# Patient Record
Sex: FEMALE | Race: BLACK OR AFRICAN AMERICAN | NOT HISPANIC OR LATINO | Employment: OTHER | URBAN - METROPOLITAN AREA
[De-identification: names, ages, dates, MRNs, and addresses within clinical notes are randomized per-mention and may not be internally consistent; named-entity substitution may affect disease eponyms.]

---

## 2017-12-05 ENCOUNTER — HISTORICAL (OUTPATIENT)
Dept: RADIOLOGY | Facility: HOSPITAL | Age: 42
End: 2017-12-05

## 2017-12-26 ENCOUNTER — HISTORICAL (OUTPATIENT)
Dept: RADIOLOGY | Facility: HOSPITAL | Age: 42
End: 2017-12-26

## 2018-03-08 ENCOUNTER — HISTORICAL (OUTPATIENT)
Dept: RADIOLOGY | Facility: HOSPITAL | Age: 43
End: 2018-03-08

## 2018-03-28 ENCOUNTER — HISTORICAL (OUTPATIENT)
Dept: RADIOLOGY | Facility: HOSPITAL | Age: 43
End: 2018-03-28

## 2018-03-28 LAB — POC CREATININE: 0.7 MG/DL (ref 0.6–1.3)

## 2018-04-11 ENCOUNTER — HISTORICAL (OUTPATIENT)
Dept: ADMINISTRATIVE | Facility: HOSPITAL | Age: 43
End: 2018-04-11

## 2018-04-11 LAB
ALBUMIN SERPL-MCNC: 3.5 GM/DL (ref 3.4–5)
ALBUMIN/GLOB SERPL: 1 RATIO (ref 1–2)
ALP SERPL-CCNC: 99 UNIT/L (ref 45–117)
ALT SERPL-CCNC: 21 UNIT/L (ref 12–78)
AST SERPL-CCNC: 14 UNIT/L (ref 15–37)
BILIRUB SERPL-MCNC: 0.3 MG/DL (ref 0.2–1)
BILIRUBIN DIRECT+TOT PNL SERPL-MCNC: 0.1 MG/DL
BILIRUBIN DIRECT+TOT PNL SERPL-MCNC: 0.2 MG/DL
BUN SERPL-MCNC: 11 MG/DL (ref 7–18)
CALCIUM SERPL-MCNC: 9 MG/DL (ref 8.5–10.1)
CHLORIDE SERPL-SCNC: 106 MMOL/L (ref 98–107)
CO2 SERPL-SCNC: 27 MMOL/L (ref 21–32)
CREAT SERPL-MCNC: 0.9 MG/DL (ref 0.6–1.3)
ERYTHROCYTE [DISTWIDTH] IN BLOOD BY AUTOMATED COUNT: 12.7 % (ref 11.5–14.5)
GLOBULIN SER-MCNC: 4.2 GM/ML (ref 2.3–3.5)
GLUCOSE SERPL-MCNC: 104 MG/DL (ref 74–106)
HAV IGM SERPL QL IA: NONREACTIVE
HBV CORE IGM SERPL QL IA: NONREACTIVE
HBV SURFACE AG SERPL QL IA: NEGATIVE
HCT VFR BLD AUTO: 39 % (ref 35–46)
HCV AB SERPL QL IA: NONREACTIVE
HGB BLD-MCNC: 12.7 GM/DL (ref 12–16)
HIV 1+2 AB+HIV1 P24 AG SERPL QL IA: NONREACTIVE
MCH RBC QN AUTO: 30.2 PG (ref 26–34)
MCHC RBC AUTO-ENTMCNC: 32.6 GM/DL (ref 31–37)
MCV RBC AUTO: 92.9 FL (ref 80–100)
PLATELET # BLD AUTO: 247 X10(3)/MCL (ref 130–400)
PMV BLD AUTO: 10.6 FL (ref 7.4–10.4)
POTASSIUM SERPL-SCNC: 3.2 MMOL/L (ref 3.5–5.1)
PROT SERPL-MCNC: 7.7 GM/DL (ref 6.4–8.2)
RBC # BLD AUTO: 4.2 X10(6)/MCL (ref 4–5.2)
SODIUM SERPL-SCNC: 143 MMOL/L (ref 136–145)
T PALLIDUM AB SER QL: NONREACTIVE
WBC # SPEC AUTO: 14 X10(3)/MCL (ref 4.5–11)

## 2018-04-17 ENCOUNTER — HISTORICAL (OUTPATIENT)
Dept: SURGERY | Facility: HOSPITAL | Age: 43
End: 2018-04-17

## 2018-04-17 LAB
ABS NEUT (OLG): 7.46 X10(3)/MCL (ref 2.1–9.2)
APPEARANCE, UA: CLEAR
B-HCG SERPL QL: NEGATIVE
BACTERIA #/AREA URNS AUTO: ABNORMAL /[HPF]
BASOPHILS # BLD AUTO: 0.02 X10(3)/MCL
BASOPHILS NFR BLD AUTO: 0 %
BILIRUB UR QL STRIP: NEGATIVE
COLOR UR: ABNORMAL
EOSINOPHIL # BLD AUTO: 0.16 X10(3)/MCL
EOSINOPHIL NFR BLD AUTO: 2 %
ERYTHROCYTE [DISTWIDTH] IN BLOOD BY AUTOMATED COUNT: 12.9 % (ref 11.5–14.5)
GLUCOSE (UA): NORMAL
HCT VFR BLD AUTO: 37.3 % (ref 35–46)
HGB BLD-MCNC: 12 GM/DL (ref 12–16)
HGB UR QL STRIP: NEGATIVE
HYALINE CASTS #/AREA URNS LPF: ABNORMAL /[LPF]
IMM GRANULOCYTES # BLD AUTO: 0.03 10*3/UL
IMM GRANULOCYTES NFR BLD AUTO: 0 %
KETONES UR QL STRIP: NEGATIVE
LEUKOCYTE ESTERASE UR QL STRIP: 25 LEU/UL
LYMPHOCYTES # BLD AUTO: 2.32 X10(3)/MCL
LYMPHOCYTES NFR BLD AUTO: 22 % (ref 13–40)
MCH RBC QN AUTO: 29.4 PG (ref 26–34)
MCHC RBC AUTO-ENTMCNC: 32.2 GM/DL (ref 31–37)
MCV RBC AUTO: 91.4 FL (ref 80–100)
MONOCYTES # BLD AUTO: 0.49 X10(3)/MCL
MONOCYTES NFR BLD AUTO: 5 % (ref 4–12)
NEUTROPHILS # BLD AUTO: 7.46 X10(3)/MCL
NEUTROPHILS NFR BLD AUTO: 71 X10(3)/MCL
NITRITE UR QL STRIP: NEGATIVE
PH UR STRIP: 5.5 [PH] (ref 4.5–8)
PLATELET # BLD AUTO: 235 X10(3)/MCL (ref 130–400)
PMV BLD AUTO: 10.4 FL (ref 7.4–10.4)
POTASSIUM SERPL-SCNC: 3.7 MMOL/L (ref 3.5–5.1)
PROT UR QL STRIP: NEGATIVE
RBC # BLD AUTO: 4.08 X10(6)/MCL (ref 4–5.2)
RBC #/AREA URNS AUTO: ABNORMAL /[HPF]
SP GR UR STRIP: 1.02 (ref 1–1.03)
SQUAMOUS #/AREA URNS LPF: ABNORMAL /[LPF]
UROBILINOGEN UR STRIP-ACNC: NORMAL
WBC # SPEC AUTO: 10.5 X10(3)/MCL (ref 4.5–11)
WBC #/AREA URNS AUTO: ABNORMAL /HPF

## 2018-07-02 ENCOUNTER — HISTORICAL (OUTPATIENT)
Dept: INTERNAL MEDICINE | Facility: CLINIC | Age: 43
End: 2018-07-02

## 2018-07-02 LAB
ALBUMIN SERPL-MCNC: 3.4 GM/DL (ref 3.4–5)
ALBUMIN/GLOB SERPL: 1 RATIO (ref 1–2)
ALP SERPL-CCNC: 104 UNIT/L (ref 45–117)
ALT SERPL-CCNC: 26 UNIT/L (ref 12–78)
AST SERPL-CCNC: 20 UNIT/L (ref 15–37)
BILIRUB SERPL-MCNC: 0.4 MG/DL (ref 0.2–1)
BILIRUBIN DIRECT+TOT PNL SERPL-MCNC: 0.1 MG/DL
BILIRUBIN DIRECT+TOT PNL SERPL-MCNC: 0.3 MG/DL
BUN SERPL-MCNC: 12 MG/DL (ref 7–18)
CALCIUM SERPL-MCNC: 9 MG/DL (ref 8.5–10.1)
CHLORIDE SERPL-SCNC: 109 MMOL/L (ref 98–107)
CHOLEST SERPL-MCNC: 162 MG/DL
CHOLEST/HDLC SERPL: 3.7 {RATIO} (ref 0–4.4)
CO2 SERPL-SCNC: 27 MMOL/L (ref 21–32)
CREAT SERPL-MCNC: 0.9 MG/DL (ref 0.6–1.3)
EST. AVERAGE GLUCOSE BLD GHB EST-MCNC: 97 MG/DL
GLOBULIN SER-MCNC: 3.8 GM/ML (ref 2.3–3.5)
GLUCOSE SERPL-MCNC: 92 MG/DL (ref 74–106)
HBA1C MFR BLD: 5 % (ref 4.2–6.3)
HDLC SERPL-MCNC: 44 MG/DL
LDLC SERPL CALC-MCNC: 103 MG/DL (ref 0–130)
POTASSIUM SERPL-SCNC: 3.9 MMOL/L (ref 3.5–5.1)
PROT SERPL-MCNC: 7.2 GM/DL (ref 6.4–8.2)
SODIUM SERPL-SCNC: 143 MMOL/L (ref 136–145)
TRIGL SERPL-MCNC: 75 MG/DL
TSH SERPL-ACNC: 2.36 MIU/L (ref 0.36–3.74)
VLDLC SERPL CALC-MCNC: 15 MG/DL

## 2018-12-18 ENCOUNTER — HISTORICAL (OUTPATIENT)
Dept: ADMINISTRATIVE | Facility: HOSPITAL | Age: 43
End: 2018-12-18

## 2019-02-04 ENCOUNTER — HISTORICAL (OUTPATIENT)
Dept: ADMINISTRATIVE | Facility: HOSPITAL | Age: 44
End: 2019-02-04

## 2019-02-21 ENCOUNTER — HISTORICAL (OUTPATIENT)
Dept: RADIOLOGY | Facility: HOSPITAL | Age: 44
End: 2019-02-21

## 2019-06-11 ENCOUNTER — HISTORICAL (OUTPATIENT)
Dept: INTERNAL MEDICINE | Facility: CLINIC | Age: 44
End: 2019-06-11

## 2019-06-11 LAB
ABS NEUT (OLG): 6.6 X10(3)/MCL (ref 2.1–9.2)
ALBUMIN SERPL-MCNC: 3.5 GM/DL (ref 3.4–5)
ALBUMIN/GLOB SERPL: 0.9 RATIO (ref 1.1–2)
ALP SERPL-CCNC: 108 UNIT/L (ref 45–117)
ALT SERPL-CCNC: 21 UNIT/L (ref 12–78)
APPEARANCE, UA: CLEAR
AST SERPL-CCNC: 13 UNIT/L (ref 15–37)
BACTERIA #/AREA URNS AUTO: ABNORMAL /[HPF]
BASOPHILS # BLD AUTO: 0.02 X10(3)/MCL
BASOPHILS NFR BLD AUTO: 0 %
BILIRUB SERPL-MCNC: 0.3 MG/DL (ref 0.2–1)
BILIRUB UR QL STRIP: NEGATIVE
BILIRUBIN DIRECT+TOT PNL SERPL-MCNC: <0.1 MG/DL
BILIRUBIN DIRECT+TOT PNL SERPL-MCNC: ABNORMAL MG/DL
BUN SERPL-MCNC: 15 MG/DL (ref 7–18)
CALCIUM SERPL-MCNC: 8.6 MG/DL (ref 8.5–10.1)
CHLORIDE SERPL-SCNC: 109 MMOL/L (ref 98–107)
CHOLEST SERPL-MCNC: 184 MG/DL
CHOLEST/HDLC SERPL: 3.5 {RATIO} (ref 0–4.4)
CO2 SERPL-SCNC: 28 MMOL/L (ref 21–32)
COLOR UR: ABNORMAL
CREAT SERPL-MCNC: 0.9 MG/DL (ref 0.6–1.3)
EOSINOPHIL # BLD AUTO: 0.24 10*3/UL
EOSINOPHIL NFR BLD AUTO: 2 %
ERYTHROCYTE [DISTWIDTH] IN BLOOD BY AUTOMATED COUNT: 12.6 % (ref 11.5–14.5)
EST. AVERAGE GLUCOSE BLD GHB EST-MCNC: 91 MG/DL
GLOBULIN SER-MCNC: 3.9 GM/ML (ref 2.3–3.5)
GLUCOSE (UA): NORMAL
GLUCOSE SERPL-MCNC: 86 MG/DL (ref 74–106)
HAV IGM SERPL QL IA: NONREACTIVE
HBA1C MFR BLD: 4.8 % (ref 4.2–6.3)
HBV CORE IGM SERPL QL IA: NONREACTIVE
HBV SURFACE AG SERPL QL IA: NEGATIVE
HCT VFR BLD AUTO: 39.7 % (ref 35–46)
HCV AB SERPL QL IA: NONREACTIVE
HDLC SERPL-MCNC: 53 MG/DL
HGB BLD-MCNC: 12.9 GM/DL (ref 12–16)
HGB UR QL STRIP: 0.03 MG/DL
HIV 1+2 AB+HIV1 P24 AG SERPL QL IA: NONREACTIVE
HYALINE CASTS #/AREA URNS LPF: ABNORMAL /[LPF]
IMM GRANULOCYTES # BLD AUTO: 0.04 10*3/UL
IMM GRANULOCYTES NFR BLD AUTO: 0 %
KETONES UR QL STRIP: NEGATIVE
LDLC SERPL CALC-MCNC: 114 MG/DL (ref 0–130)
LEUKOCYTE ESTERASE UR QL STRIP: 75 LEU/UL
LYMPHOCYTES # BLD AUTO: 2.42 X10(3)/MCL
LYMPHOCYTES NFR BLD AUTO: 25 % (ref 13–40)
MCH RBC QN AUTO: 29.4 PG (ref 26–34)
MCHC RBC AUTO-ENTMCNC: 32.5 GM/DL (ref 31–37)
MCV RBC AUTO: 90.4 FL (ref 80–100)
MONOCYTES # BLD AUTO: 0.42 X10(3)/MCL
MONOCYTES NFR BLD AUTO: 4 % (ref 4–12)
NEUTROPHILS # BLD AUTO: 6.6 X10(3)/MCL
NEUTROPHILS NFR BLD AUTO: 68 X10(3)/MCL
NITRITE UR QL STRIP: NEGATIVE
PH UR STRIP: 5 [PH] (ref 4.5–8)
PLATELET # BLD AUTO: 247 X10(3)/MCL (ref 130–400)
PMV BLD AUTO: 10.5 FL (ref 7.4–10.4)
POTASSIUM SERPL-SCNC: 3.9 MMOL/L (ref 3.5–5.1)
PROT SERPL-MCNC: 7.4 GM/DL (ref 6.4–8.2)
PROT UR QL STRIP: NEGATIVE
RBC # BLD AUTO: 4.39 X10(6)/MCL (ref 4–5.2)
RBC #/AREA URNS AUTO: ABNORMAL /[HPF]
SODIUM SERPL-SCNC: 141 MMOL/L (ref 136–145)
SP GR UR STRIP: 1.01 (ref 1–1.03)
SQUAMOUS #/AREA URNS LPF: ABNORMAL /[LPF]
TRIGL SERPL-MCNC: 86 MG/DL
TSH SERPL-ACNC: 3.41 MIU/L (ref 0.36–3.74)
UROBILINOGEN UR STRIP-ACNC: NORMAL
VLDLC SERPL CALC-MCNC: 17 MG/DL
WBC # SPEC AUTO: 9.7 X10(3)/MCL (ref 4.5–11)
WBC #/AREA URNS AUTO: ABNORMAL /HPF

## 2019-06-12 ENCOUNTER — HISTORICAL (OUTPATIENT)
Dept: NUTRITION | Facility: HOSPITAL | Age: 44
End: 2019-06-12

## 2019-08-26 ENCOUNTER — HISTORICAL (OUTPATIENT)
Dept: ADMINISTRATIVE | Facility: HOSPITAL | Age: 44
End: 2019-08-26

## 2020-02-24 ENCOUNTER — HISTORICAL (OUTPATIENT)
Dept: RADIOLOGY | Facility: HOSPITAL | Age: 45
End: 2020-02-24

## 2020-06-26 ENCOUNTER — HISTORICAL (OUTPATIENT)
Dept: INTERNAL MEDICINE | Facility: CLINIC | Age: 45
End: 2020-06-26

## 2020-06-26 LAB
ABS NEUT (OLG): 7.56 X10(3)/MCL (ref 2.1–9.2)
ALBUMIN SERPL-MCNC: 3.2 GM/DL (ref 3.4–5)
ALBUMIN/GLOB SERPL: 0.8 RATIO (ref 1.1–2)
ALP SERPL-CCNC: 102 UNIT/L (ref 45–117)
ALT SERPL-CCNC: 25 UNIT/L (ref 12–78)
APPEARANCE, UA: ABNORMAL
AST SERPL-CCNC: 17 UNIT/L (ref 15–37)
BACTERIA #/AREA URNS AUTO: ABNORMAL /HPF
BASOPHILS # BLD AUTO: 0 X10(3)/MCL (ref 0–0.2)
BASOPHILS NFR BLD AUTO: 0 %
BILIRUB SERPL-MCNC: 0.3 MG/DL (ref 0.2–1)
BILIRUB UR QL STRIP: ABNORMAL MG/DL
BILIRUBIN DIRECT+TOT PNL SERPL-MCNC: 0.1 MG/DL (ref 0–0.2)
BILIRUBIN DIRECT+TOT PNL SERPL-MCNC: 0.2 MG/DL
BUN SERPL-MCNC: 12 MG/DL (ref 7–18)
CALCIUM SERPL-MCNC: 8.3 MG/DL (ref 8.5–10.1)
CHLORIDE SERPL-SCNC: 106 MMOL/L (ref 98–107)
CHOLEST SERPL-MCNC: 165 MG/DL
CHOLEST/HDLC SERPL: 2.8 {RATIO} (ref 0–4.4)
CO2 SERPL-SCNC: 29 MMOL/L (ref 21–32)
COLOR UR: YELLOW
CREAT SERPL-MCNC: 0.8 MG/DL (ref 0.6–1.3)
CREAT UR-MCNC: 224 MG/DL
EOSINOPHIL # BLD AUTO: 0.2 X10(3)/MCL (ref 0–0.9)
EOSINOPHIL NFR BLD AUTO: 2 %
ERYTHROCYTE [DISTWIDTH] IN BLOOD BY AUTOMATED COUNT: 13.2 % (ref 11.5–14.5)
EST. AVERAGE GLUCOSE BLD GHB EST-MCNC: 85 MG/DL
GLOBULIN SER-MCNC: 3.9 GM/ML (ref 2.3–3.5)
GLUCOSE (UA): ABNORMAL MG/DL
GLUCOSE SERPL-MCNC: 92 MG/DL (ref 74–106)
HAV IGM SERPL QL IA: NONREACTIVE
HBA1C MFR BLD: 4.6 % (ref 4.2–6.3)
HBV CORE IGM SERPL QL IA: NONREACTIVE
HBV SURFACE AG SERPL QL IA: NONREACTIVE
HCT VFR BLD AUTO: 37.2 % (ref 35–46)
HCV AB SERPL QL IA: NONREACTIVE
HDLC SERPL-MCNC: 58 MG/DL (ref 40–59)
HGB BLD-MCNC: 11.8 GM/DL (ref 12–16)
HGB UR QL STRIP: ABNORMAL MG/DL
HIV 1+2 AB+HIV1 P24 AG SERPL QL IA: NONREACTIVE
HYALINE CASTS #/AREA URNS LPF: ABNORMAL /LPF
IMM GRANULOCYTES # BLD AUTO: 0.04 10*3/UL
IMM GRANULOCYTES NFR BLD AUTO: 0 %
KETONES UR QL STRIP: ABNORMAL MG/DL
LDLC SERPL CALC-MCNC: 92 MG/DL
LEUKOCYTE ESTERASE UR QL STRIP: ABNORMAL LEU/UL
LYMPHOCYTES # BLD AUTO: 2.4 X10(3)/MCL (ref 0.6–4.6)
LYMPHOCYTES NFR BLD AUTO: 22 %
MCH RBC QN AUTO: 29.8 PG (ref 26–34)
MCHC RBC AUTO-ENTMCNC: 31.7 GM/DL (ref 31–37)
MCV RBC AUTO: 93.9 FL (ref 80–100)
MICROALBUMIN UR-MCNC: 7.9 MG/L (ref 0–19)
MICROALBUMIN/CREAT RATIO PNL UR: 3.5 MCG/MG CR (ref 0–29)
MONOCYTES # BLD AUTO: 0.5 X10(3)/MCL (ref 0.1–1.3)
MONOCYTES NFR BLD AUTO: 5 %
NEUTROPHILS # BLD AUTO: 7.56 X10(3)/MCL (ref 2.1–9.2)
NEUTROPHILS NFR BLD AUTO: 70 %
NITRITE UR QL STRIP: ABNORMAL
PH UR STRIP: 5.5 [PH] (ref 4.5–8)
PLATELET # BLD AUTO: 230 X10(3)/MCL (ref 130–400)
PMV BLD AUTO: 10.1 FL (ref 7.4–10.4)
POTASSIUM SERPL-SCNC: 3.8 MMOL/L (ref 3.5–5.1)
PROT SERPL-MCNC: 7.1 GM/DL (ref 6.4–8.2)
PROT UR QL STRIP: 10 MG/DL
RBC # BLD AUTO: 3.96 X10(6)/MCL (ref 4–5.2)
RBC #/AREA URNS AUTO: ABNORMAL /HPF
SODIUM SERPL-SCNC: 138 MMOL/L (ref 136–145)
SP GR UR STRIP: 1.02 (ref 1–1.03)
SQUAMOUS #/AREA URNS LPF: >100 /LPF
T PALLIDUM AB SER QL: NONREACTIVE
TRIGL SERPL-MCNC: 77 MG/DL
TSH SERPL-ACNC: 3.63 MIU/L (ref 0.36–3.74)
UROBILINOGEN UR STRIP-ACNC: NORMAL
VLDLC SERPL CALC-MCNC: 15 MG/DL
WBC # SPEC AUTO: 10.8 X10(3)/MCL (ref 4.5–11)
WBC #/AREA URNS AUTO: ABNORMAL /HPF

## 2020-06-30 ENCOUNTER — HISTORICAL (OUTPATIENT)
Dept: ADMINISTRATIVE | Facility: HOSPITAL | Age: 45
End: 2020-06-30

## 2020-09-20 LAB — POC BETA-HCG (QUAL): NEGATIVE

## 2020-09-21 ENCOUNTER — HISTORICAL (OUTPATIENT)
Dept: ADMINISTRATIVE | Facility: HOSPITAL | Age: 45
End: 2020-09-21

## 2020-09-24 ENCOUNTER — HISTORICAL (OUTPATIENT)
Dept: ADMINISTRATIVE | Facility: HOSPITAL | Age: 45
End: 2020-09-24

## 2020-09-24 LAB
APPEARANCE, UA: CLEAR
BACTERIA #/AREA URNS AUTO: ABNORMAL /HPF
BILIRUB UR QL STRIP: NEGATIVE
COLOR UR: YELLOW
GLUCOSE (UA): NEGATIVE
HGB UR QL STRIP: NEGATIVE
HYALINE CASTS #/AREA URNS LPF: ABNORMAL /LPF
KETONES UR QL STRIP: NEGATIVE
LEUKOCYTE ESTERASE UR QL STRIP: NEGATIVE
NITRITE UR QL STRIP: NEGATIVE
PH UR STRIP: 5.5 [PH] (ref 4.5–8)
PROT UR QL STRIP: NEGATIVE
RBC #/AREA URNS AUTO: ABNORMAL /HPF
SP GR UR STRIP: 1.03 (ref 1–1.03)
SQUAMOUS #/AREA URNS LPF: ABNORMAL /LPF
UROBILINOGEN UR STRIP-ACNC: NORMAL
WBC #/AREA URNS AUTO: ABNORMAL /HPF

## 2020-09-27 LAB — FINAL CULTURE: NO GROWTH

## 2021-01-20 LAB
ABS NEUT (OLG): 11.99 X10(3)/MCL (ref 2.1–9.2)
BUN SERPL-MCNC: 13.6 MG/DL (ref 7–18.7)
CALCIUM SERPL-MCNC: 9.2 MG/DL (ref 8.4–10.2)
CHLORIDE SERPL-SCNC: 104 MMOL/L (ref 98–107)
CO2 SERPL-SCNC: 27 MMOL/L (ref 22–29)
CREAT SERPL-MCNC: 0.95 MG/DL (ref 0.57–1.11)
CREAT/UREA NIT SERPL: 14
ERYTHROCYTE [DISTWIDTH] IN BLOOD BY AUTOMATED COUNT: 13.2 % (ref 11.5–17)
GLUCOSE SERPL-MCNC: 89 MG/DL (ref 74–100)
HCT VFR BLD AUTO: 40.5 % (ref 37–47)
HGB BLD-MCNC: 12.9 GM/DL (ref 12–16)
MCH RBC QN AUTO: 30.2 PG (ref 27–31)
MCHC RBC AUTO-ENTMCNC: 31.9 GM/DL (ref 33–36)
MCV RBC AUTO: 94.8 FL (ref 80–94)
NRBC BLD AUTO-RTO: 0 % (ref 0–0.2)
PLATELET # BLD AUTO: 262 X10(3)/MCL (ref 130–400)
PMV BLD AUTO: 9.7 FL (ref 7.4–10.4)
POTASSIUM SERPL-SCNC: 4.2 MMOL/L (ref 3.5–5.1)
RBC # BLD AUTO: 4.27 X10(6)/MCL (ref 4.2–5.4)
SARS-COV-2 AG RESP QL IA.RAPID: NEGATIVE
SODIUM SERPL-SCNC: 140 MMOL/L (ref 136–145)
WBC # SPEC AUTO: 15.4 X10(3)/MCL (ref 4.5–11.5)

## 2021-01-21 ENCOUNTER — HISTORICAL (OUTPATIENT)
Dept: RADIOLOGY | Facility: HOSPITAL | Age: 46
End: 2021-01-21

## 2021-01-22 ENCOUNTER — HISTORICAL (OUTPATIENT)
Dept: SURGERY | Facility: HOSPITAL | Age: 46
End: 2021-01-22

## 2021-03-08 ENCOUNTER — HISTORICAL (OUTPATIENT)
Dept: ADMINISTRATIVE | Facility: HOSPITAL | Age: 46
End: 2021-03-08

## 2021-03-09 LAB
ABS NEUT (OLG): 10.86 X10(3)/MCL (ref 2.1–9.2)
BUN SERPL-MCNC: 14.5 MG/DL (ref 7–18.7)
CALCIUM SERPL-MCNC: 9.1 MG/DL (ref 8.4–10.2)
CHLORIDE SERPL-SCNC: 104 MMOL/L (ref 98–107)
CO2 SERPL-SCNC: 27 MMOL/L (ref 22–29)
CREAT SERPL-MCNC: 0.84 MG/DL (ref 0.57–1.11)
CREAT/UREA NIT SERPL: 17
ERYTHROCYTE [DISTWIDTH] IN BLOOD BY AUTOMATED COUNT: 12.8 % (ref 11.5–17)
GLUCOSE SERPL-MCNC: 85 MG/DL (ref 74–100)
HCT VFR BLD AUTO: 39.4 % (ref 37–47)
HGB BLD-MCNC: 12.6 GM/DL (ref 12–16)
MCH RBC QN AUTO: 30 PG (ref 27–31)
MCHC RBC AUTO-ENTMCNC: 32 GM/DL (ref 33–36)
MCV RBC AUTO: 93.8 FL (ref 80–94)
NRBC BLD AUTO-RTO: 0 % (ref 0–0.2)
PLATELET # BLD AUTO: 309 X10(3)/MCL (ref 130–400)
PMV BLD AUTO: 9.7 FL (ref 7.4–10.4)
POTASSIUM SERPL-SCNC: 3.9 MMOL/L (ref 3.5–5.1)
RBC # BLD AUTO: 4.2 X10(6)/MCL (ref 4.2–5.4)
SARS-COV-2 AG RESP QL IA.RAPID: NEGATIVE
SODIUM SERPL-SCNC: 140 MMOL/L (ref 136–145)
WBC # SPEC AUTO: 14.4 X10(3)/MCL (ref 4.5–11.5)

## 2021-03-11 ENCOUNTER — HISTORICAL (OUTPATIENT)
Dept: RADIOLOGY | Facility: HOSPITAL | Age: 46
End: 2021-03-11

## 2021-03-12 ENCOUNTER — HISTORICAL (OUTPATIENT)
Dept: SURGERY | Facility: HOSPITAL | Age: 46
End: 2021-03-12

## 2021-03-12 LAB
EST CREAT CLEARANCE SER (OHS): 67.71 ML/MIN
FINAL CULTURE: NORMAL
GRAM STN SPEC: NORMAL
POC BETA-HCG (QUAL): NEGATIVE

## 2021-04-15 LAB — FINAL CULTURE: NORMAL

## 2021-07-26 ENCOUNTER — HISTORICAL (OUTPATIENT)
Dept: INTERNAL MEDICINE | Facility: CLINIC | Age: 46
End: 2021-07-26

## 2021-07-26 LAB
ABS NEUT (OLG): 5.86 X10(3)/MCL (ref 2.1–9.2)
ALBUMIN SERPL-MCNC: 3.5 GM/DL (ref 3.5–5)
ALBUMIN/GLOB SERPL: 1 RATIO (ref 1.1–2)
ALP SERPL-CCNC: 103 UNIT/L (ref 40–150)
ALT SERPL-CCNC: 15 UNIT/L (ref 0–55)
AST SERPL-CCNC: 16 UNIT/L (ref 5–34)
BASOPHILS # BLD AUTO: 0 X10(3)/MCL (ref 0–0.2)
BASOPHILS NFR BLD AUTO: 0 %
BILIRUB SERPL-MCNC: 0.3 MG/DL
BILIRUBIN DIRECT+TOT PNL SERPL-MCNC: 0.1 MG/DL (ref 0–0.8)
BILIRUBIN DIRECT+TOT PNL SERPL-MCNC: 0.2 MG/DL (ref 0–0.5)
BUN SERPL-MCNC: 13.6 MG/DL (ref 7–18.7)
CALCIUM SERPL-MCNC: 9.4 MG/DL (ref 8.4–10.2)
CHLORIDE SERPL-SCNC: 107 MMOL/L (ref 98–107)
CHOLEST SERPL-MCNC: 167 MG/DL
CHOLEST/HDLC SERPL: 4 {RATIO} (ref 0–5)
CO2 SERPL-SCNC: 29 MMOL/L (ref 22–29)
CREAT SERPL-MCNC: 0.84 MG/DL (ref 0.55–1.02)
CREAT UR-MCNC: 226.1 MG/DL (ref 45–106)
DEPRECATED CALCIDIOL+CALCIFEROL SERPL-MC: 25.9 NG/ML (ref 30–80)
EOSINOPHIL # BLD AUTO: 0.3 X10(3)/MCL (ref 0–0.9)
EOSINOPHIL NFR BLD AUTO: 3 %
ERYTHROCYTE [DISTWIDTH] IN BLOOD BY AUTOMATED COUNT: 12.9 % (ref 11.5–14.5)
EST. AVERAGE GLUCOSE BLD GHB EST-MCNC: 93.9 MG/DL
GLOBULIN SER-MCNC: 3.4 GM/DL (ref 2.4–3.5)
GLUCOSE SERPL-MCNC: 86 MG/DL (ref 74–100)
HAV IGM SERPL QL IA: NONREACTIVE
HBA1C MFR BLD: 4.9 %
HBV CORE IGM SERPL QL IA: NONREACTIVE
HBV SURFACE AG SERPL QL IA: NONREACTIVE
HCT VFR BLD AUTO: 39.1 % (ref 35–46)
HCV AB SERPL QL IA: NONREACTIVE
HDLC SERPL-MCNC: 45 MG/DL (ref 35–60)
HGB BLD-MCNC: 12.4 GM/DL (ref 12–16)
HIV 1+2 AB+HIV1 P24 AG SERPL QL IA: NONREACTIVE
IMM GRANULOCYTES # BLD AUTO: 0.03 10*3/UL
IMM GRANULOCYTES NFR BLD AUTO: 0 %
LDLC SERPL CALC-MCNC: 106 MG/DL (ref 50–140)
LYMPHOCYTES # BLD AUTO: 2.2 X10(3)/MCL (ref 0.6–4.6)
LYMPHOCYTES NFR BLD AUTO: 25 %
MCH RBC QN AUTO: 29.6 PG (ref 26–34)
MCHC RBC AUTO-ENTMCNC: 31.7 GM/DL (ref 31–37)
MCV RBC AUTO: 93.3 FL (ref 80–100)
MICROALBUMIN UR-MCNC: 11.7 MG/L
MICROALBUMIN/CREAT RATIO PNL UR: 5.2 MG/GM CR (ref 0–30)
MONOCYTES # BLD AUTO: 0.5 X10(3)/MCL (ref 0.1–1.3)
MONOCYTES NFR BLD AUTO: 6 %
NEUTROPHILS # BLD AUTO: 5.86 X10(3)/MCL (ref 2.1–9.2)
NEUTROPHILS NFR BLD AUTO: 66 %
NRBC BLD AUTO-RTO: 0 % (ref 0–0.2)
PLATELET # BLD AUTO: 262 X10(3)/MCL (ref 130–400)
PMV BLD AUTO: 10.5 FL (ref 7.4–10.4)
POTASSIUM SERPL-SCNC: 4.1 MMOL/L (ref 3.5–5.1)
PROT SERPL-MCNC: 6.9 GM/DL (ref 6.4–8.3)
RBC # BLD AUTO: 4.19 X10(6)/MCL (ref 4–5.2)
SODIUM SERPL-SCNC: 141 MMOL/L (ref 136–145)
TRIGL SERPL-MCNC: 79 MG/DL (ref 37–140)
TSH SERPL-ACNC: 2.09 UIU/ML (ref 0.35–4.94)
VLDLC SERPL CALC-MCNC: 16 MG/DL
WBC # SPEC AUTO: 8.9 X10(3)/MCL (ref 4.5–11)

## 2021-07-27 ENCOUNTER — HISTORICAL (OUTPATIENT)
Dept: ADMINISTRATIVE | Facility: HOSPITAL | Age: 46
End: 2021-07-27

## 2021-08-02 ENCOUNTER — HISTORICAL (OUTPATIENT)
Dept: INFECTIOUS DISEASES | Facility: HOSPITAL | Age: 46
End: 2021-08-02

## 2021-09-30 ENCOUNTER — HISTORICAL (OUTPATIENT)
Dept: ADMINISTRATIVE | Facility: HOSPITAL | Age: 46
End: 2021-09-30

## 2021-11-15 LAB
HUMAN PAPILLOMAVIRUS (HPV): NORMAL
PAP RECOMMENDATION EXT: NORMAL

## 2022-01-24 ENCOUNTER — HISTORICAL (OUTPATIENT)
Dept: ADMINISTRATIVE | Facility: HOSPITAL | Age: 47
End: 2022-01-24

## 2022-01-24 LAB
ABS NEUT (OLG): 8.63 X10(3)/MCL (ref 2.1–9.2)
ALBUMIN SERPL-MCNC: 3.7 GM/DL (ref 3.5–5)
ALBUMIN/GLOB SERPL: 0.9 RATIO (ref 1.1–2)
ALP SERPL-CCNC: 87 UNIT/L (ref 40–150)
ALT SERPL-CCNC: 20 UNIT/L (ref 0–55)
AST SERPL-CCNC: 24 UNIT/L (ref 5–34)
BASOPHILS # BLD AUTO: 0 X10(3)/MCL (ref 0–0.2)
BASOPHILS NFR BLD AUTO: 0 %
BILIRUB SERPL-MCNC: 0.2 MG/DL
BILIRUBIN DIRECT+TOT PNL SERPL-MCNC: <0.1 MG/DL (ref 0–0.5)
BILIRUBIN DIRECT+TOT PNL SERPL-MCNC: >0.1 MG/DL (ref 0–0.8)
BUN SERPL-MCNC: 18.7 MG/DL (ref 7–18.7)
CALCIUM SERPL-MCNC: 9.5 MG/DL (ref 8.7–10.5)
CHLORIDE SERPL-SCNC: 103 MMOL/L (ref 98–107)
CHOLEST SERPL-MCNC: 192 MG/DL
CHOLEST/HDLC SERPL: 3 {RATIO} (ref 0–5)
CO2 SERPL-SCNC: 25 MMOL/L (ref 22–29)
CREAT SERPL-MCNC: 0.74 MG/DL (ref 0.55–1.02)
DEPRECATED CALCIDIOL+CALCIFEROL SERPL-MC: 35.3 NG/ML (ref 30–80)
EOSINOPHIL # BLD AUTO: 0.4 X10(3)/MCL (ref 0–0.9)
EOSINOPHIL NFR BLD AUTO: 3 %
ERYTHROCYTE [DISTWIDTH] IN BLOOD BY AUTOMATED COUNT: 13.1 % (ref 11.5–17)
EST. AVERAGE GLUCOSE BLD GHB EST-MCNC: 96.8 MG/DL
FERRITIN SERPL-MCNC: 33.3 NG/ML (ref 4.63–204)
GLOBULIN SER-MCNC: 4 GM/DL (ref 2.4–3.5)
GLUCOSE SERPL-MCNC: 84 MG/DL (ref 74–100)
HBA1C MFR BLD: 5 %
HCT VFR BLD AUTO: 39.7 % (ref 37–47)
HDLC SERPL-MCNC: 57 MG/DL (ref 35–60)
HGB BLD-MCNC: 12.7 GM/DL (ref 12–16)
IMM GRANULOCYTES # BLD AUTO: 0.01 % (ref 0–0.02)
IMM GRANULOCYTES NFR BLD AUTO: 0.1 % (ref 0–0.43)
IRON SATN MFR SERPL: 23 % (ref 20–50)
IRON SERPL-MCNC: 64 UG/DL (ref 50–170)
LDLC SERPL CALC-MCNC: 117 MG/DL (ref 50–140)
LYMPHOCYTES # BLD AUTO: 2.2 X10(3)/MCL (ref 0.6–4.6)
LYMPHOCYTES NFR BLD AUTO: 19 %
MCH RBC QN AUTO: 29.1 PG (ref 27–31)
MCHC RBC AUTO-ENTMCNC: 32 GM/DL (ref 33–36)
MCV RBC AUTO: 91.1 FL (ref 80–94)
MONOCYTES # BLD AUTO: 0.4 X10(3)/MCL (ref 0.1–1.3)
MONOCYTES NFR BLD AUTO: 3 %
NEUTROPHILS # BLD AUTO: 8.63 X10(3)/MCL (ref 1.4–7.9)
NEUTROPHILS NFR BLD AUTO: 74 %
PLATELET # BLD AUTO: 289 X10(3)/MCL (ref 130–400)
PMV BLD AUTO: 11 FL (ref 9.4–12.4)
POTASSIUM SERPL-SCNC: 4.5 MMOL/L (ref 3.5–5.1)
PREALB SERPL-MCNC: 21 MG/DL (ref 16–38)
PROT SERPL-MCNC: 7.7 GM/DL (ref 6.4–8.3)
RBC # BLD AUTO: 4.36 X10(6)/MCL (ref 4.2–5.4)
SODIUM SERPL-SCNC: 140 MMOL/L (ref 136–145)
T4 FREE SERPL-MCNC: 0.84 NG/DL (ref 0.7–1.48)
TIBC SERPL-MCNC: 216 UG/DL (ref 70–310)
TIBC SERPL-MCNC: 280 UG/DL (ref 250–450)
TRANSFERRIN SERPL-MCNC: 251 MG/DL (ref 180–382)
TRIGL SERPL-MCNC: 89 MG/DL (ref 37–140)
TSH SERPL-ACNC: 2.22 UIU/ML (ref 0.35–4.94)
VIT B12 SERPL-MCNC: 372 PG/ML (ref 213–816)
VLDLC SERPL CALC-MCNC: 18 MG/DL
WBC # SPEC AUTO: 11.6 X10(3)/MCL (ref 4.5–11.5)

## 2022-01-25 ENCOUNTER — HISTORICAL (OUTPATIENT)
Dept: ADMINISTRATIVE | Facility: HOSPITAL | Age: 47
End: 2022-01-25

## 2022-01-25 LAB — FOLATE SERPL-MCNC: 8.3 NG/ML (ref 7–31.4)

## 2022-03-14 ENCOUNTER — HISTORICAL (OUTPATIENT)
Dept: RADIOLOGY | Facility: HOSPITAL | Age: 47
End: 2022-03-14

## 2022-03-14 ENCOUNTER — HISTORICAL (OUTPATIENT)
Dept: ADMINISTRATIVE | Facility: HOSPITAL | Age: 47
End: 2022-03-14

## 2022-04-10 ENCOUNTER — HISTORICAL (OUTPATIENT)
Dept: ADMINISTRATIVE | Facility: HOSPITAL | Age: 47
End: 2022-04-10
Payer: MEDICARE

## 2022-04-28 VITALS
BODY MASS INDEX: 53.8 KG/M2 | DIASTOLIC BLOOD PRESSURE: 80 MMHG | HEIGHT: 62 IN | BODY MASS INDEX: 52.83 KG/M2 | WEIGHT: 292.38 LBS | SYSTOLIC BLOOD PRESSURE: 129 MMHG | HEIGHT: 62 IN | SYSTOLIC BLOOD PRESSURE: 142 MMHG | DIASTOLIC BLOOD PRESSURE: 86 MMHG | OXYGEN SATURATION: 99 % | WEIGHT: 287.06 LBS

## 2022-04-30 NOTE — OP NOTE
DATE OF SURGERY:    03/12/2021    SURGEON:  Lion Bañuelos Jr, MD  ASSISTANT:  None    PREOPERATIVE DIAGNOSIS:  Infected right Achilles tendon, status post repair.    POSTOPERATIVE DIAGNOSIS:  Infected right Achilles tendon, status post repair.    PROCEDURE:  Irrigation and debridement of right Achilles tendon.    ANESTHESIA:  General.    COMPLICATIONS:  None.    IMPLANTS:  None.    SPECIMEN:  Tissue culture.    HISTORY OF PRESENT ILLNESS:  Beverly is a very pleasant 45-year-old who underwent an Achilles tendon repair by my partner in January 2021.  She subsequently developed continued drainage despite p.o. antibiotics.  I recommended formal irrigation and debridement, I discussed with her the risks, benefits, alternative therapies.  She elected to proceed.    DESCRIPTION OF PROCEDURE:  Beverly was initially seen in the preoperative unit where history and physical were reviewed without change.  Her leg was marked.  His consents were reviewed.  All questions were answered.  She was taken to the operating room, placed supine on the operating table where general anesthesia was induced.  Her right lower extremity was prepped and draped in a sterile fashion.  Attending led time-out confirmed the operative site.  Preoperative antibiotics were procedure.  I began the procedure.     I extended the transverse incision vertically, both superiorly and inferiorly.  I sharply incised through skin and spread through subcutaneous tissue.  She had purulence around the Achilles tendon and also medial and lateral.  There was also some purulence in the retrocalcaneal bursa.  This was all debrided with a combination of a knife and a rongeur, pickup and scissors.  Her Achilles tendon was in continuity.  I then took some deep tissue cultures and sent this to the lab for culture.  I irrigated out the wound with greater than 3 L of normal saline and a liter of chlorhexidine-infused saline.  I then closed the skin with a nylon      suture and placed her in a splint with the foot plantar flexed.  She was then awoken unremarkably from anesthesia and transferred to the postop unit in good condition.        ______________________________  MD JO ANN Holcomb Jr/MERLYN  DD:  03/12/2021  Time:  07:57AM  DT:  03/12/2021  Time:  08:11AM  Job #:  [id number]

## 2022-04-30 NOTE — H&P
Patient:   Beverly Lee            MRN: [id number]            FIN: [id number]               Age:   42 years     Sex:  Female     :  1975   Associated Diagnoses:   None   Author:   Georgina Haile MD      H&P Interval update: No changes to H&P. Again discussed risks/benefits of procedure and pt desires to proceed today for laparoscopic bilateral salpingectomy with Dr. Navarro and sphincteroplasty with Dr. Wilburn.    HPI: 43yo  with leaking stool and undesired fertility presents for preoperative assessment for sphincteroplasty and laparoscopic bilateral salpingectomy. Within the past few years, patient reports fecal incontinence worsening fecal incontinence and desires surgical management after minimal improvement in sx with bulking agents and MRI findings of atrophic sphincters. Also with undesired fertility, currently on DepoProvera and wishes to undergo permanent sterilization.    OBHx:  @ 32wga with episiotomy and 4th degree laceration, CD @ 36 and 36wga; BB 5# 6oz  GynHx: 14/R/3, denies h/o STDs or abn pap smears  PMHx: HTN, anxiety/depression, h/o rectovaginal fistula after a 3rd or 4th degree laceration with her first vaginal delivery s/p repair  PSHx: tonsillectomy, C/Sx2, vaginal fistula repair  Meds: Tylenol #3, gabapentin, HCTZ, indomethacin, methocarbamol, phentermine  All: NKDA  Soc: denies tobacco, EtOH, or illicit drug use  FamHx: denies breast, ovarian, colon, or uterine cancer; denies kidney/bladder cancer    Objective:   T: 36.8 °C (Oral) HR: 87(Peripheral) RR: 18 BP: 132/80 HT: 157 cm WT: 130.4 kg BMI: 52.9   General Appearance: Alert, cooperative, no distress, appears older than stated; obese  Lungs: Clear to auscultation bilaterally, respirations unlabored  Heart: Regular rate and rhythm, S1 and S2 normal, no murmur, rub or gallop  Abdomen: Soft,obese, non-tender, bowel sounds active all four quadrants, no masses, no organomegaly  Genitalia:   - Rectal: no  evidence of rectovaginal fistula on exam; thin rectovaginal septum although fully intact; laxity of anal sphincter/slight decrease in tone; no masses or lesions; no blood on glove; no stool impaction  - External genitalia: Normal without lesions  - Vaginal/pelvic support: Normal, moist vaginal mucosa without lesions. No blood  - Cervix: No CMT, no lesions  - Uterus: 8wk size uterue, mobile, nontender, minimal-moderate descent  - Adnexa/parametria: No fullness or masses  Extremities: Extremities normal, atraumatic, no cyanosis or edema    3/2018 MRI Ab/Pelvis:  FINDINGS:  The bladder wall is diffusely thickened measuring 6 mm at its thickest  point. The uterus is normal in size and appearance. A guidewire is  present to the anterior wall at the uterocervical junction compatible  with a  section scar. The ovaries are normal in size and  appearance. No fluid consumers into the deep pelvis a 12 mm x 10 mm  defect is present to the glandular tissue of the left posterior  vaginal vault and is best seen on image 20 of series 4. This may  represent scarring from the patient's fistula repair. No evidence is  present on the current study to suggest that the fistula is open.    The internal and external anal sphincters are not seen clearly enough  on the current study of evaluate for muscle thickness, fat content, or  continuity. It should be stated however that this fact suggests some  degree of atrophy. The levator ani muscles appears thin and atrophic  on image 13 of series 2.    No inguinal or pelvic lymphadenopathy. Chronic is present to the  common hamstring origins. No evidence for pubic symphysitis. No acute  sacroiliitis. Hip joints are preserved. Left-sided gluteal tendinitis.  CT IMPRESSION:  The internal sphincter, external anal sphincter, and levator ani  muscles appear atrophic although the structures are not seen well  enough on the current study to evaluate their individual architectures  or the  potential for tear/disruption. If concern persists, an  endorectal coil may be used for improved resolution of the rectum and  anus.    A 10 mm x 12 mm defect is present to the glandular elements of the  left posterior wall of the vaginal vault consistent with a scar from  the patient's fistula repair. No evidence is present on the current  study to suggest a fistula has reopened.    Diffuse thickening of the bladder wall which measures 6 mm in its  thickest point. Consider cystitis.    1. Stool incontinence   Counseling: Alternatives to this planned procedure were explained to the patient including expectant, medical and other surgical management (including Interstim). This procedure and its risks, reasons, benefits and complications (including injury to bowel, bladder, major blood vessel, ureter, bleeding, possibility of transfusion, infection, scarring, dyspareunia, further surgery, urinary incontinence, worsening fecal incontinence, failure of the procedure or fistula formation) were reviewed in detail. Additional risks specific to this patient and procedure include infection, pain, failure. Patient counseled on risk of blood transfusion including but not limited to allergic reaction, transmission HIV, Hep C 1:2million, transmission Hep B 1:250,000.     2. Undesired Fertility   Counseling: All methods of contraception were discussed with patient in particular, long acting reversible methods including but not limited to hormonal and non-hormonal intrauterine devices, implantable magdaleno device in addition to irreversible/permanent options like female sterilzation vs partner vasectomy, etc. Patient desires to proceed with tubal ligation. Patient counseled on methods of tubal sterilization, including hysteroscopy occlusion, laparoscopic occlusion (rings, clips, fulguration), laparoscopic complete salpingectomy, tubal ligation by laparotomy. Alternatives to this planned procedure were explained to the patient including  expectant, medical and other surgical management. This procedure and its risks, reasons, benefits and complications (including injury to bowel/bladder/major blood vessel/ureter, bleeding, possibility of transfusion, infection, scarring, dyspareunia, failure to prevent pregnancy, possible ectopic pregnancy, further surgery, or fistula formation) were reviewed in detail. Pt understands that this is a form of permanent sterilization.     Plan:  - Surgical consents signed. Preop testing ordered. Surgery case requested. Instructions reviewed, including NPO after midnight. Patient given date and time for EAC appointment  - Follow-up pre-op labs  - Type and screen, UPT on morning of surgery.  - 2g Ancef for infection prophylaxis on call to OR  - SCDs for DVT prophylaxis.  - Surgical plan: laparoscopic bilateral salpingectomy with GYN, followed by sphincteroplasty with UroGyn on 4/17/18  - Postop appt: Will arrange.     Addendum by Clayton Wilburn MD on April 16, 2018 12:01:45 CDT (Verified)  For rectal sphincteroplasty for fecal incontinence

## 2022-04-30 NOTE — PROGRESS NOTES
MNT OUTPATIENT EDUCATION   Nutrition Diagnosis  Problem:   Food & Nutrition Related Knowledge Deficit       Related to:  Medical Diagnosis  As Evidenced by:  Limited prior knowledge / health professional referral    Nutrition Prescription / Intervention  MNT Education Completed on:    Healthy Eating:  Instructed patient on heart healthy eating and weight management; low fat, cholesterol, and sodium foods; better food choices; portion sizes; healthy choices when cooking and / or eating out; reading food labels; increasing activity.       Level of Understanding:   good  Expected Compliance:   good  Appropriate Handouts Given: (age specific / literacy): yes  Barriers to Learning: none    Nutrition Prescription:  Diet order prescribed per MD / RD    Goal:  Patient will adhere to prescribed MNT meal plan as instructed by RD    Monitoring / Evaluation    R.DChintan will monitor: FRANDY

## 2022-04-30 NOTE — PROGRESS NOTES
Nutrition Outpatient No-Show  Patient did not attend scheduled outpatient nutrition appointment --Referral closed.  Thank you for the referral.  Please refer PRN.

## 2022-04-30 NOTE — OP NOTE
Patient:   Beverly Lee            MRN: [id number]            FIN: [id number]               Age:   42 years     Sex:  Female     :  1975   Associated Diagnoses:   None   Author:   Ame Gutierrez MD        [facility] GYN OPERATIVE REPORT     Date of surgery: 2018  Preoperative diagnosis: Undesired Fertility, Stool Incontinence  Postoperative diagnosis: Undesired Fertility, Stool Incontinence  Procedure: Laparoscopic Bilateral Tubal Salpingectomy; Anal Sphincteroplasty (Please see separate UroGynecology operative report for procedure detail).   Surgeon: Dr. Ame Gutierrez (PGY-2)  Assistant: Dr. Georgina Haile (PGY-3), Dr. Susanne Pedraza (PGY-4)  Attending: Dr. Kaylee Navarro  Anesthesia: General  EBL:10 cc  IVF:1200cc  UOP: 50 cc  Specimen: bilateral fallopian tubes  Complications: none  Findings:   -EUA: ~8cm uterus, mobile, not broad, moderate descent; no adnexal fullness/tenderness  -Intraop: normal appearing uterus, ovaries, fallopian tubes; normal liver, stomach, falciform ligament. Area of filmy omental adhesions along the left lateral abdomen 2 cm below the level of the umbilicus, no incorporation of bowel    Indication and Consent:  Patient is a 43 yo with undesired fertility. The risks, benefits, and alternatives of laparoscopic surgery and local excision were reviewed with the patient. She understood that the risks include but are not limited to pain, injury to surrounding organs, blood loss, need for transfusion, infection, failure to prevent pregnancy, and/or need for reoperation. The patient agreed to the procedure and signed the consent forms at her pre-operative visit.     Procedure: The patient was taken to the OR with IV fluids running. SCDs were applied to the lower extremities. General anesthesia was administered without difficulty. She was positioned in the dorsal lithotomy position with Levon-type stirrups. EUA revealed above findings. The patient was prepared and draped in the  normal sterile fashion. A canseco catheter was inserted to empty the bladder. A speculum was used to visualize the cervix. The anterior lip of the cervix was grasped with a single tooth tenaculum. A Humi uterine manipulator was introduced.     Attention was then paid to the laparoscopic portion of the procedure. 1% lidocaine was injected in the umbilical fold.  A skin incision was made with the scalpel.  The abdomen was elevated and the 5 mm trocar was introduced under direct visualization of the laparoscope.  All of the abdominal wall layers were identified.  Placement into the abdominal cavity was confirmed with visualization of the omentum and anterior peritoneum.  The abdomen was then insufflated with CO2 to a pressure of 15 mmHg.  A survey of the abdomen was performed with the aforementioned findings as listed above.  The bowel and omentum immediately below the trocar entry site were inspected and no damage noted.  Two additional 5 mm trocars were inserted in a similar manner in the bilateal lower quadrants. The patient was then placed in Trendelenberg to facilitate visualization of the pelvis.  The pelvic anatomy was noted as above.    The left fallopian tube was elevated away from the pelvic sidewall with atraumatic graspers. Using the Ligasure, the mesosalpinx was sequentially clamped, dessicated, and cut. The tube was then transected near the cornua after complete desication. The tube was removed through the trocar and sent to pathology.  The right fallopian tube was elevated away from the pelvic sidewall with atraumatic graspers. Using the Ligasure, the mesosalpinx was sequentially clamped, dessicated, and cut. The tube was then transected near the cornua after complete desication. The tube was removed through the trocar and sent to pathology. Good hemostasis of the transected ends was noted.     Two of the abdominal trocars were removed from the abdomen under direct visualization of the laparoscope.  The  abdomen was then desufflated.  5 manual breaths were given by anesthesia while 1 trocar remained in place.  The last trocar was then removed.  The trocar incisions were closed with vicryl and skin adhesive.  Hemostasis was noted.      Please see separate urogynecology operative report for Anal Sphincteroplasty details.     The patient tolerated the procedure well.  All instruments were removed from the abdomen and the vagina, and all counts were correct times two.  The patient was taken to the recovery room in a stable condition.

## 2022-04-30 NOTE — OP NOTE
Patient:   Beverly Lee            MRN: [id number]            FIN: [id number]               Age:   42 years     Sex:  Female     :  1975   Associated Diagnoses:   None   Author:   Milo NORIEGA, Susanne Thomas      Urogyn Operative Report    Date of surgery:  18  Preoperative diagnosis: Fecal incontinence   Postoperative diagnosis: Same  Procedure: Sphincteroplasty  Attending: Clayton Wilburn MD  Surgeon: Susanne Pedraza MD  Assistants: Dorothea Lynn DO  Anesthesia: General endotracheal  Complications: None   EBL: 10cc  Specimen: none    Findings:   Rectal: no evidence of rectovaginal fistula on exam; thin rectovaginal septum although fully intact; laxity of anal sphincter/slight decrease in tone; no masses or lesions; no blood on glove    Indication & Consent:  43yo  with fecal incontinence with plan for sphincteroplsaty. The risks, benefits, and alternatives were reviewed with the patient. She understood that the risks include but are not limited to pain, injury to surrounding organs, blood loss, need for transfusion, infection, and/or need for reoperation. The patient agreed to the procedure and signed the consent forms at her pre-operative visit. On the morning of surgery that patient states there are no new additions or alternations to history and physical examination. Patient desired to proceed with surgery as scheduled.     Procedure:  The patient was taken to the operating room and general endotrachreal anesthesia was achieved without difficulty. Antibiotic prophylaxis with ancef 3g. A sign out was then performed. The patient was prepped and draped in the normal sterile fashion and a canseco catheter was placed. Two allis clamps were used to elevate the perineal body. A transverse incision across the body of the perineum with the scalpel. Dissection was performed with the violetta until visualization of the sphincter capsule was appreciated. The bilateral aspects of the sphincter was  grasped with two allis clamps and confirmed by palpation of the sphincter. Three vicryl sutures were used to reapproximate the bilateral sphincter capsules in the inferior, superior, and anterior aspect. Interrupted vicryl sutures were then used to reapproximate the perineum in a vertical fashion. A vicryl suture was then used to close the incision vertically in a running fashion. The patient tolerated the procedure well. All instruments were removed and all counts were correct times two. The patient was taken to the recovery room in a stable condition. Dr. Wilburn was present and scrubbed for the entire procedure.

## 2022-05-03 NOTE — HISTORICAL [facility] CERNER
This is a historical note converted from Mendy. Formatting and pictures may have been removed.  Please reference Mendy for original formatting and attached multimedia. OPERATIVE REPORT  ?  DATE: 1/22/2021  ?  ASSISTANT: moses Jara assistant  ?  PREOPERATIVE DIAGNOSIS:  1.? Right Achilles tendon rupture, mid substance  ?  POSTOPERATIVE DIAGNOSIS:  Same  ?  PROCEDURES:  1.??Right Achilles tendon repair  ?  ANESTHESIA:  General?  ?  BLOOD LOSS:  None  ?  DVT PROPHYLAXIS:  Aspirin twice daily for 2 weeks  ?  INSTRUMENTATION:  Arthrex PARS Achilles repair system  ?  PROCEDURE IN DETAIL:  ?  Achilles tendon repair (PARS)  ?  The patient was brought to room and placed on the table in a prone position. ?The operative lower extremity was prepped and draped in the normal sterile fashion. ?A timeout procedure was done to confirm the operative extremity, the procedure, allergies, and if antibodies were given.  ?  Next we started with a small medial longitudinal incision along the Achilles defect. ?I then did dissection through the peritenon and then could visualize the Achilles tendon rupture. ?I removed all the scar and clot material. ?I then cleaned of both ends of the tendon. ?Next I passed the PARS suture shuttling device to the proximal portion of the tendon. ?I then passed the sutures through the tendons from medial to lateral in the appropriate fashion. ?I then locked the blue suture on both sides. ?I then performed the same maneuver on the distal tendon stump. ?I then tied the same color sutures on both the medial lateral sides of the Achilles tendon. ?There was good appropriation of the tendon and a fairly rigid plantar flexed position of the foot.  ?  I then irrigated the wound very thoroughly. ?I closed the subcutaneous tissue with 2-0 Vicryls. ?I then closed the skin with a 4-0 running nylon stitch. ?I then placed a sterile dressing and placed the patient in a cast with the foot in plantar  flexion. ?Patient tolerated procedure well without any complications.   The physician assistant/nurse practitioner was imperative to the critical parts of the surgical case. ?This included the approach and dissection, retraction, possible placement of hardware and implants, and closure.

## 2022-05-03 NOTE — HISTORICAL [facility] CERNER
This is a historical note converted from [employer]. Formatting and pictures may have been removed.  Please reference [employer] for original formatting and attached multimedia. Chief Complaint  elevated blood pressure readings; itching right inner foot  History of Present Illness  June 26, 2020 Telemedicine Visit: Ms. Paul is a very pleasant?43 yo AAF?due for follow up and lab results. PMHx includes HTN, anal sphincter incontinence, anxiety and depression, and obesity. c/o sinus?pressure, PND,?and feels that?it is causing nausea.?Clear rhinorrhea. Denies any ear pain, sore throat, fever, chills, diarrhea, cough,?SOB, CP.?Started Flonase regularly?and Zyrtec in the last month d/t worsening symptoms with some improvement.?Also?c/o difficulty focusing. Finds herself being forgetful. Forgets things multiple times during the day such as when driving, exiting, missing appointments as scheduled. 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 Not keeping written schedule or reminders. Both of her children are diagnosed with ADD and being treated. She finds she has similar symptoms as them. She also tells me that she has stopped Paroxetine in the last few months. She states she was forgetting to take and just stopped it. BP readings with SBP around 120/130. Stopped lisinopril due to normal readings and feeling well. Reviewed labs with patient. Denies any poor appetite, abdominal pain, n/v.  ?   September 24, 2020  Ms. Paul is a very pleasant?46 yo AAF?due for follow?up. PMHx includes HTN, anal sphincter incontinence, anxiety and depression, and obesity. c/o elevated BP readings at home. BP today 142/86. Also c/o right ankle abrasion s/t scratching dry area of skin. She states it itches when she takes hot baths. Thinks it was an eczema patch of dry skin. Denies any drainage. Was [place] [facility] was seen for on [place]  20. Has visit with Ortho clinic next week for R foot pain.?Also expressed concern regarding UTI. Does have stool leakage. Has had intermittent pains to right side and prior UTIs presented similarly. Paroxetine working well. AR improved. Denies any fever, chills, cough, SOB, CP.  ?   Other providers:  [facility] G[facility] clinic  [facility] ortho clinic  Review of Systems  Constitutional: negative except as stated in HPI  Eye: negative except as stated in HPI  ENMT: negative except as stated in HPI  Respiratory: negative except as stated in HPI  Cardiovascular: negative except as stated in HPI  Gastrointestinal: negative except as stated in HPI  Genitourinary: negative except as stated in HPI  Hema/Lymph: negative except as stated in HPI  Endocrine: negative except as stated in HPI  Immunologic: negative except as stated in HPI  Musculoskeletal: negative except as stated in HPI  Integumentary: negative except as stated in HPI  Neurologic: negative except as stated in HPI  ?   All Other ROS_ ?negative except as stated in HPI  Physical Exam  Vitals & Measurements  T:?37.1? ?C (Oral)? HR:?73(Peripheral)? RR:?20? BP:?142/86?  HT:?157.48?cm? WT:?132.630?kg? BMI:?53.48?  General: Alert and oriented. Obese. Appropriately?dressed. No acute distress.  HENT: Normocephalic. Wearing facial mask. Wearing glasses.  Neck: Supple. No LAD. No thyromegaly, firmness or nodularity.  Respiratory: Lungs are clear to auscultation, Respirations are non-labored, Breath sounds are equal, Symmetrical chest wall expansion.  Cardiovascular: Normal rate, Regular rhythm, No murmur.  Gastrointestinal: Soft, Rounded, non tender, negative flank pain.  Integumentary: Warm, Dry, Intact. R medial aspect of ankle  Neurologic: No focal deficits.  Psychiatric: Calm. Appropriate mood and affect. Judgment intact with clear thought processes.  Assessment/Plan  1.?Abrasion, foot?D10.235D  keep area clean and dry, wash with mild soap (Dial) and warm water twice daily  apply  Bactroban to area BID x 7-14 days (can obtain OTC)  if symptoms do not improve, notify provider  ?  2.?Hypertension?I10  /86  Educated on low sodium diet  Educated to avoid alcohol or tobacco use if applicable  Educated on health benefits of?at least 5 days/ week?of 30 minutes moderate intensity exercise (brisk walking) and 2 or more days/ week of muscle strength activities  Resume lisinopril at lower dose of 5 mg once daily and monitor BP at home  Ordered:  lisinopril, 5 mg = 1 tab(s), Oral, Daily, # 90 tab(s), 1 Refill(s), Pharmacy: [facility] #[id number], 157.48, cm, Height/Length Dosing, 09/24/20 13:39:00 CDT, 132.63, kg, Weight Dosing, 09/24/20 13:39:00 CDT  Basic Metabolic Panel, Routine collect, *Est. 01/24/21 3:00:00 CST, Blood, Order for future visit, *Est. Stop date 01/24/21 3:00:00 CST, Lab Collect, Hypertension, 09/24/20 14:00:00 CDT  Clinic Follow up, *Est. 01/24/21 3:00:00 CST, Order for future visit, Hypertension, [facility]  ?  3.?Dysuria?R30.0  has leakage of stool and has been experiencing some side pain (usually presents with similar symptoms with UTI)  UA and urine culture ordered  Ordered:  Urinalysis with Microscopic if Indicated, Routine collect, Urine, Order for future visit, *Est. 09/24/20 3:00:00 CDT, *Est. Stop date 09/24/20 3:00:00 CDT, Nurse collect, Dysuria  Urine Culture [id number], Routine collect, *Est. 09/24/20 3:00:00 CDT, Order for future visit, Urine, Nurse collect, *Est. Stop date 09/24/20 3:00:00 CDT, Dysuria  ?  4.?Allergic rhinitis?J30.9  Controlled, continue with Zyrtec, Flonase and add Astelin  ?  5.?Anxiety and depression?F41.9  Self discontinued Paroxetine x few months because she was forgetting to take it  Instructed to resume as prescribed and monitor for any change/ worsening in symptoms and if occurs, notify provider  RTC?2-3 mo?  ?  September 24, 2020  controlled, continue with paroxetine  Ordered:  PARoxetine, 10 mg = 1 tab(s), Oral, Daily, # 30 tab(s), 6  Refill(s), Pharmacy: [facility] #[id number], 157.48, cm, Height/Length Dosing, 09/24/20 13:39:00 CDT, 132.63, kg, Weight Dosing, 09/24/20 13:39:00 CDT  ?  6.?Well adult exam?Z00.00  Health Maintenance:  MMG- 2/24/2020 BIRADS 1  GYN- 2/5/18 NIL, HPV 16, 18, 45 negative; had exam at [facility]-- need records, will sign PASTOR  DEXA-  CRC- Colonoscopy in 2019-- need records, will sign PASTOR  ?   Positive fhx of colon polyps and questionable CRC?(father, age 40).  ?   Vaccines:  Influenza-  Pneumonia-  Tdap- 3/2/13  ?  Orders:  azelastine nasal, 1 spray(s), Nasal, Daily, in each nostril, # 30 mL, 6 Refill(s), Pharmacy: [facility] #[id number], 157.48, cm, Height/Length Dosing, 09/24/20 13:39:00 CDT, 132.63, kg, Weight Dosing, 09/24/20 13:39:00 CDT  cetirizine, 10 mg = 1 tab(s), Oral, Daily, # 30 tab(s), 6 Refill(s), Pharmacy: [facility] #[id number], 157.48, cm, Height/Length Dosing, 09/24/20 13:39:00 CDT, 132.63, kg, Weight Dosing, 09/24/20 13:39:00 CDT  fluticasone nasal, 1 spray(s), Nasal, Daily, in each nostril, # 16 gm, 3 Refill(s), Pharmacy: [facility] #[id number], 157.48, cm, Height/Length Dosing, 09/24/20 13:39:00 CDT, 132.63, kg, Weight Dosing, 09/24/20 13:39:00 CDT  RTC in 4 mo with BMP.  If at any time condition worsens or experience new symptoms/ concerns, please call clinic for sooner appointment or go to ED/UCC.  Referrals  Clinic Follow up, *Est. 01/25/21 9:00:00 CST, Order for future visit, Hypertension, [facility]   Problem List/Past Medical History  Ongoing  Allergic rhinitis  Anxiety and depression  Hypertension  Nerve damage  Obesity  Historical  Anxiety  Depression  Hypertension  Knowledge deficit  Procedure/Surgical History  rectal pacemaker (12/05/2018)  Lap Salpingectomy (None) (04/17/2018)  Laparoscopy, surgical; with removal of adnexal structures (partial or total oophorectomy and/or salpingectomy) (04/17/2018)  Repair Anal Sphincter, Open Approach (04/17/2018)  Resection of  Bilateral Fallopian Tubes, Percutaneous Endoscopic Approach (2018)  Sphincteroplasty, anal, for incontinence or prolapse; adult (2018)  tonsillectomy,   vaginal fistula repair   Medications  Astelin 137 mcg/inh nasal spray, 1 spray(s), Nasal, Daily, 6 refills  cetirizine 10 mg oral tablet, 10 mg= 1 tab(s), Oral, Daily, 6 refills  Flonase 50 mcg/inh nasal spray, 1 spray(s), Nasal, Daily, 3 refills  lisinopril 5 mg oral tablet, 5 mg= 1 tab(s), Oral, Daily, 1 refills  meloxicam 7.5 mg oral tablet, 7.5 mg= 1 tab(s), Oral, Daily, PRN  mupirocin 2% topical cream, 1 shelbi, TOP, TID,? ?Investigating: Last Dose Date/Time Unknown  paroxetine 10 mg oral tablet, 10 mg= 1 tab(s), Oral, Daily, 6 refills  Allergies  Macrobid?(Palpitations)  Social History  Abuse/Neglect  No, 2020  Alcohol - Denies Alcohol Use, 12/10/2015  Never, 2019  Employment/School  Unemployed, 2019  Exercise  Home/Environment  Lives with Children. Living situation: Home/Independent., 2018  Nutrition/Health  Regular, 2018  Sexual  Gender Identity Identifies as female., 2019  Substance Use  Never, 2019  Tobacco - Denies Tobacco Use, 12/10/2015  Never (less than 100 in lifetime), N/A, 2020  Family History  COPD (chronic obstructive pulmonary disease).: Mother.  Hypertension.: Mother and Father.  Immunizations  Vaccine Date Status   influenza virus vaccine, inactivated 2013 Recorded   tetanus/diphtheria/pertussis, acel(Tdap) 2013 Recorded   influenza virus vaccine, inactivated 2008 Recorded   tetanus/diphtheria/pertussis, acel(Tdap) 2008 Recorded   poliovirus vaccine, live, trivalent 1982 Recorded   measles/mumps/rubella virus vaccine 1980 Recorded   poliovirus vaccine, live, trivalent 1980 Recorded   poliovirus vaccine, live, trivalent 1978 Recorded   poliovirus vaccine, live, trivalent 1978 Recorded   poliovirus vaccine, live, trivalent  1977 Recorded   Health Maintenance  Health Maintenance  ???Pending?(in the next year)  ??? ??Due?  ??? ? ? ?Hypertension Management-Education due??09/24/20??and every 1??year(s)  ??? ? ? ?Influenza Vaccine due??09/24/20??and every?  ??? ??Due In Future?  ??? ? ? ?Obesity Screening not due until??01/01/21??and every 1??year(s)  ??? ? ? ?Alcohol Misuse Screening not due until??01/02/21??and every 1??year(s)  ??? ? ? ?Cervical Cancer Screening not due until??02/04/21??and every 3??year(s)  ??? ? ? ?Hypertension Management-BMP not due until??06/26/21??and every 1??year(s)  ???Satisfied?(in the past 1 year)  ??? ??Satisfied?  ??? ? ? ?ADL Screening on??09/24/20.??Satisfied by Diamante IZQUIERDON, Sandra F  ??? ? ? ?Alcohol Misuse Screening on??09/24/20.??Satisfied by Galladdy LPN, Sandra F  ??? ? ? ?Blood Pressure Screening on??09/24/20.??Satisfied by Galladdy LPN, Sandra F  ??? ? ? ?Body Mass Index Check on??09/24/20.??Satisfied by Galladdy LPN, Sandra F  ??? ? ? ?Breast Cancer Screening on??02/24/20.??Satisfied by Deepti Sanders  ??? ? ? ?Depression Screening on??09/24/20.??Satisfied by Galladdy LPN, Sandra F  ??? ? ? ?Diabetes Screening on??06/26/20.??Satisfied by Donovan Ramos Jr.  ??? ? ? ?Hypertension Management-Blood Pressure on??09/24/20.??Satisfied by Gallow LPN, Sandra F  ??? ? ? ?Influenza Vaccine on??09/24/20.??Satisfied by Galladdy LPN, Sandra F  ??? ? ? ?Lipid Screening on??06/26/20.??Satisfied by Rachel Samaniego, Donovan Ott  ??? ? ? ?Obesity Screening on??09/24/20.??Satisfied by Sandra Bobby LPN  ?  Lab Results  Test Name Test Result Date/Time   Sodium Lvl 138 mmol/L 06/26/2020 06:52 CDT   Potassium Lvl 3.8 mmol/L 06/26/2020 06:52 CDT   Chloride 106 mmol/L 06/26/2020 06:52 CDT   CO2 29 mmol/L 06/26/2020 06:52 CDT   Calcium Lvl 8.3 mg/dL (Low) 06/26/2020 06:52 CDT   Glucose Lvl 92 mg/dL 06/26/2020 06:52 CDT   EAG 85 mg/dL 06/26/2020 06:52 CDT   BUN 12 mg/dL 06/26/2020 06:52 CDT   Creatinine 0.80 mg/dL 06/26/2020  06:52 CDT   eGFR- mL/min 06/26/2020 06:52 CDT   eGFR-JENA 84 mL/min (Low) 06/26/2020 06:52 CDT   Bili Total 0.3 mg/dL 06/26/2020 06:52 CDT   Bili Direct 0.1 mg/dL 06/26/2020 06:52 CDT   Bili Indirect 0.2 mg/dL 06/26/2020 06:52 CDT   AST 17 unit/L 06/26/2020 06:52 CDT   ALT 25 unit/L 06/26/2020 06:52 CDT   Alk Phos 102 unit/L 06/26/2020 06:52 CDT   Total Protein 7.1 gm/dL 06/26/2020 06:52 CDT   Albumin Lvl 3.2 gm/dL (Low) 06/26/2020 06:52 CDT   Globulin 3.90 gm/mL (High) 06/26/2020 06:52 CDT   A/G Ratio 0.8 ratio (Low) 06/26/2020 06:52 CDT

## 2022-05-12 ENCOUNTER — TELEPHONE (OUTPATIENT)
Dept: ORTHOPEDICS | Facility: CLINIC | Age: 47
End: 2022-05-12
Payer: MEDICARE

## 2022-05-12 ENCOUNTER — HOSPITAL ENCOUNTER (OUTPATIENT)
Dept: RADIOLOGY | Facility: CLINIC | Age: 47
Discharge: HOME | End: 2022-05-12
Attending: ORTHOPAEDIC SURGERY
Payer: MEDICARE

## 2022-05-12 ENCOUNTER — OFFICE VISIT (OUTPATIENT)
Dept: ORTHOPEDICS | Facility: CLINIC | Age: 47
End: 2022-05-12
Payer: MEDICARE

## 2022-05-12 VITALS
WEIGHT: 267 LBS | HEART RATE: 73 BPM | BODY MASS INDEX: 49.13 KG/M2 | SYSTOLIC BLOOD PRESSURE: 125 MMHG | DIASTOLIC BLOOD PRESSURE: 87 MMHG | HEIGHT: 62 IN

## 2022-05-12 DIAGNOSIS — M67.431 GANGLION CYST OF VOLAR ASPECT OF RIGHT WRIST: Primary | ICD-10-CM

## 2022-05-12 DIAGNOSIS — M25.531 RIGHT WRIST PAIN: ICD-10-CM

## 2022-05-12 PROCEDURE — 99213 OFFICE O/P EST LOW 20 MIN: CPT | Mod: ,,, | Performed by: ORTHOPAEDIC SURGERY

## 2022-05-12 PROCEDURE — 99213 PR OFFICE/OUTPT VISIT, EST, LEVL III, 20-29 MIN: ICD-10-PCS | Mod: ,,, | Performed by: ORTHOPAEDIC SURGERY

## 2022-05-12 PROCEDURE — 73110 X-RAY EXAM OF WRIST: CPT | Mod: RT,,, | Performed by: ORTHOPAEDIC SURGERY

## 2022-05-12 PROCEDURE — 73110 XR WRIST COMPLETE 3 VIEWS RIGHT: ICD-10-PCS | Mod: RT,,, | Performed by: ORTHOPAEDIC SURGERY

## 2022-05-12 RX ORDER — PAROXETINE HYDROCHLORIDE 30 MG/1
30 TABLET, FILM COATED ORAL EVERY MORNING
COMMUNITY
Start: 2022-03-22

## 2022-05-12 RX ORDER — MELOXICAM 15 MG/1
TABLET ORAL
COMMUNITY
Start: 2022-03-16 | End: 2023-02-28

## 2022-05-12 RX ORDER — TRAZODONE HYDROCHLORIDE 50 MG/1
50-100 TABLET ORAL NIGHTLY
COMMUNITY
Start: 2022-03-22

## 2022-05-12 RX ORDER — FLUTICASONE PROPIONATE 50 MCG
SPRAY, SUSPENSION (ML) NASAL
COMMUNITY
Start: 2021-11-15

## 2022-05-12 RX ORDER — DICLOFENAC SODIUM 10 MG/G
GEL TOPICAL
COMMUNITY
Start: 2022-03-16

## 2022-05-12 RX ORDER — DEXTROAMPHETAMINE SACCHARATE, AMPHETAMINE ASPARTATE MONOHYDRATE, DEXTROAMPHETAMINE SULFATE AND AMPHETAMINE SULFATE 7.5; 7.5; 7.5; 7.5 MG/1; MG/1; MG/1; MG/1
CAPSULE, EXTENDED RELEASE ORAL
COMMUNITY
Start: 2022-05-09

## 2022-05-12 NOTE — TELEPHONE ENCOUNTER
Spoke with patient. She wanted a referral to a different hand specialist because Dr. East did not take her insurance. After speaking with Dr. Collins he suggested we send new referral to [facility] in [place]. I informed the patient. She had no other questions or concerns. Referral faxed over.

## 2022-05-12 NOTE — PROGRESS NOTES
History of present illness:    This is a 46 y.o. year old female presenting with complaints of right wrist pain for approximately 1-2 weeks.  She reports that she noticed a small nodule on the volar aspect of her right wrist that was associated with severe pain.  She reports that the pain is actually keeping her up at night.  No issues with range of motion.      Past Medical History:   Diagnosis Date    Arthritis     Carpal tunnel syndrome     Hypertension     Respiratory distress        Past Surgical History:   Procedure Laterality Date     SECTION      HAND SURGERY      NERVE REPAIR      RECTAL SURGERY      REPAIR OF LIGAMENT OF ANKLE      TONSILLECTOMY      VAGINA SURGERY      WISDOM TOOTH EXTRACTION         Current Outpatient Medications   Medication Sig    dextroamphetamine-amphetamine (ADDERALL XR) 30 MG 24 hr capsule     diclofenac sodium (VOLTAREN) 1 % Gel APPLY TOPICALLY TO THE AFFECTED AREA FOUR TIMES DAILY AS NEEDED FOR PAIN    fluticasone propionate (FLONASE) 50 mcg/actuation nasal spray prn    meloxicam (MOBIC) 15 MG tablet TAKE 1 TABLET BY MOUTH DAILY WITH FOOD. AVOID OTHER NSAIDS    PAXIL 30 mg tablet Take 30 mg by mouth every morning.    traZODone (DESYREL) 50 MG tablet Take  mg by mouth nightly.     No current facility-administered medications for this visit.       Review of patient's allergies indicates:   Allergen Reactions    Nitrofurantoin      Other reaction(s): Palpitations    Nitrofurantoin monohyd/m-cryst Palpitations       History reviewed. No pertinent family history.    Social History     Socioeconomic History    Marital status: Unknown   Tobacco Use    Smoking status: Never Smoker    Smokeless tobacco: Never Used   Substance and Sexual Activity    Alcohol use: Yes       Chief Complaint:   Chief Complaint   Patient presents with    Right Wrist - Pain    Pain     Right wrist pain states it feels like a ganglion cyst       Consulting Physician: No  "ref. provider found      Review of Systems:  All review of systems negative except for those stated in the HPI.    Examination:    Vital Signs:    Vitals:    05/12/22 0956   BP: 125/87   Pulse: 73   Weight: 121.1 kg (267 lb)   Height: 5' 2" (1.575 m)   PainSc:   6       Body mass index is 48.83 kg/m².    Physical Exam:   General: Well-developed, well-nourished.  Neuro: Alert and oriented x 3.  Psych: Normal mood and affect.  Right Wrist Exam:  Small, round palpable nodule to the volar aspect of the wrist. Negative tenderness over distal radius. Supination and pronation to 90 degrees and 90 degrees, respectively. Wrist flexion to 90 degrees and wrist extension to 70 degree. Negative flexion-compression test and Phanel´s test. Negative Finkelstein´s test. 5/5 strength, normal skin appearance and palpable pulses and CR<2.      Imaging: X-rays ordered and images interpreted today personally by me of right wrist demonstrate no acute osseous pathology.      Assessment: Ganglion cyst of volar aspect of right wrist  -     Ambulatory referral/consult to Orthopedics; Future; Expected date: 05/19/2022    Right wrist pain  -     X-Ray Wrist Complete Right; Future; Expected date: 05/12/2022        Plan:  X-rays were reviewed with the patient.  Referral in order to Marito Nix MD for further evaluation and recommendations.  She will return to clinic as needed for any additional issues or concerns.  She verbalized understanding the plan of care with no further questions.         Follow up if symptoms worsen or fail to improve.      DISCLAIMER: This note may have been dictated using voice recognition software and may contain grammatical errors.     NOTE: Consult report sent to referring provider via [employer] EMR.  "

## 2022-07-26 ENCOUNTER — OFFICE VISIT (OUTPATIENT)
Dept: INTERNAL MEDICINE | Facility: CLINIC | Age: 47
End: 2022-07-26
Payer: MEDICARE

## 2022-07-26 VITALS
WEIGHT: 233.38 LBS | BODY MASS INDEX: 42.95 KG/M2 | RESPIRATION RATE: 20 BRPM | SYSTOLIC BLOOD PRESSURE: 117 MMHG | HEART RATE: 71 BPM | DIASTOLIC BLOOD PRESSURE: 83 MMHG | TEMPERATURE: 99 F | HEIGHT: 62 IN

## 2022-07-26 DIAGNOSIS — Z00.00 WELLNESS EXAMINATION: Primary | ICD-10-CM

## 2022-07-26 DIAGNOSIS — F41.8 MIXED ANXIETY DEPRESSIVE DISORDER: ICD-10-CM

## 2022-07-26 DIAGNOSIS — B35.3 TINEA PEDIS OF BOTH FEET: ICD-10-CM

## 2022-07-26 DIAGNOSIS — Z11.9 SCREENING EXAMINATION FOR INFECTIOUS DISEASE: ICD-10-CM

## 2022-07-26 DIAGNOSIS — Z12.31 VISIT FOR SCREENING MAMMOGRAM: ICD-10-CM

## 2022-07-26 DIAGNOSIS — Z98.84 S/P BARIATRIC SURGERY: ICD-10-CM

## 2022-07-26 DIAGNOSIS — I10 HYPERTENSION, UNSPECIFIED TYPE: ICD-10-CM

## 2022-07-26 DIAGNOSIS — Z80.0 FAMILY HISTORY OF COLORECTAL CANCER: ICD-10-CM

## 2022-07-26 PROBLEM — Z98.890 HISTORY OF ORTHOPEDIC SURGERY: Status: ACTIVE | Noted: 2022-07-26

## 2022-07-26 PROBLEM — J30.9 ALLERGIC RHINITIS: Status: ACTIVE | Noted: 2022-07-26

## 2022-07-26 PROBLEM — S86.011A RUPTURE OF RIGHT ACHILLES TENDON: Status: ACTIVE | Noted: 2022-07-26

## 2022-07-26 PROBLEM — T14.8XXA INJURY TO NERVES: Status: ACTIVE | Noted: 2022-07-26

## 2022-07-26 PROBLEM — R15.9 FULL INCONTINENCE OF FECES: Status: ACTIVE | Noted: 2018-11-08

## 2022-07-26 PROCEDURE — 99215 OFFICE O/P EST HI 40 MIN: CPT | Mod: PBBFAC | Performed by: NURSE PRACTITIONER

## 2022-07-26 PROCEDURE — 99212 OFFICE O/P EST SF 10 MIN: CPT | Mod: S$PBB,,, | Performed by: NURSE PRACTITIONER

## 2022-07-26 PROCEDURE — 99212 PR OFFICE/OUTPT VISIT, EST, LEVL II, 10-19 MIN: ICD-10-PCS | Mod: S$PBB,,, | Performed by: NURSE PRACTITIONER

## 2022-07-26 RX ORDER — HYDROCODONE BITARTRATE AND ACETAMINOPHEN 5; 325 MG/1; MG/1
1 TABLET ORAL EVERY 6 HOURS PRN
COMMUNITY
Start: 2022-05-27 | End: 2022-10-27

## 2022-07-26 RX ORDER — CETIRIZINE HYDROCHLORIDE 10 MG/1
10 TABLET ORAL DAILY PRN
COMMUNITY
Start: 2022-07-19

## 2022-07-26 RX ORDER — KETOROLAC TROMETHAMINE 10 MG/1
10 TABLET, FILM COATED ORAL EVERY 6 HOURS PRN
COMMUNITY
End: 2023-02-28

## 2022-07-26 RX ORDER — HYOSCYAMINE SULFATE 0.12 MG/5ML
0.12 LIQUID ORAL
COMMUNITY

## 2022-07-26 RX ORDER — ONDANSETRON 4 MG/1
4 TABLET, ORALLY DISINTEGRATING ORAL EVERY 8 HOURS PRN
COMMUNITY
Start: 2022-05-27

## 2022-07-26 RX ORDER — CLOTRIMAZOLE 1 %
CREAM (GRAM) TOPICAL 2 TIMES DAILY
Qty: 60 G | Refills: 1 | Status: SHIPPED | OUTPATIENT
Start: 2022-07-26

## 2022-07-26 RX ORDER — LINACLOTIDE 145 UG/1
145 CAPSULE, GELATIN COATED ORAL DAILY
COMMUNITY
Start: 2022-07-14

## 2022-07-26 NOTE — ASSESSMENT & PLAN NOTE
/83. Wt loss > 50# since bariatric sleeve May 2022.  Not on medications.   Follow low sodium diet, < 2 gm/day (avoid high salty foods such as processed meats/ sausage/dorman/ sandwich meat, chips, pickles, cheese, crackers and soft drinks/ electrolyte replacement drinks).  Avoid tobacco/ alcohol use  Educated on health benefits of at least 5 days/ week of 30 minutes moderate intensity exercise (brisk walking) and 2 or more days/ week of muscle strength activities

## 2022-07-26 NOTE — ASSESSMENT & PLAN NOTE
Established with Neema Valerio. Encouraged to keep follow up appointments as scheduled. Not on medications at this time.

## 2022-07-26 NOTE — ASSESSMENT & PLAN NOTE
BMI 42.69, wt loss > 50# since surgery  Educated on increased risk of disease s/t obesity.  Educated on health benefits of at least 5 days/ week of 30 minutes moderate intensity exercise (brisk walking) and 2 or more days/ week of muscle strength activities (as tolerated).  Eat well balanced diet of fresh fruits/ vegetables, whole grains, lean meats and limit high carbohydrate foods.

## 2022-07-26 NOTE — ASSESSMENT & PLAN NOTE
C/o bilateral feet itching, peeling, dry skin. Worse in the mornings with hot baths.   Rx Clotrimazole topical to affected area as directed.   She has external Dermatologist in [place], will follow up with Dermatologist if no improvement.

## 2022-07-26 NOTE — PROGRESS NOTES
L Walter, NP   [facility]  [address] [facility] [address]      PATIENT NAME: Beverly Lee  : 1975  DATE: 22  MRN: [id number]      Billing Provider: Lauren Watson NP  Level of Service: [place] PREVENTIVE VISIT,EST,40-64  Patient PCP Information     Provider PCP Type    Lauren Watson NP General          Reason for Visit / Chief Complaint: Follow-up (Lab results)       History of Present Illness / Problem Focused Workflow     Beverly Lee presents to the clinic with Follow-up (Lab results)     47 yo AAF for wellness exam. She feels well overall. Denies tobacco/ alcohol. Wellness screenings UTD. Labs completed this morning and reviewed, wnl. Denies fever, chills, night sweats, HA, sore throat, blurred vision, tinnitus, dizziness, cough, CP, SOB, palpitations, wheezing, abdominal pain, poor appetite, loss of taste/ smell, constipation, n/v/d, hematochezia, dysuria, or hematuria.     C/o bilateral feet itching. Dry. Scaly at times. Worse in the morning with hot baths. Tried OTC creams without relief. Had gastric sleeve May 2022. Doing well. Lost > 50# since start of it. Exercising. Following diet plan. Has follow up tomorrow.       Review of Systems     Review of Systems   Constitutional: Negative.    HENT: Negative.    Eyes: Negative.    Respiratory: Negative.    Cardiovascular: Negative.    Gastrointestinal: Negative.    Endocrine: Negative.    Genitourinary: Negative.    Musculoskeletal: Negative.    Skin: Negative.         Feet itching   Allergic/Immunologic: Negative.    Neurological: Negative.    Hematological: Negative.    Psychiatric/Behavioral: Negative.        Medical / Social / Family History     Past Medical History:   Diagnosis Date    Arthritis     Carpal tunnel syndrome     Hypertension     Respiratory distress        Past Surgical History:   Procedure Laterality Date     SECTION      gastric sleeve   05/26/2022    HAND SURGERY      NERVE REPAIR      RECTAL SURGERY      REPAIR OF LIGAMENT OF ANKLE      TONSILLECTOMY      VAGINA SURGERY      vaginal fistula    WISDOM TOOTH EXTRACTION         Social History  Ms. Paul  reports that she has never smoked. She has never used smokeless tobacco. She reports previous alcohol use. She reports that she does not use drugs.    Family History  Ms. Paul's family history includes Arthritis in her mother; COPD in her mother; Hyperlipidemia in her father; Hypertension in her mother; Lung cancer in her mother; Rheum arthritis in her father and sister.    Medications and Allergies     Medications  Medication List with Changes/Refills   New Medications    CLOTRIMAZOLE (LOTRIMIN) 1 % CREAM    Apply topically 2 (two) times daily.   Current Medications    CETIRIZINE (ZYRTEC) 10 MG TABLET    Take 10 mg by mouth daily as needed.    DEXTROAMPHETAMINE-AMPHETAMINE (ADDERALL XR) 30 MG 24 HR CAPSULE        DICLOFENAC SODIUM (VOLTAREN) 1 % GEL    APPLY TOPICALLY TO THE AFFECTED AREA FOUR TIMES DAILY AS NEEDED FOR PAIN    FLUTICASONE PROPIONATE (FLONASE) 50 MCG/ACTUATION NASAL SPRAY    prn    HYDROCODONE-ACETAMINOPHEN (NORCO) 5-325 MG PER TABLET    Take 1 tablet by mouth every 6 (six) hours as needed.    HYOSCYAMINE (LEVSIN) 0.125 MG/5 ML ELIX    Take 0.125 mg by mouth.    KETOROLAC (TORADOL) 10 MG TABLET    Take 10 mg by mouth every 6 (six) hours as needed.    LINZESS 145 MCG CAP CAPSULE    Take 145 mcg by mouth once daily.    MELOXICAM (MOBIC) 15 MG TABLET    TAKE 1 TABLET BY MOUTH DAILY WITH FOOD. AVOID OTHER NSAIDS    MV-MN-IRON FUM-FA-OMEGA3,6,9#3 ORAL    Take 1 tablet by mouth once daily.    ONDANSETRON (ZOFRAN-ODT) 4 MG TBDL    Take 4 mg by mouth every 8 (eight) hours as needed.    PAXIL 30 MG TABLET    Take 30 mg by mouth every morning.    TRAZODONE (DESYREL) 50 MG TABLET    Take  mg by mouth nightly.       Allergies  Review of patient's allergies indicates:   Allergen  Reactions    Macrolide antibiotics     Nitrofurantoin      Other reaction(s): Palpitations  Other reaction(s): Palpitations    Nitrofurantoin monohyd/m-cryst Palpitations       Physical Examination     Vitals:    07/26/22 0951   BP: 117/83   Pulse: 71   Resp: 20   Temp: 98.8 °F (37.1 °C)     Physical Exam  Vitals and nursing note reviewed.   Constitutional:       Appearance: Normal appearance. She is not ill-appearing.   HENT:      Head: Normocephalic.      Right Ear: Tympanic membrane normal.      Left Ear: Tympanic membrane normal.      Nose: Nose normal.      Mouth/Throat:      Mouth: Mucous membranes are moist.   Eyes:      Extraocular Movements: Extraocular movements intact.      Conjunctiva/sclera: Conjunctivae normal.      Pupils: Pupils are equal, round, and reactive to light.   Cardiovascular:      Rate and Rhythm: Normal rate and regular rhythm.      Pulses: Normal pulses.   Pulmonary:      Effort: Pulmonary effort is normal. No respiratory distress.      Breath sounds: Normal breath sounds.   Abdominal:      General: Bowel sounds are normal. There is no distension.      Palpations: Abdomen is soft. There is no mass.      Tenderness: There is no abdominal tenderness.      Hernia: No hernia is present.   Musculoskeletal:         General: Normal range of motion.      Cervical back: Normal range of motion and neck supple.      Right lower leg: No edema.      Left lower leg: No edema.   Skin:     General: Skin is warm and dry.      Capillary Refill: Capillary refill takes less than 2 seconds.      Comments: Bilateral feet without rash, redness, dryness, scaling.   Neurological:      Mental Status: She is alert and oriented to person, place, and time. Mental status is at baseline.      Motor: No weakness.   Psychiatric:         Mood and Affect: Mood normal.         Behavior: Behavior normal.         Thought Content: Thought content normal.         Judgment: Judgment normal.           Results     Lab Results    Component Value Date    WBC 8.1 07/26/2022    RBC 4.39 07/26/2022    HGB 12.7 07/26/2022    HCT 40.2 07/26/2022    MCV 91.6 07/26/2022    MCH 28.9 07/26/2022    MCHC 31.6 (L) 07/26/2022    RDW 14.2 07/26/2022     07/26/2022    MPV 10.2 07/26/2022     CMP  Sodium Level   Date Value Ref Range Status   07/26/2022 140 136 - 145 mmol/L Final     Potassium Level   Date Value Ref Range Status   07/26/2022 3.9 3.5 - 5.1 mmol/L Final     Carbon Dioxide   Date Value Ref Range Status   07/26/2022 29 22 - 29 mmol/L Final     Blood Urea Nitrogen   Date Value Ref Range Status   07/26/2022 9.2 7.0 - 18.7 mg/dL Final     Creatinine   Date Value Ref Range Status   07/26/2022 0.86 0.55 - 1.02 mg/dL Final     Calcium Level Total   Date Value Ref Range Status   07/26/2022 9.5 8.4 - 10.2 mg/dL Final     Albumin Level   Date Value Ref Range Status   07/26/2022 3.8 3.5 - 5.0 gm/dL Final     Bilirubin Total   Date Value Ref Range Status   07/26/2022 0.5 <=1.5 mg/dL Final     Alkaline Phosphatase   Date Value Ref Range Status   07/26/2022 78 40 - 150 unit/L Final     Aspartate Aminotransferase   Date Value Ref Range Status   07/26/2022 20 5 - 34 unit/L Final     Alanine Aminotransferase   Date Value Ref Range Status   07/26/2022 25 0 - 55 unit/L Final     Estimated GFR-Non    Date Value Ref Range Status   01/24/2022 >60 mL/min/1.73 m2 Final     Lab Results   Component Value Date    CHOL 172 07/26/2022     Lab Results   Component Value Date    HDL 41 07/26/2022     No results found for: LDLCALC  Lab Results   Component Value Date    TRIG 110 07/26/2022     No results found for: CHOLHDL  Lab Results   Component Value Date    TSH 2.7768 07/26/2022     Lab Results   Component Value Date    PHUR 5.5 09/24/2020    PROTEINUA Negative 09/24/2020    GLUCUA Negative 09/24/2020    KETONESU Negative 09/24/2020    OCCULTUA Negative 09/24/2020    NITRITE Negative 09/24/2020    LEUKOCYTESUR Negative 09/24/2020           Assessment  and Plan (including Health Maintenance)     Plan:         Health Maintenance Due   Topic Date Due    COVID-19 Vaccine (3 - Booster for Pfizer series) 04/20/2022       Problem List Items Addressed This Visit        Psychiatric    Mixed anxiety depressive disorder    Current Assessment & Plan     Established with Neema Valerio. Encouraged to keep follow up appointments as scheduled. Not on medications at this time.              Derm    Tinea pedis of both feet    Current Assessment & Plan     C/o bilateral feet itching, peeling, dry skin. Worse in the mornings with hot baths.   Rx Clotrimazole topical to affected area as directed.   She has external Dermatologist in [place], will follow up with Dermatologist if no improvement.            Relevant Medications    clotrimazole (LOTRIMIN) 1 % cream       Cardiac/Vascular    Hypertension    Current Assessment & Plan     /83. Wt loss > 50# since bariatric sleeve May 2022.  Not on medications.   Follow low sodium diet, < 2 gm/day (avoid high salty foods such as processed meats/ sausage/dorman/ sandwich meat, chips, pickles, cheese, crackers and soft drinks/ electrolyte replacement drinks).  Avoid tobacco/ alcohol use  Educated on health benefits of at least 5 days/ week of 30 minutes moderate intensity exercise (brisk walking) and 2 or more days/ week of muscle strength activities               Relevant Orders    CBC Auto Differential    Comprehensive Metabolic Panel    Lipid Panel    TSH    Urinalysis       Oncology    Family history of colorectal cancer    Current Assessment & Plan     Last colonoscopy 2019 in [place].               Endocrine    BMI 40.0-44.9, adult    Overview     Last Assessment & Plan:   Formatting of this note might be different from the original.  Initial Wt: 282 lb  Today's Wt: 270  Net: -12 lbs    Goal Pre-op weight loss: 30 lbs  Plan:  Continue daily protein shake  Lean protein, veggies, decrease CHO  Continue exercise regimen- gym  4x/wk.           Current Assessment & Plan     BMI 42.69, wt loss > 50# since surgery  Educated on increased risk of disease s/t obesity.  Educated on health benefits of at least 5 days/ week of 30 minutes moderate intensity exercise (brisk walking) and 2 or more days/ week of muscle strength activities (as tolerated).  Eat well balanced diet of fresh fruits/ vegetables, whole grains, lean meats and limit high carbohydrate foods.              S/P bariatric surgery    Overview     Formatting of this note might be different from the original.  S/p sleeve gastrectomy with hiatal hernia repair on 5/26/22    Last Assessment & Plan:   Formatting of this note might be different from the original.  2 weeks post-op  Initial Wt: 282 lb  Pre-op Wt: 258  Todays Wt: 241    Net since surgery: -17 lbs  Net since joining program: -41 lbs    Plan:  - continue post-gastrectomy diet  - continue vitamins  - increase physical activity           Current Assessment & Plan     Doing well. Has follow up with provider tomorrow. Continue with diet modifications, exercise as tolerated and vitamins as prescribed.             Other Visit Diagnoses     Wellness examination    -  Primary  Wellness labs reviewed  MMG/ PAP/ Colonoscopy UTD  Avoid tobacco/ alcohol  Continue with diet plan and regular exercise as tolerated    Visit for screening mammogram        Relevant Orders    Mammo Digital Screening Bilat    Screening examination for infectious disease        Relevant Orders    RPR w/Rflx Titer    Hepatitis Panel, Acute    HIV 1/2 Ag/Ab (4th Gen)          Health Maintenance Topics with due status: Not Due       Topic Last Completion Date    TETANUS VACCINE 03/02/2013    Influenza Vaccine 03/02/2013    Cervical Cancer Screening 11/18/2021    Mammogram 03/14/2022       Future Appointments   Date Time Provider Department Center   11/16/2022  9:10 AM Denia Quiles, ANP [facility] [place] [facility]   7/26/2023  9:15 AM Lauren Watson NP [facility]  [facility]        Follow up in year for wellness with labs. Contact clinic for sooner appointment if any new condition or symptoms occur.     Signature:  Lauren Watson NP  [facility]  [address] [facility] [address]    Date of encounter: 7/26/22

## 2022-07-26 NOTE — ASSESSMENT & PLAN NOTE
Doing well. Has follow up with provider tomorrow. Continue with diet modifications, exercise as tolerated and vitamins as prescribed.

## 2022-09-20 ENCOUNTER — OFFICE VISIT (OUTPATIENT)
Dept: INTERNAL MEDICINE | Facility: CLINIC | Age: 47
End: 2022-09-20
Payer: MEDICARE

## 2022-09-20 VITALS
HEART RATE: 68 BPM | TEMPERATURE: 99 F | BODY MASS INDEX: 39.2 KG/M2 | DIASTOLIC BLOOD PRESSURE: 74 MMHG | WEIGHT: 213 LBS | HEIGHT: 62 IN | RESPIRATION RATE: 20 BRPM | SYSTOLIC BLOOD PRESSURE: 124 MMHG

## 2022-09-20 DIAGNOSIS — B35.1 ONYCHOMYCOSIS: ICD-10-CM

## 2022-09-20 PROCEDURE — 99214 OFFICE O/P EST MOD 30 MIN: CPT | Mod: PBBFAC | Performed by: NURSE PRACTITIONER

## 2022-09-20 PROCEDURE — 99213 OFFICE O/P EST LOW 20 MIN: CPT | Mod: S$PBB,,, | Performed by: NURSE PRACTITIONER

## 2022-09-20 PROCEDURE — 99213 PR OFFICE/OUTPT VISIT, EST, LEVL III, 20-29 MIN: ICD-10-PCS | Mod: S$PBB,,, | Performed by: NURSE PRACTITIONER

## 2022-09-20 RX ORDER — TERBINAFINE HYDROCHLORIDE 250 MG/1
250 TABLET ORAL DAILY
Qty: 90 TABLET | Refills: 0 | Status: SHIPPED | OUTPATIENT
Start: 2022-09-20 | End: 2022-12-19

## 2022-09-20 NOTE — PROGRESS NOTES
L Walter, NP   [facility]  [address] [facility] [address]      PATIENT NAME: Beverly Lee  : 1975  DATE: 22  MRN: [id number]      Billing Provider: Lauren Watson NP  Level of Service: [place] OFFICE/OUTPT VISIT, EST, LEVL III, 20-29 MIN  Patient PCP Information       Provider PCP Type    Lauren Watson NP General            Reason for Visit / Chief Complaint: Nail Problem (Right great toe)       History of Present Illness / Problem Focused Workflow     Beverly Lee presents to the clinic with Nail Problem (Right great toe)     46 yo AAF for interval visit for toenail fungus to right foot great toe. She noticed after removing artificial toenail at the nail salon. Previously had this before about 8 years ago and was treated with oral therapy with relief. She denies any other concerns/ complaints.       Review of Systems     Review of Systems   Constitutional: Negative.    HENT: Negative.     Eyes: Negative.    Respiratory: Negative.     Cardiovascular: Negative.    Gastrointestinal: Negative.    Endocrine: Negative.    Genitourinary: Negative.    Musculoskeletal: Negative.    Skin: Negative.         Toenail fungus   Allergic/Immunologic: Negative.    Neurological: Negative.    Hematological: Negative.    Psychiatric/Behavioral: Negative.       Medical / Social / Family History     Past Medical History:   Diagnosis Date    Arthritis     Carpal tunnel syndrome     Hypertension     Respiratory distress        Past Surgical History:   Procedure Laterality Date     SECTION      gastric sleeve  2022    HAND SURGERY      NERVE REPAIR      RECTAL SURGERY      REPAIR OF LIGAMENT OF ANKLE      TONSILLECTOMY      VAGINA SURGERY      vaginal fistula    WISDOM TOOTH EXTRACTION         Social History  Ms. Paul  reports that she has never smoked. She has never used smokeless tobacco. She reports that she does not currently  use alcohol. She reports that she does not use drugs.    Family History  Ms. Paul's family history includes Arthritis in her mother; COPD in her mother; Hyperlipidemia in her father; Hypertension in her mother; Lung cancer in her mother; Rheum arthritis in her father and sister.    Medications and Allergies     Medications  Medication List with Changes/Refills   New Medications    TERBINAFINE HCL (LAMISIL) 250 MG TABLET    Take 1 tablet (250 mg total) by mouth once daily.   Current Medications    CETIRIZINE (ZYRTEC) 10 MG TABLET    Take 10 mg by mouth daily as needed.    CLOTRIMAZOLE (LOTRIMIN) 1 % CREAM    Apply topically 2 (two) times daily.    DEXTROAMPHETAMINE-AMPHETAMINE (ADDERALL XR) 30 MG 24 HR CAPSULE        DICLOFENAC SODIUM (VOLTAREN) 1 % GEL    APPLY TOPICALLY TO THE AFFECTED AREA FOUR TIMES DAILY AS NEEDED FOR PAIN    FLUTICASONE PROPIONATE (FLONASE) 50 MCG/ACTUATION NASAL SPRAY    prn    HYDROCODONE-ACETAMINOPHEN (NORCO) 5-325 MG PER TABLET    Take 1 tablet by mouth every 6 (six) hours as needed.    HYOSCYAMINE (LEVSIN) 0.125 MG/5 ML ELIX    Take 0.125 mg by mouth.    KETOROLAC (TORADOL) 10 MG TABLET    Take 10 mg by mouth every 6 (six) hours as needed.    LINZESS 145 MCG CAP CAPSULE    Take 145 mcg by mouth once daily.    MELOXICAM (MOBIC) 15 MG TABLET    TAKE 1 TABLET BY MOUTH DAILY WITH FOOD. AVOID OTHER NSAIDS    MV-MN-IRON FUM-FA-OMEGA3,6,9#3 ORAL    Take 1 tablet by mouth once daily.    ONDANSETRON (ZOFRAN-ODT) 4 MG TBDL    Take 4 mg by mouth every 8 (eight) hours as needed.    PAXIL 30 MG TABLET    Take 30 mg by mouth every morning.    TRAZODONE (DESYREL) 50 MG TABLET    Take  mg by mouth nightly.       Allergies  Review of patient's allergies indicates:   Allergen Reactions    Macrolide antibiotics     Nitrofurantoin      Other reaction(s): Palpitations  Other reaction(s): Palpitations    Nitrofurantoin monohyd/m-cryst Palpitations       Physical Examination     Vitals:    09/20/22 0720    BP: 124/74   Pulse: 68   Resp: 20   Temp: 98.9 °F (37.2 °C)     Physical Exam  Vitals and nursing note reviewed.   Constitutional:       Appearance: Normal appearance. She is not ill-appearing.   HENT:      Head: Normocephalic.   Cardiovascular:      Rate and Rhythm: Normal rate and regular rhythm.      Pulses: Normal pulses.   Pulmonary:      Effort: Pulmonary effort is normal. No respiratory distress.      Breath sounds: Normal breath sounds.   Musculoskeletal:         General: Normal range of motion.      Cervical back: Normal range of motion and neck supple.      Right lower leg: No edema.      Left lower leg: No edema.   Skin:     General: Skin is warm and dry.      Capillary Refill: Capillary refill takes less than 2 seconds.      Comments: Right foot great toe nail with discolored, brittle nail   Neurological:      Mental Status: She is alert and oriented to person, place, and time. Mental status is at baseline.      Motor: No weakness.   Psychiatric:         Mood and Affect: Mood normal.         Behavior: Behavior normal.         Thought Content: Thought content normal.         Judgment: Judgment normal.         Results     Lab Results   Component Value Date    WBC 8.1 07/26/2022    RBC 4.39 07/26/2022    HGB 12.7 07/26/2022    HCT 40.2 07/26/2022    MCV 91.6 07/26/2022    MCH 28.9 07/26/2022    MCHC 31.6 (L) 07/26/2022    RDW 14.2 07/26/2022     07/26/2022    MPV 10.2 07/26/2022     CMP  Sodium Level   Date Value Ref Range Status   07/26/2022 140 136 - 145 mmol/L Final     Potassium Level   Date Value Ref Range Status   07/26/2022 3.9 3.5 - 5.1 mmol/L Final     Carbon Dioxide   Date Value Ref Range Status   07/26/2022 29 22 - 29 mmol/L Final     Blood Urea Nitrogen   Date Value Ref Range Status   07/26/2022 9.2 7.0 - 18.7 mg/dL Final     Creatinine   Date Value Ref Range Status   07/26/2022 0.86 0.55 - 1.02 mg/dL Final     Calcium Level Total   Date Value Ref Range Status   07/26/2022 9.5 8.4 -  10.2 mg/dL Final     Albumin Level   Date Value Ref Range Status   07/26/2022 3.8 3.5 - 5.0 gm/dL Final     Bilirubin Total   Date Value Ref Range Status   07/26/2022 0.5 <=1.5 mg/dL Final     Alkaline Phosphatase   Date Value Ref Range Status   07/26/2022 78 40 - 150 unit/L Final     Aspartate Aminotransferase   Date Value Ref Range Status   07/26/2022 20 5 - 34 unit/L Final     Alanine Aminotransferase   Date Value Ref Range Status   07/26/2022 25 0 - 55 unit/L Final     Estimated GFR-Non    Date Value Ref Range Status   01/24/2022 >60 mL/min/1.73 m2 Final     Lab Results   Component Value Date    CHOL 172 07/26/2022     Lab Results   Component Value Date    HDL 41 07/26/2022     No results found for: LDLCALC  Lab Results   Component Value Date    TRIG 110 07/26/2022     No results found for: CHOLHDL  Lab Results   Component Value Date    TSH 2.7768 07/26/2022     Lab Results   Component Value Date    PHUR 5.5 09/24/2020    PROTEINUA Negative 09/24/2020    GLUCUA Negative 09/24/2020    KETONESU Negative 09/24/2020    OCCULTUA Negative 09/24/2020    NITRITE Negative 09/24/2020    LEUKOCYTESUR Negative 09/24/2020           Assessment and Plan (including Health Maintenance)     Plan:         Health Maintenance Due   Topic Date Due    Cervical Cancer Screening  Never done    COVID-19 Vaccine (3 - Booster for Pfizer series) 04/20/2022    Colorectal Cancer Screening  Never done    Influenza Vaccine (1) 09/01/2022       Problem List Items Addressed This Visit          Derm    Onychomycosis    Current Assessment & Plan     Right foot great toenail with discolored, brittle nail noticed after removal of artificial nail.   Educated to remove nail polish, disinfect all nail equipment or discard if unable to clean  Rx terbinafine as directed x 12 weeks. Avoid alcohol.   If no improvement, will need referral to Dermatology         Relevant Medications    terbinafine HCL (LAMISIL) 250 mg tablet       Health  Maintenance Topics with due status: Not Due       Topic Last Completion Date    TETANUS VACCINE 03/02/2013    Mammogram 03/14/2022    Lipid Panel 07/26/2022       Future Appointments   Date Time Provider Department Center   11/16/2022  9:10 AM DEMI [facility]   7/26/2023  9:15 AM [facility]            Signature:  Lauren Watson NP  [facility]  [address] [facility] [address]    Date of encounter: 9/20/22

## 2022-09-20 NOTE — ASSESSMENT & PLAN NOTE
Right foot great toenail with discolored, brittle nail noticed after removal of artificial nail.   Educated to remove nail polish, disinfect all nail equipment or discard if unable to clean  Rx terbinafine as directed x 12 weeks. Avoid alcohol.   If no improvement, will need referral to Dermatology

## 2022-10-11 ENCOUNTER — HOSPITAL ENCOUNTER (OUTPATIENT)
Dept: RADIOLOGY | Facility: CLINIC | Age: 47
Discharge: HOME | End: 2022-10-11
Attending: ORTHOPAEDIC SURGERY
Payer: MEDICARE

## 2022-10-11 ENCOUNTER — OFFICE VISIT (OUTPATIENT)
Dept: ORTHOPEDICS | Facility: CLINIC | Age: 47
End: 2022-10-11
Payer: MEDICARE

## 2022-10-11 VITALS
BODY MASS INDEX: 39.01 KG/M2 | HEART RATE: 74 BPM | SYSTOLIC BLOOD PRESSURE: 138 MMHG | DIASTOLIC BLOOD PRESSURE: 80 MMHG | HEIGHT: 62 IN | WEIGHT: 212 LBS

## 2022-10-11 DIAGNOSIS — M25.561 ACUTE PAIN OF RIGHT KNEE: ICD-10-CM

## 2022-10-11 DIAGNOSIS — M17.11 PRIMARY OSTEOARTHRITIS OF RIGHT KNEE: Primary | ICD-10-CM

## 2022-10-11 PROBLEM — Z98.84 S/P BARIATRIC SURGERY: Chronic | Status: ACTIVE | Noted: 2022-06-08

## 2022-10-11 PROCEDURE — 99213 OFFICE O/P EST LOW 20 MIN: CPT | Mod: ,,, | Performed by: ORTHOPAEDIC SURGERY

## 2022-10-11 PROCEDURE — 99213 PR OFFICE/OUTPT VISIT, EST, LEVL III, 20-29 MIN: ICD-10-PCS | Mod: ,,, | Performed by: ORTHOPAEDIC SURGERY

## 2022-10-11 PROCEDURE — 73564 X-RAY EXAM KNEE 4 OR MORE: CPT | Mod: RT,,, | Performed by: ORTHOPAEDIC SURGERY

## 2022-10-11 PROCEDURE — 73564 XR KNEE COMP 4 OR MORE VIEWS RIGHT: ICD-10-PCS | Mod: RT,,, | Performed by: ORTHOPAEDIC SURGERY

## 2022-10-11 RX ORDER — SUCRALFATE 1 G/1
1 TABLET ORAL 4 TIMES DAILY
COMMUNITY
Start: 2022-10-10

## 2022-10-11 RX ORDER — ALUMINUM HYDROXIDE, MAGNESIUM HYDROXIDE, AND SIMETHICONE 2400; 240; 2400 MG/30ML; MG/30ML; MG/30ML
5 SUSPENSION ORAL EVERY 6 HOURS PRN
COMMUNITY
Start: 2022-10-08 | End: 2022-10-22

## 2022-10-11 RX ORDER — OMEPRAZOLE 20 MG/1
20 CAPSULE, DELAYED RELEASE ORAL DAILY
COMMUNITY
Start: 2022-10-10

## 2022-10-11 NOTE — PROGRESS NOTES
[facility]  Orthopedic Clinic Note      Chief Complaint:   Chief Complaint   Patient presents with    Right Knee - Pain    Knee Pain     right knee pain, started in 2014, been getting inj from Dr Collins, last inj in 4/2022, requesting inj     Referring Physician: No ref. provider found      History of Present Illness:    This is a patient last seen in the past for an Achilles tendon rupture that had wound healing issues and had a debridement but finally healed from the debridement and Achilles rupture. She comes in today for Right knee pain that has been experiencing for the last year. She has had injections in her right knee previously. She states it was somewhat helpful.  9/16/2021 patient presents for follow-up on right knee pain. She received a steroid injection at her last visit on 7/27/2021. She reports that the injection did help, but her symptoms were only relieved for about 1 week. Her symptoms have returned to their prior severity.  9/30/2021 Pt presents for right knee Synvisc injection today. She is ready to proceed with right knee Synvisc injection. Her right knee symptoms are unchanged since her prior visit.  She also complains of left ankle pain x1 week. She reports that she got out of bed and felt some immediate pain in her left ankle. This was associated with some swelling. She has a history of a prior right Achilles tendon repair. She reports no impact to her range of motion, just some tenderness and pain with weightbearing on the left lower extremity.  11/30/2021 Patient presents for 8 week follow up on right knee Synvisc injection. She reports improvement in her symptoms since the injection, with only intermittent pain that typically occurs after she has been more active than normal. She continues to apply her topical analgesic and take Ibuprofen as needed for pain. She is still participating in PT for her left ankle pain and reports improvement in her symptoms.  4/14/2022 patient  presents for right knee Synvisc injection today. Her symptoms are unchanged since her prior visit.  10/11/2022 patient presents for re-evaluation of right knee pain.  She received a right knee viscosupplementation injection her prior visit.  She reports that she did extremely well following the viscosupplementation injection.  Her symptoms are slowly returning.  She states that today her pain is a 2/10 in severity.      Past Medical History:   Diagnosis Date    Arthritis     Carpal tunnel syndrome     Gastritis     Hypertension     Respiratory distress        Past Surgical History:   Procedure Laterality Date     SECTION      gastric sleeve  2022    HAND SURGERY      NERVE REPAIR      RECTAL SURGERY      REPAIR OF LIGAMENT OF ANKLE      TONSILLECTOMY      VAGINA SURGERY      vaginal fistula    WISDOM TOOTH EXTRACTION         Current Outpatient Medications   Medication Sig    aluminum & magnesium hydroxide-simethicone (MYLANTA MAX STRENGTH) 400-400-40 mg/5 mL suspension Take 5 mLs by mouth every 6 (six) hours as needed.    cetirizine (ZYRTEC) 10 MG tablet Take 10 mg by mouth daily as needed.    clotrimazole (LOTRIMIN) 1 % cream Apply topically 2 (two) times daily.    dextroamphetamine-amphetamine (ADDERALL XR) 30 MG 24 hr capsule     diclofenac sodium (VOLTAREN) 1 % Gel APPLY TOPICALLY TO THE AFFECTED AREA FOUR TIMES DAILY AS NEEDED FOR PAIN    fluticasone propionate (FLONASE) 50 mcg/actuation nasal spray prn    hyoscyamine (LEVSIN) 0.125 mg/5 mL Elix Take 0.125 mg by mouth.    ketorolac (TORADOL) 10 mg tablet Take 10 mg by mouth every 6 (six) hours as needed.    LINZESS 145 mcg Cap capsule Take 145 mcg by mouth once daily.    meloxicam (MOBIC) 15 MG tablet TAKE 1 TABLET BY MOUTH DAILY WITH FOOD. AVOID OTHER NSAIDS    MV-MN-IRON FUM-FA-OMEGA3,6,9#3 ORAL Take 1 tablet by mouth once daily.    omeprazole (PRILOSEC) 20 MG capsule Take 20 mg by mouth once daily.    ondansetron (ZOFRAN-ODT) 4 MG TbDL Take 4  "mg by mouth every 8 (eight) hours as needed.    PAXIL 30 mg tablet Take 30 mg by mouth every morning.    sucralfate (CARAFATE) 1 gram tablet Take 1 g by mouth 4 (four) times daily.    terbinafine HCL (LAMISIL) 250 mg tablet Take 1 tablet (250 mg total) by mouth once daily.    traZODone (DESYREL) 50 MG tablet Take  mg by mouth nightly.    HYDROcodone-acetaminophen (NORCO) 5-325 mg per tablet Take 1 tablet by mouth every 6 (six) hours as needed.     No current facility-administered medications for this visit.       Review of patient's allergies indicates:   Allergen Reactions    Macrolide antibiotics     Nitrofurantoin      Other reaction(s): Palpitations  Other reaction(s): Palpitations    Nitrofurantoin monohyd/m-cryst Palpitations       Family History   Problem Relation Age of Onset    COPD Mother     Arthritis Mother     Lung cancer Mother     Hypertension Mother     Hyperlipidemia Father     Rheum arthritis Father     Rheum arthritis Sister        Social History     Socioeconomic History    Marital status: Unknown   Tobacco Use    Smoking status: Never    Smokeless tobacco: Never   Substance and Sexual Activity    Alcohol use: Not Currently    Drug use: Never   Social History Narrative    ** Merged History Encounter **          Social Determinants of Health     Physical Activity: Insufficiently Active    Days of Exercise per Week: 3 days    Minutes of Exercise per Session: 30 min           Review of Systems:  All review of systems negative except for those stated in the HPI.    Examination:    Vital Signs:    Vitals:    10/11/22 0828   BP: 138/80   Pulse: 74   Weight: 96.2 kg (212 lb)   Height: 5' 2" (1.575 m)   PainSc:   2       Body mass index is 38.78 kg/m².    Physical Examination:  General: Well-developed, well-nourished.  Neuro: Alert and oriented x 3.  Psych: Normal mood and affect.  Right knee Exam:  No obvious deformity. Range of motion from 0-130 degrees. Increased subluxation of the kneecap " medially and laterally greater than 1 cm. Negative patella tendon tenderness. Negative J sign. Negative Lachman and anterior drawer test. Negative posterior drawer test. Negative varus and valgus stress test. Negative medial joint line tenderness. Negative lateral joint line tenderness. 4/5 strength and normal skin appearance. Sensibility normal.      Imaging: X-rays ordered and images interpreted today personally by me of four views of the right knee demonstrate degenerative changes with joint space narrowing, particularly in the medial aspect of the tibial femoral compartment and the patellofemoral compartment of the knee.        Assessment: Primary osteoarthritis of right knee  -     Prior authorization Order    Acute pain of right knee  -     X-Ray Knee Complete 4 Or More Views Right; Future; Expected date: 10/11/2022      Plan:  X-rays reviewed with the patient.  We discussed corticosteroid injection today versus viscosupplementation injection pending approval.  She would like to defer corticosteroid injection today because she did not have much success with them in the past in her knee pain is not severe today.  Plan to proceed with right knee viscosupplementation injection once approval is obtained.  She verbalized understanding of plan of care with no further questions.    Phil Collins MD personally performed the services described in this documentation, including but not limited to patient's history, physical examination, and assessment and plan of care. All medical record entries made by OJSE Noble were performed at his direction and in his presence. The medical record was reviewed and is accurate and complete.       Follow up for Right knee viscosupplementation injection.      DISCLAIMER: This note may have been dictated using voice recognition software and may contain grammatical errors.     NOTE: Consult report sent to referring provider via [employer].

## 2022-10-12 ENCOUNTER — TELEPHONE (OUTPATIENT)
Dept: INTERNAL MEDICINE | Facility: CLINIC | Age: 47
End: 2022-10-12
Payer: MEDICARE

## 2022-10-17 ENCOUNTER — TELEPHONE (OUTPATIENT)
Dept: INTERNAL MEDICINE | Facility: CLINIC | Age: 47
End: 2022-10-17
Payer: MEDICARE

## 2022-10-17 NOTE — TELEPHONE ENCOUNTER
Please contact patient to schedule post glasgow follow up visit with me within 2 weeks. [employer] for virtual audio visit.     Thank you

## 2022-10-20 ENCOUNTER — DOCUMENTATION ONLY (OUTPATIENT)
Dept: INTERNAL MEDICINE | Facility: CLINIC | Age: 47
End: 2022-10-20
Payer: MEDICARE

## 2022-10-25 ENCOUNTER — OFFICE VISIT (OUTPATIENT)
Dept: ORTHOPEDICS | Facility: CLINIC | Age: 47
End: 2022-10-25
Payer: MEDICARE

## 2022-10-25 DIAGNOSIS — M17.11 PRIMARY OSTEOARTHRITIS OF RIGHT KNEE: Primary | ICD-10-CM

## 2022-10-25 PROCEDURE — 20610 PR DRAIN/INJECT LARGE JOINT/BURSA: ICD-10-PCS | Mod: RT,,, | Performed by: ORTHOPAEDIC SURGERY

## 2022-10-25 PROCEDURE — 99499 UNLISTED E&M SERVICE: CPT | Mod: ,,, | Performed by: ORTHOPAEDIC SURGERY

## 2022-10-25 PROCEDURE — 99499 NO LOS: ICD-10-PCS | Mod: ,,, | Performed by: ORTHOPAEDIC SURGERY

## 2022-10-25 PROCEDURE — 20610 DRAIN/INJ JOINT/BURSA W/O US: CPT | Mod: RT,,, | Performed by: ORTHOPAEDIC SURGERY

## 2022-10-25 NOTE — PROCEDURES
Large Joint Aspiration/Injection    Date/Time: 10/25/2022 8:30 AM  Performed by: Phil Collins MD  Authorized by: Phil Collins MD     Consent Done?:  Yes (Verbal)  Indications:  Pain and arthritis  Timeout: prior to procedure the correct patient, procedure, and site was verified    Prep: patient was prepped and draped in usual sterile fashion      Local anesthesia used?: Yes    Local anesthetic:  Topical anesthetic and lidocaine 2% without epinephrine    Details:  Needle Size:  18 G  Approach:  Superior (superolateral)  Location:  Knee  Medications:  48 mg hylan g-f 20 48 mg/6 mL

## 2022-10-25 NOTE — PROGRESS NOTES
[facility]  Orthopedic Clinic Note      Chief Complaint:   Chief Complaint   Patient presents with    Right Knee - Pain    Knee Pain     unable to obtain vitals, here for synvisc one injection right knee     Referring Physician: Phil Collins MD      History of Present Illness:    This is a patient last seen in the past for an Achilles tendon rupture that had wound healing issues and had a debridement but finally healed from the debridement and Achilles rupture. She comes in today for Right knee pain that has been experiencing for the last year. She has had injections in her right knee previously. She states it was somewhat helpful.  9/16/2021 patient presents for follow-up on right knee pain. She received a steroid injection at her last visit on 7/27/2021. She reports that the injection did help, but her symptoms were only relieved for about 1 week. Her symptoms have returned to their prior severity.  9/30/2021 Pt presents for right knee Synvisc injection today. She is ready to proceed with right knee Synvisc injection. Her right knee symptoms are unchanged since her prior visit.  She also complains of left ankle pain x1 week. She reports that she got out of bed and felt some immediate pain in her left ankle. This was associated with some swelling. She has a history of a prior right Achilles tendon repair. She reports no impact to her range of motion, just some tenderness and pain with weightbearing on the left lower extremity.  11/30/2021 Patient presents for 8 week follow up on right knee Synvisc injection. She reports improvement in her symptoms since the injection, with only intermittent pain that typically occurs after she has been more active than normal. She continues to apply her topical analgesic and take Ibuprofen as needed for pain. She is still participating in PT for her left ankle pain and reports improvement in her symptoms.  4/14/2022 patient presents for right knee Synvisc injection  today. Her symptoms are unchanged since her prior visit.  10/11/2022 patient presents for re-evaluation of right knee pain.  She received a right knee viscosupplementation injection her prior visit.  She reports that she did extremely well following the viscosupplementation injection.  Her symptoms are slowly returning.  She states that today her pain is a 2/10 in severity.  10/25/2022 this patient comes in today for right knee Synvisc injection.      Past Medical History:   Diagnosis Date    Arthritis     Carpal tunnel syndrome     Gastritis     Hypertension     Respiratory distress        Past Surgical History:   Procedure Laterality Date     SECTION      gallbladder      gastric sleeve  2022    HAND SURGERY      NERVE REPAIR      RECTAL SURGERY      REPAIR OF LIGAMENT OF ANKLE      TONSILLECTOMY      VAGINA SURGERY      vaginal fistula    WISDOM TOOTH EXTRACTION         Current Outpatient Medications   Medication Sig    cetirizine (ZYRTEC) 10 MG tablet Take 10 mg by mouth daily as needed.    clotrimazole (LOTRIMIN) 1 % cream Apply topically 2 (two) times daily.    dextroamphetamine-amphetamine (ADDERALL XR) 30 MG 24 hr capsule     diclofenac sodium (VOLTAREN) 1 % Gel APPLY TOPICALLY TO THE AFFECTED AREA FOUR TIMES DAILY AS NEEDED FOR PAIN    fluticasone propionate (FLONASE) 50 mcg/actuation nasal spray prn    HYDROcodone-acetaminophen (NORCO) 5-325 mg per tablet Take 1 tablet by mouth every 6 (six) hours as needed.    hyoscyamine (LEVSIN) 0.125 mg/5 mL Elix Take 0.125 mg by mouth.    ketorolac (TORADOL) 10 mg tablet Take 10 mg by mouth every 6 (six) hours as needed.    LINZESS 145 mcg Cap capsule Take 145 mcg by mouth once daily.    meloxicam (MOBIC) 15 MG tablet TAKE 1 TABLET BY MOUTH DAILY WITH FOOD. AVOID OTHER NSAIDS    MV-MN-IRON FUM-FA-OMEGA3,6,9#3 ORAL Take 1 tablet by mouth once daily.    omeprazole (PRILOSEC) 20 MG capsule Take 20 mg by mouth once daily.    ondansetron (ZOFRAN-ODT) 4 MG  TbDL Take 4 mg by mouth every 8 (eight) hours as needed.    PAXIL 30 mg tablet Take 30 mg by mouth every morning.    sucralfate (CARAFATE) 1 gram tablet Take 1 g by mouth 4 (four) times daily.    terbinafine HCL (LAMISIL) 250 mg tablet Take 1 tablet (250 mg total) by mouth once daily.    traZODone (DESYREL) 50 MG tablet Take  mg by mouth nightly.    aluminum & magnesium hydroxide-simethicone (MYLANTA MAX STRENGTH) 400-400-40 mg/5 mL suspension Take 5 mLs by mouth every 6 (six) hours as needed.     No current facility-administered medications for this visit.       Review of patient's allergies indicates:   Allergen Reactions    Macrolide antibiotics     Nitrofurantoin      Other reaction(s): Palpitations  Other reaction(s): Palpitations    Nitrofurantoin monohyd/m-cryst Palpitations       Family History   Problem Relation Age of Onset    COPD Mother     Arthritis Mother     Lung cancer Mother     Hypertension Mother     Hyperlipidemia Father     Rheum arthritis Father     Rheum arthritis Sister        Social History     Socioeconomic History    Marital status: Unknown   Tobacco Use    Smoking status: Never    Smokeless tobacco: Never   Substance and Sexual Activity    Alcohol use: Not Currently    Drug use: Never    Sexual activity: Yes     Partners: Male   Social History Narrative    ** Merged History Encounter **          Social Determinants of Health     Physical Activity: Insufficiently Active    Days of Exercise per Week: 3 days    Minutes of Exercise per Session: 30 min           Review of Systems:  All review of systems negative except for those stated in the HPI.    Examination:    Vital Signs:    There were no vitals filed for this visit.      There is no height or weight on file to calculate BMI.    Physical Examination:  General: Well-developed, well-nourished.  Neuro: Alert and oriented x 3.  Psych: Normal mood and affect.  Right knee Exam:  No obvious deformity. Range of motion from 0-130 degrees.  Increased subluxation of the kneecap medially and laterally greater than 1 cm. Negative patella tendon tenderness. Negative J sign. Negative Lachman and anterior drawer test. Negative posterior drawer test. Negative varus and valgus stress test. Negative medial joint line tenderness. Negative lateral joint line tenderness. 4/5 strength and normal skin appearance. Sensibility normal.      Imaging: X-rays ordered and images interpreted today personally by me of four views of the right knee demonstrate degenerative changes with joint space narrowing, particularly in the medial aspect of the tibial femoral compartment and the patellofemoral compartment of the knee.        Assessment: Primary osteoarthritis of right knee  -     Large Joint Aspiration/Injection        Plan:  This patient received a right knee Synvisc injection today.  I will have her follow-up in 2-3 months.    Phil Collins MD personally performed the services described in this documentation, including but not limited to patient's history, physical examination, and assessment and plan of care. All medical record entries made by JOSE Noble were performed at his direction and in his presence. The medical record was reviewed and is accurate and complete.    Large Joint Aspiration/Injection    Date/Time: 10/25/2022 8:30 AM  Performed by: Phil Collins MD  Authorized by: Phil Collins MD     Consent Done?:  Yes (Verbal)  Indications:  Pain and arthritis  Timeout: prior to procedure the correct patient, procedure, and site was verified    Prep: patient was prepped and draped in usual sterile fashion      Local anesthesia used?: Yes    Local anesthetic:  Topical anesthetic and lidocaine 2% without epinephrine    Details:  Needle Size:  18 G  Approach:  Superior (superolateral)  Location:  Knee  Medications:  48 mg hylan g-f 20 48 mg/6 mL    No follow-ups on file.      DISCLAIMER: This note may have been dictated using voice recognition software and may  contain grammatical errors.     NOTE: Consult report sent to referring provider via [employer].

## 2022-10-27 ENCOUNTER — OFFICE VISIT (OUTPATIENT)
Dept: INTERNAL MEDICINE | Facility: CLINIC | Age: 47
End: 2022-10-27
Payer: MEDICARE

## 2022-10-27 DIAGNOSIS — Z90.49 S/P LAPAROSCOPIC CHOLECYSTECTOMY: ICD-10-CM

## 2022-10-27 PROCEDURE — 99441 PR PHYSICIAN TELEPHONE EVALUATION 5-10 MIN: ICD-10-PCS | Mod: 95,,, | Performed by: NURSE PRACTITIONER

## 2022-10-27 PROCEDURE — 99441 PR PHYSICIAN TELEPHONE EVALUATION 5-10 MIN: CPT | Mod: 95,,, | Performed by: NURSE PRACTITIONER

## 2022-10-27 RX ORDER — OXYCODONE AND ACETAMINOPHEN 5; 325 MG/1; MG/1
1 TABLET ORAL EVERY 6 HOURS PRN
COMMUNITY
Start: 2022-10-14 | End: 2022-10-27

## 2022-10-27 RX ORDER — FERROUS SULFATE, DRIED 160(50) MG
1 TABLET, EXTENDED RELEASE ORAL
COMMUNITY

## 2022-10-27 NOTE — ASSESSMENT & PLAN NOTE
She underwent lap choly 10/13/22 with Dr. Beaver with improved abdominal pain/ n/v/d. She had gastric sleeve May 2022 with Dr. Beaver. She reports no symptoms at this time. Reports incisions are healing well without drainage/ redness, fever, chills. She has f/u with surgeon scheduled- encouraged to keep appointment. Advised if any abdominal pain, f/c, n/v/d, redness or drainage to incisional sites to report to ER for evaluation.

## 2022-10-27 NOTE — PROGRESS NOTES
Established Patient - Audio Only Telehealth Visit     The patient location is: home  The chief complaint leading to consultation is: post glasgow f/u- choly   Visit type: Virtual visit with audio only (telephone)  Total time spent with patient: 5 minutes        The reason for the audio only service rather than synchronous audio and video virtual visit was related to technical difficulties or patient preference/necessity.     Each patient to whom I provide medical services by telemedicine is:  (1) informed of the relationship between the physician and patient and the respective role of any other health care provider with respect to management of the patient; and (2) notified that they may decline to receive medical services by telemedicine and may withdraw from such care at any time. Patient verbally consented to receive this service via voice-only telephone call.       HPI:   46 yo AAF with pmh of HTN, anal sphincter incontinence, anxiety/ depression, AR, obesity and h/o gastric sleeve May 2022 presents for post glasgow visit from [facility] inpatient stay. She underwent lap choly on 10/13/22 s/t abdominal pain/ n/v/d and poor po intake. She reports doing well since discharge on 10/14/22. She denies any abdominal pain, n/v/d, fever, chills or redness/ drainage to incisional sites. She has upcoming follow up with surgeon/ bariatric surgeon Dr. Beaver. She denies any concerns/ complaints at this time.     Other providers   [facility]  Dr. Collins- Orthopedics  Dr. Beaver- Bariatrics  Neemariki Valerio- Mental Health     Medication List with Changes/Refills   Current Medications    ALUMINUM & MAGNESIUM HYDROXIDE-SIMETHICONE (MYLANTA MAX STRENGTH) 400-400-40 MG/5 ML SUSPENSION    Take 5 mLs by mouth every 6 (six) hours as needed.    CALCIUM-VITAMIN D3 (OS-FIGUEROA 500 + D3) 500 MG-5 MCG (200 UNIT) PER TABLET    Take 1 tablet by mouth.    CETIRIZINE (ZYRTEC) 10 MG TABLET    Take 10 mg by mouth daily as needed.    CLOTRIMAZOLE (LOTRIMIN) 1 % CREAM     Apply topically 2 (two) times daily.    DEXTROAMPHETAMINE-AMPHETAMINE (ADDERALL XR) 30 MG 24 HR CAPSULE        DICLOFENAC SODIUM (VOLTAREN) 1 % GEL    APPLY TOPICALLY TO THE AFFECTED AREA FOUR TIMES DAILY AS NEEDED FOR PAIN    FLUTICASONE PROPIONATE (FLONASE) 50 MCG/ACTUATION NASAL SPRAY    prn    HYOSCYAMINE (LEVSIN) 0.125 MG/5 ML ELIX    Take 0.125 mg by mouth.    KETOROLAC (TORADOL) 10 MG TABLET    Take 10 mg by mouth every 6 (six) hours as needed.    LINZESS 145 MCG CAP CAPSULE    Take 145 mcg by mouth once daily.    MELOXICAM (MOBIC) 15 MG TABLET    TAKE 1 TABLET BY MOUTH DAILY WITH FOOD. AVOID OTHER NSAIDS    MV-MN-IRON FUM-FA-OMEGA3,6,9#3 ORAL    Take 1 tablet by mouth once daily.    OMEPRAZOLE (PRILOSEC) 20 MG CAPSULE    Take 20 mg by mouth once daily.    ONDANSETRON (ZOFRAN-ODT) 4 MG TBDL    Take 4 mg by mouth every 8 (eight) hours as needed.    PAXIL 30 MG TABLET    Take 30 mg by mouth every morning.    SUCRALFATE (CARAFATE) 1 GRAM TABLET    Take 1 g by mouth 4 (four) times daily.    TERBINAFINE HCL (LAMISIL) 250 MG TABLET    Take 1 tablet (250 mg total) by mouth once daily.    TRAZODONE (DESYREL) 50 MG TABLET    Take  mg by mouth nightly.   Discontinued Medications    HYDROCODONE-ACETAMINOPHEN (NORCO) 5-325 MG PER TABLET    Take 1 tablet by mouth every 6 (six) hours as needed.    OXYCODONE-ACETAMINOPHEN (PERCOCET) 5-325 MG PER TABLET    Take 1 tablet by mouth every 6 (six) hours as needed.              Assessment and plan:    Problem List Items Addressed This Visit          GI    S/P laparoscopic cholecystectomy    Current Assessment & Plan     [facility] discharge summary report reviewed.   She underwent lap choly 10/13/22 with Dr. Beaver with improved abdominal pain/ n/v/d. She had gastric sleeve May 2022 with Dr. Beaver. She reports no symptoms at this time. Reports incisions are healing well without drainage/ redness, fever, chills. She has f/u with surgeon scheduled- encouraged to keep appointment.  Advised if any abdominal pain, f/c, n/v/d, redness or drainage to incisional sites to report to ER for evaluation.           Follow up as scheduled for routine wellness next year or sooner if needed.                         This service was not originating from a related E/M service provided within the previous 7 days nor will  to an E/M service or procedure within the next 24 hours or my soonest available appointment.  Prevailing standard of care was able to be met in this audio-only visit.

## 2022-10-31 ENCOUNTER — HOSPITAL ENCOUNTER (OUTPATIENT)
Dept: RADIOLOGY | Facility: CLINIC | Age: 47
Discharge: HOME | End: 2022-10-31
Attending: STUDENT IN AN ORGANIZED HEALTH CARE EDUCATION/TRAINING PROGRAM
Payer: MEDICARE

## 2022-10-31 ENCOUNTER — OFFICE VISIT (OUTPATIENT)
Dept: ORTHOPEDICS | Facility: CLINIC | Age: 47
End: 2022-10-31
Payer: MEDICARE

## 2022-10-31 VITALS
DIASTOLIC BLOOD PRESSURE: 80 MMHG | WEIGHT: 212 LBS | HEART RATE: 61 BPM | SYSTOLIC BLOOD PRESSURE: 115 MMHG | HEIGHT: 62 IN | BODY MASS INDEX: 39.01 KG/M2 | TEMPERATURE: 99 F

## 2022-10-31 DIAGNOSIS — M25.531 RIGHT WRIST PAIN: ICD-10-CM

## 2022-10-31 DIAGNOSIS — M67.431 GANGLION CYST OF VOLAR ASPECT OF RIGHT WRIST: ICD-10-CM

## 2022-10-31 DIAGNOSIS — M25.531 RIGHT WRIST PAIN: Primary | ICD-10-CM

## 2022-10-31 PROCEDURE — 99203 OFFICE O/P NEW LOW 30 MIN: CPT | Mod: ,,, | Performed by: STUDENT IN AN ORGANIZED HEALTH CARE EDUCATION/TRAINING PROGRAM

## 2022-10-31 PROCEDURE — 73110 XR WRIST COMPLETE 3 VIEWS RIGHT: ICD-10-PCS | Mod: RT,,, | Performed by: STUDENT IN AN ORGANIZED HEALTH CARE EDUCATION/TRAINING PROGRAM

## 2022-10-31 PROCEDURE — 99203 PR OFFICE/OUTPT VISIT, NEW, LEVL III, 30-44 MIN: ICD-10-PCS | Mod: ,,, | Performed by: STUDENT IN AN ORGANIZED HEALTH CARE EDUCATION/TRAINING PROGRAM

## 2022-10-31 PROCEDURE — 73110 X-RAY EXAM OF WRIST: CPT | Mod: RT,,, | Performed by: STUDENT IN AN ORGANIZED HEALTH CARE EDUCATION/TRAINING PROGRAM

## 2022-10-31 NOTE — PROGRESS NOTES
Chief Complaint:  Right wrist pain    Consulting Physician: No ref. provider found    History of present illness:    Patient is a 47-year-old female who presents for her initial evaluation of her right wrist pain.  She states she has had this for several months.  She has get history of right upper extremity issues including multiple carpal tunnel releases as well as cubital tunnel releases.  She states that this pain is different.  She points to the volar radial wrist and states that occasionally she has a long that presents itself in this area.  He gets larger and smaller.  The pain is localized to the radial wrist and worse with motion.  She states that she had gallbladder surgery 2 weeks ago and since she has been taking over-the-counter medication her pain is improved significantly to the point where she does not have any pain today.  She also does not notice the mass today.  She wanted to have this evaluated just to make sure it is not related to her previous right upper extremity issues.    Past Medical History:   Diagnosis Date    Arthritis     Carpal tunnel syndrome     Gastritis     Hypertension     Respiratory distress        Past Surgical History:   Procedure Laterality Date     SECTION      CHOLECYSTECTOMY      gastric sleeve  2022    HAND SURGERY      LAPAROSCOPIC CHOLECYSTECTOMY  10/13/2022    NERVE REPAIR      RECTAL SURGERY      REPAIR OF LIGAMENT OF ANKLE      TONSILLECTOMY      VAGINA SURGERY      vaginal fistula    WISDOM TOOTH EXTRACTION         Current Outpatient Medications   Medication Sig    calcium-vitamin D3 (OS-FIGUEROA 500 + D3) 500 mg-5 mcg (200 unit) per tablet Take 1 tablet by mouth.    cetirizine (ZYRTEC) 10 MG tablet Take 10 mg by mouth daily as needed.    clotrimazole (LOTRIMIN) 1 % cream Apply topically 2 (two) times daily.    dextroamphetamine-amphetamine (ADDERALL XR) 30 MG 24 hr capsule     diclofenac sodium (VOLTAREN) 1 % Gel APPLY TOPICALLY TO THE AFFECTED AREA FOUR  TIMES DAILY AS NEEDED FOR PAIN    fluticasone propionate (FLONASE) 50 mcg/actuation nasal spray prn    hyoscyamine (LEVSIN) 0.125 mg/5 mL Elix Take 0.125 mg by mouth.    ketorolac (TORADOL) 10 mg tablet Take 10 mg by mouth every 6 (six) hours as needed.    LINZESS 145 mcg Cap capsule Take 145 mcg by mouth once daily.    MV-MN-IRON FUM-FA-OMEGA3,6,9#3 ORAL Take 1 tablet by mouth once daily.    omeprazole (PRILOSEC) 20 MG capsule Take 20 mg by mouth once daily.    ondansetron (ZOFRAN-ODT) 4 MG TbDL Take 4 mg by mouth every 8 (eight) hours as needed.    PAXIL 30 mg tablet Take 30 mg by mouth every morning.    terbinafine HCL (LAMISIL) 250 mg tablet Take 1 tablet (250 mg total) by mouth once daily.    traZODone (DESYREL) 50 MG tablet Take  mg by mouth nightly.    aluminum & magnesium hydroxide-simethicone (MYLANTA MAX STRENGTH) 400-400-40 mg/5 mL suspension Take 5 mLs by mouth every 6 (six) hours as needed.    meloxicam (MOBIC) 15 MG tablet TAKE 1 TABLET BY MOUTH DAILY WITH FOOD. AVOID OTHER NSAIDS    sucralfate (CARAFATE) 1 gram tablet Take 1 g by mouth 4 (four) times daily.     No current facility-administered medications for this visit.       Review of patient's allergies indicates:   Allergen Reactions    Macrolide antibiotics     Nitrofurantoin      Other reaction(s): Palpitations  Other reaction(s): Palpitations    Nitrofurantoin monohyd/m-cryst Palpitations       Family History   Problem Relation Age of Onset    COPD Mother     Arthritis Mother     Lung cancer Mother     Hypertension Mother     Hyperlipidemia Father     Rheum arthritis Father     Rheum arthritis Sister        Social History     Socioeconomic History    Marital status: Unknown   Tobacco Use    Smoking status: Never    Smokeless tobacco: Never   Substance and Sexual Activity    Alcohol use: Not Currently    Drug use: Never    Sexual activity: Yes     Partners: Male   Social History Narrative    ** Merged History Encounter **          Social  "Determinants of Health     Physical Activity: Insufficiently Active    Days of Exercise per Week: 3 days    Minutes of Exercise per Session: 30 min       Review of Systems:    Constitution:   Denies chills, fever, and sweats.  HENT:   Denies headaches or blurry vision.  Cardiovascular:  Denies chest pain or irregular heart beat.  Respiratory:   Denies cough or shortness of breath.  Gastrointestinal:  Denies abdominal pain, nausea, or vomiting.  Musculoskeletal:   Denies muscle cramps.  Neurological:   Denies dizziness or focal weakness.  Psychiatric/Behavior: Normal mental status.  Hematology/Lymph:  Denies bleeding problem or easy bruising/bleeding.  Skin:    Denies rash or suspicious lesions.    Examination:    Vital Signs:    Vitals:    10/31/22 0829   BP: 115/80   Pulse: 61   Temp: 98.9 °F (37.2 °C)   Weight: 96.2 kg (212 lb)   Height: 5' 2" (1.575 m)   PainSc: 0-No pain       Body mass index is 38.78 kg/m².    Constitution:   Well-developed, well nourished patient in no acute distress.  Neurological:   Alert and oriented x 3 and cooperative to examination.     Psychiatric/Behavior: Normal mental status.  Respiratory:   No shortness of breath.  Eyes:    Extraoccular muscles intact  Skin:    No scars, rash or suspicious lesions.    MSK:   Right wrist:  No open wounds.  No masses.  No rashes.  Tenderness to palpation over the volar radial wrist.  Full active and passive range of motion of the wrist.  She is neurovascularly intact.  Previous carpal tunnel and cubital tunnel scars are well healed.  Radial pulses 2+ the hand is warm well perfused    Imaging:   X-ray of the right wrist shows no fracture or dislocation     Assessment:  Right volar carpal ganglion cyst    Plan:  Since she is currently asymptomatic we can treat this conservatively with observation.  If this returns and she has pain in the future she may return to clinic for further evaluation.    Follow Up:  As needed  Xray at next visit:  None      "

## 2022-11-16 ENCOUNTER — OFFICE VISIT (OUTPATIENT)
Dept: GYNECOLOGY | Facility: CLINIC | Age: 47
End: 2022-11-16
Payer: MEDICARE

## 2022-11-16 VITALS
WEIGHT: 205.5 LBS | HEART RATE: 68 BPM | RESPIRATION RATE: 18 BRPM | BODY MASS INDEX: 37.81 KG/M2 | SYSTOLIC BLOOD PRESSURE: 112 MMHG | HEIGHT: 62 IN | DIASTOLIC BLOOD PRESSURE: 76 MMHG | TEMPERATURE: 98 F

## 2022-11-16 DIAGNOSIS — Z01.419 ENCOUNTER FOR ANNUAL ROUTINE GYNECOLOGICAL EXAMINATION: Primary | ICD-10-CM

## 2022-11-16 PROCEDURE — 99214 OFFICE O/P EST MOD 30 MIN: CPT | Mod: PBBFAC | Performed by: NURSE PRACTITIONER

## 2022-11-16 PROCEDURE — G0101 CA SCREEN;PELVIC/BREAST EXAM: HCPCS | Mod: GZ,S$PBB,, | Performed by: NURSE PRACTITIONER

## 2022-11-16 PROCEDURE — G0101 PR CA SCREEN;PELVIC/BREAST EXAM: ICD-10-PCS | Mod: GZ,S$PBB,, | Performed by: NURSE PRACTITIONER

## 2022-11-16 NOTE — PROGRESS NOTES
"  Subjective:       Patient ID: Beverly Lee is a 47 y.o. female.    Chief Complaint:  Well Woman      History of Present Illness  The patient is  here for annual exam. Regular monthly cycles, LMP was 10/10/22, lasting 3 days and change pads every 4-5 hours. Pt last saw GYN and uro/gyn for fecal incontinence s/p anal sphincteroplasty and laparoscopic bilateral salpingectomy for undesired fertility on 18, with complication of wound dehiscence. Denies history of abnormal paps. Last pap -NIL and HPV neg. MG-3/14/22 & BIRADS 1. Denies breast or urinary complaints. Denies pelvic pain, abnormal bleeding or discharge. Pt reports no STIs in the past and no concerns. Denies tobacco use. Denies fly hx of breast, ovarian, uterine cancer. Colon cancer in father. Colonoscopy in , f/u in 10 years.     GYN & OB History  Patient's last menstrual period was 10/10/2022 (exact date).       Review of patient's allergies indicates:   Allergen Reactions    Macrolide antibiotics     Nitrofurantoin      Other reaction(s): Palpitations  Other reaction(s): Palpitations    Nitrofurantoin monohyd/m-cryst Palpitations     Past Medical History:   Diagnosis Date    Arthritis     Carpal tunnel syndrome     Gastritis     Hypertension     Respiratory distress      OB History    Para Term  AB Living   7 3     4 3   SAB IAB Ectopic Multiple Live Births   4       3      # Outcome Date GA Lbr Gadiel/2nd Weight Sex Delivery Anes PTL Lv   7 SAB            6 SAB            5 SAB            4 SAB            3 Para      Vag-Spont   KEITH   2 Para      CS-Unspec   KEITH   1 Para      CS-Unspec   KEITH        Review of Systems  Review of Systems    Negative except for pertinent findings for positives per HPI     Objective:    Physical Exam    /76 (BP Location: Left arm, Patient Position: Sitting, BP Method: Large (Automatic))   Pulse 68   Temp 98.2 °F (36.8 °C)   Resp 18   Ht 5' 2" (1.575 m)   Wt 93.2 kg (205 lb 7.5 oz)   " LMP 10/10/2022 (Exact Date)   BMI 37.58 kg/m²   GENERAL: Well-developed female in no acute distress.  SKIN: Normal to inspection, warm and intact.  BREASTS: No masses, lumps, discharge, tenderness.  VULVA: General appearance normal; external genitalia with no lesions or erythema.  VAGINA: Mucosa normal, pink, no discharge or lesions.  CERVIX: Pink, nabothian cyst, no erythema or discharge.  BIMANUAL EXAM: reveals a 12 week-sized uterus. The uterus is non tender. Tien adnexa reveal no evidence of masses, tenderness.  PSYCHIATRIC: Patient is oriented to person, place, and time. Mood and affect are normal.    Assessment:       1. Encounter for annual routine gynecological examination       Plan:   Beverly was seen today for well woman.    Diagnoses and all orders for this visit:    Encounter for annual routine gynecological examination  Pelvic today, pap utd per [facility]COG.  Schedule MG ordered per PCP  Follow up for annual exam.

## 2022-12-11 ENCOUNTER — OFFICE VISIT (OUTPATIENT)
Dept: URGENT CARE | Facility: CLINIC | Age: 47
End: 2022-12-11
Payer: MEDICARE

## 2022-12-11 VITALS
SYSTOLIC BLOOD PRESSURE: 132 MMHG | HEART RATE: 80 BPM | WEIGHT: 200.63 LBS | TEMPERATURE: 98 F | RESPIRATION RATE: 20 BRPM | OXYGEN SATURATION: 100 % | BODY MASS INDEX: 36.92 KG/M2 | DIASTOLIC BLOOD PRESSURE: 85 MMHG | HEIGHT: 62 IN

## 2022-12-11 DIAGNOSIS — H57.89 EYE SWELLING, BILATERAL: ICD-10-CM

## 2022-12-11 DIAGNOSIS — T78.40XA ALLERGIC REACTION, INITIAL ENCOUNTER: Primary | ICD-10-CM

## 2022-12-11 DIAGNOSIS — L29.9 ITCHING: ICD-10-CM

## 2022-12-11 PROCEDURE — 99213 PR OFFICE/OUTPT VISIT, EST, LEVL III, 20-29 MIN: ICD-10-PCS | Mod: S$PBB,,, | Performed by: NURSE PRACTITIONER

## 2022-12-11 PROCEDURE — 99213 OFFICE O/P EST LOW 20 MIN: CPT | Mod: S$PBB,,, | Performed by: NURSE PRACTITIONER

## 2022-12-11 PROCEDURE — 99214 OFFICE O/P EST MOD 30 MIN: CPT | Mod: PBBFAC | Performed by: NURSE PRACTITIONER

## 2022-12-11 RX ORDER — DEXAMETHASONE SODIUM PHOSPHATE 100 MG/10ML
8 INJECTION INTRAMUSCULAR; INTRAVENOUS
Status: COMPLETED | OUTPATIENT
Start: 2022-12-11 | End: 2022-12-11

## 2022-12-11 RX ORDER — METHYLPREDNISOLONE 4 MG/1
TABLET ORAL
Qty: 21 TABLET | Refills: 0 | Status: SHIPPED | OUTPATIENT
Start: 2022-12-11 | End: 2022-12-17

## 2022-12-11 RX ORDER — HYDROXYZINE PAMOATE 25 MG/1
25 CAPSULE ORAL 4 TIMES DAILY
Qty: 28 CAPSULE | Refills: 1 | Status: SHIPPED | OUTPATIENT
Start: 2022-12-11 | End: 2022-12-18

## 2022-12-11 RX ADMIN — DEXAMETHASONE SODIUM PHOSPHATE 8 MG: 100 INJECTION INTRAMUSCULAR; INTRAVENOUS at 06:12

## 2022-12-12 NOTE — PROGRESS NOTES
"Subjective:       Patient ID: Beverly Lee is a 47 y.o. female.    Vitals:  height is 5' 2.01" (1.575 m) and weight is 91 kg (200 lb 9.6 oz). Her oral temperature is 98.1 °F (36.7 °C). Her blood pressure is 132/85 and her pulse is 80. Her respiration is 20 and oxygen saturation is 100%.     Chief Complaint: Eye Problem (Pt sts it started on Friday. )    Patient is a 47-year-old female, here for eye irritation that started on Friday.  Patient states she wore take eyelashes on Friday, states she thinks she may have had a reaction to the glue.  States she noticed swelling to bilateral eyes the following morning, itching.      Constitution: Negative.   Neck: neck negative.   Cardiovascular: Negative.    Eyes:  Positive for eye itching and eyelid swelling.   Respiratory: Negative.       Objective:      Physical Exam   Constitutional: She is oriented to person, place, and time. She appears well-developed.   HENT:   Head: Normocephalic.      Comments: Slight edema and redness to rogelio eyelids.   Eyes: Conjunctivae and EOM are normal. Pupils are equal, round, and reactive to light.   Neck: Neck supple.   Cardiovascular: Normal rate, regular rhythm and normal heart sounds.   Pulmonary/Chest: Effort normal and breath sounds normal.   Musculoskeletal: Normal range of motion.         General: Normal range of motion.   Neurological: She is alert and oriented to person, place, and time.   Skin: Skin is warm and dry.   Psychiatric: Her behavior is normal.   Vitals reviewed.      Assessment:       1. Allergic reaction, initial encounter    2. Eye swelling, bilateral    3. Itching              No results found.   Plan:         Decadron 8mg IM in office.   May start medrol dosepak tomorrow. Medication as ordered. If no improvement in symptoms in 24 hours, any increased or new symptoms then go to ER.     Allergic reaction, initial encounter  -     dexAMETHasone injection 8 mg  -     methylPREDNISolone (MEDROL DOSEPACK) 4 mg tablet; " Take 6 tablets (24 mg total) by mouth once daily for 1 day, THEN 5 tablets (20 mg total) once daily for 1 day, THEN 4 tablets (16 mg total) once daily for 1 day, THEN 3 tablets (12 mg total) once daily for 1 day, THEN 2 tablets (8 mg total) once daily for 1 day, THEN 1 tablet (4 mg total) once daily for 1 day. use as directed.  Dispense: 21 tablet; Refill: 0  -     hydrOXYzine pamoate (VISTARIL) 25 MG Cap; Take 1 capsule (25 mg total) by mouth 4 (four) times daily. for 7 days  Dispense: 28 capsule; Refill: 1    Eye swelling, bilateral  -     dexAMETHasone injection 8 mg  -     methylPREDNISolone (MEDROL DOSEPACK) 4 mg tablet; Take 6 tablets (24 mg total) by mouth once daily for 1 day, THEN 5 tablets (20 mg total) once daily for 1 day, THEN 4 tablets (16 mg total) once daily for 1 day, THEN 3 tablets (12 mg total) once daily for 1 day, THEN 2 tablets (8 mg total) once daily for 1 day, THEN 1 tablet (4 mg total) once daily for 1 day. use as directed.  Dispense: 21 tablet; Refill: 0    Itching  -     dexAMETHasone injection 8 mg  -     methylPREDNISolone (MEDROL DOSEPACK) 4 mg tablet; Take 6 tablets (24 mg total) by mouth once daily for 1 day, THEN 5 tablets (20 mg total) once daily for 1 day, THEN 4 tablets (16 mg total) once daily for 1 day, THEN 3 tablets (12 mg total) once daily for 1 day, THEN 2 tablets (8 mg total) once daily for 1 day, THEN 1 tablet (4 mg total) once daily for 1 day. use as directed.  Dispense: 21 tablet; Refill: 0  -     hydrOXYzine pamoate (VISTARIL) 25 MG Cap; Take 1 capsule (25 mg total) by mouth 4 (four) times daily. for 7 days  Dispense: 28 capsule; Refill: 1

## 2022-12-31 ENCOUNTER — OFFICE VISIT (OUTPATIENT)
Dept: URGENT CARE | Facility: CLINIC | Age: 47
End: 2022-12-31
Payer: MEDICARE

## 2022-12-31 VITALS
DIASTOLIC BLOOD PRESSURE: 90 MMHG | BODY MASS INDEX: 38.11 KG/M2 | RESPIRATION RATE: 16 BRPM | TEMPERATURE: 98 F | WEIGHT: 201.88 LBS | SYSTOLIC BLOOD PRESSURE: 143 MMHG | HEART RATE: 61 BPM | OXYGEN SATURATION: 100 % | HEIGHT: 61 IN

## 2022-12-31 DIAGNOSIS — T14.8XXA BLOOD VESSEL INJURY: Primary | ICD-10-CM

## 2022-12-31 PROCEDURE — 99215 OFFICE O/P EST HI 40 MIN: CPT | Mod: PBBFAC

## 2022-12-31 PROCEDURE — 99213 PR OFFICE/OUTPT VISIT, EST, LEVL III, 20-29 MIN: ICD-10-PCS | Mod: S$PBB,,,

## 2022-12-31 PROCEDURE — 99213 OFFICE O/P EST LOW 20 MIN: CPT | Mod: S$PBB,,,

## 2022-12-31 NOTE — PROGRESS NOTES
"Subjective:       Patient ID: Beverly Lee is a 47 y.o. female.    Vitals:  height is 5' 1.42" (1.56 m) and weight is 91.6 kg (201 lb 14.4 oz). Her temperature is 98.2 °F (36.8 °C). Her blood pressure is 143/90 (abnormal) and her pulse is 61. Her respiration is 16 and oxygen saturation is 100%.     Chief Complaint: Leg Pain (Rt leg pain)    Pt states was driving when she felt a "pop" in her R shin. Swelling and tenderness to the area.    Leg Pain       Constitution: Negative.   HENT: Negative.     Neck: neck negative.   Cardiovascular: Negative.    Eyes: Negative.    Respiratory: Negative.     Gastrointestinal: Negative.    Genitourinary: Negative.    Musculoskeletal: Negative.    Skin:  Positive for bruising.   Allergic/Immunologic: Negative.    Neurological: Negative.      Objective:      Physical Exam   Constitutional: She is oriented to person, place, and time.   HENT:   Head: Normocephalic.   Nose: Nose normal.   Mouth/Throat: Mucous membranes are moist. Oropharynx is clear.   Eyes: Pupils are equal, round, and reactive to light.   Neck: Neck supple.   Cardiovascular: Normal rate and normal pulses.   Pulmonary/Chest: Effort normal.   Abdominal: Normal appearance. Soft.   Musculoskeletal: Normal range of motion.         General: Swelling and tenderness present. Normal range of motion.        Legs:    Neurological: She is alert and oriented to person, place, and time.   Skin: Skin is warm and dry. bruising   Vitals reviewed.      Assessment:       1. Blood vessel injury          Plan:         Blood vessel injury    Swollen, raised, warm area surrounding a varicose vein.    ER precautions given, patient verbalized understanding.     Please see provided patient education for guidance.    Follow up with PCP or return to clinic if symptoms worsen or do not improve.                     "

## 2023-01-25 ENCOUNTER — OFFICE VISIT (OUTPATIENT)
Dept: ORTHOPEDICS | Facility: CLINIC | Age: 48
End: 2023-01-25
Payer: MEDICARE

## 2023-01-25 ENCOUNTER — HOSPITAL ENCOUNTER (OUTPATIENT)
Dept: RADIOLOGY | Facility: CLINIC | Age: 48
Discharge: HOME | End: 2023-01-25
Attending: STUDENT IN AN ORGANIZED HEALTH CARE EDUCATION/TRAINING PROGRAM
Payer: MEDICARE

## 2023-01-25 DIAGNOSIS — M25.531 RIGHT WRIST PAIN: Primary | ICD-10-CM

## 2023-01-25 DIAGNOSIS — M25.531 RIGHT WRIST PAIN: ICD-10-CM

## 2023-01-25 DIAGNOSIS — M67.431 GANGLION CYST OF VOLAR ASPECT OF RIGHT WRIST: ICD-10-CM

## 2023-01-25 PROCEDURE — 73110 XR WRIST COMPLETE 3 VIEWS RIGHT: ICD-10-PCS | Mod: RT,,, | Performed by: STUDENT IN AN ORGANIZED HEALTH CARE EDUCATION/TRAINING PROGRAM

## 2023-01-25 PROCEDURE — 99213 PR OFFICE/OUTPT VISIT, EST, LEVL III, 20-29 MIN: ICD-10-PCS | Mod: ,,, | Performed by: STUDENT IN AN ORGANIZED HEALTH CARE EDUCATION/TRAINING PROGRAM

## 2023-01-25 PROCEDURE — 73110 X-RAY EXAM OF WRIST: CPT | Mod: RT,,, | Performed by: STUDENT IN AN ORGANIZED HEALTH CARE EDUCATION/TRAINING PROGRAM

## 2023-01-25 PROCEDURE — 99213 OFFICE O/P EST LOW 20 MIN: CPT | Mod: ,,, | Performed by: STUDENT IN AN ORGANIZED HEALTH CARE EDUCATION/TRAINING PROGRAM

## 2023-01-26 NOTE — PROGRESS NOTES
Chief Complaint:  Right wrist pain    Consulting Physician: No ref. provider found    History of present illness:    Patient is a 47-year-old female who presents for her follow-up of her right wrist pain.  She states she has had this for several months.  She has get history of right upper extremity issues including multiple carpal tunnel releases as well as cubital tunnel releases.  I saw her in my clinic last right diagnosed her with a right volar carpal ganglion cyst.  It is overall not very symptomatic at times that we are treating this with observation.  She states that the pain has gotten worse.  She localizes the pain to the volar radial wrist worse with motion.  No numbness or tingling into fingertips.    Past Medical History:   Diagnosis Date    Arthritis     Carpal tunnel syndrome     Gastritis     Hypertension     Respiratory distress        Past Surgical History:   Procedure Laterality Date     SECTION      CHOLECYSTECTOMY      gastric sleeve  2022    HAND SURGERY      LAPAROSCOPIC CHOLECYSTECTOMY  10/13/2022    NERVE REPAIR      RECTAL SURGERY      REPAIR OF LIGAMENT OF ANKLE      TONSILLECTOMY      VAGINA SURGERY      vaginal fistula    WISDOM TOOTH EXTRACTION         Current Outpatient Medications   Medication Sig    calcium-vitamin D3 (OS-FIGUEROA 500 + D3) 500 mg-5 mcg (200 unit) per tablet Take 1 tablet by mouth.    cetirizine (ZYRTEC) 10 MG tablet Take 10 mg by mouth daily as needed.    clotrimazole (LOTRIMIN) 1 % cream Apply topically 2 (two) times daily.    dextroamphetamine-amphetamine (ADDERALL XR) 30 MG 24 hr capsule     diclofenac sodium (VOLTAREN) 1 % Gel APPLY TOPICALLY TO THE AFFECTED AREA FOUR TIMES DAILY AS NEEDED FOR PAIN    fluticasone propionate (FLONASE) 50 mcg/actuation nasal spray prn    hyoscyamine (LEVSIN) 0.125 mg/5 mL Elix Take 0.125 mg by mouth.    ketorolac (TORADOL) 10 mg tablet Take 10 mg by mouth every 6 (six) hours as needed.    LINZESS 145 mcg Cap capsule Take  145 mcg by mouth once daily.    meloxicam (MOBIC) 15 MG tablet TAKE 1 TABLET BY MOUTH DAILY WITH FOOD. AVOID OTHER NSAIDS    MV-MN-IRON FUM-FA-OMEGA3,6,9#3 ORAL Take 1 tablet by mouth once daily.    omeprazole (PRILOSEC) 20 MG capsule Take 20 mg by mouth once daily.    ondansetron (ZOFRAN-ODT) 4 MG TbDL Take 4 mg by mouth every 8 (eight) hours as needed.    PAXIL 30 mg tablet Take 30 mg by mouth every morning.    sucralfate (CARAFATE) 1 gram tablet Take 1 g by mouth 4 (four) times daily.    traZODone (DESYREL) 50 MG tablet Take  mg by mouth nightly.    aluminum & magnesium hydroxide-simethicone (MYLANTA MAX STRENGTH) 400-400-40 mg/5 mL suspension Take 5 mLs by mouth every 6 (six) hours as needed.     No current facility-administered medications for this visit.       Review of patient's allergies indicates:   Allergen Reactions    Macrolide antibiotics     Nitrofurantoin      Other reaction(s): Palpitations  Other reaction(s): Palpitations    Nitrofurantoin monohyd/m-cryst Palpitations       Family History   Problem Relation Age of Onset    COPD Mother     Arthritis Mother     Lung cancer Mother     Hypertension Mother     Hyperlipidemia Father     Rheum arthritis Father     Rheum arthritis Sister        Social History     Socioeconomic History    Marital status: Unknown   Tobacco Use    Smoking status: Never    Smokeless tobacco: Never   Substance and Sexual Activity    Alcohol use: Not Currently    Drug use: Never    Sexual activity: Yes     Partners: Male   Social History Narrative    ** Merged History Encounter **          Social Determinants of Health     Physical Activity: Insufficiently Active    Days of Exercise per Week: 3 days    Minutes of Exercise per Session: 30 min       Review of Systems:    Constitution:   Denies chills, fever, and sweats.  HENT:   Denies headaches or blurry vision.  Cardiovascular:  Denies chest pain or irregular heart beat.  Respiratory:   Denies cough or shortness of  breath.  Gastrointestinal:  Denies abdominal pain, nausea, or vomiting.  Musculoskeletal:   Denies muscle cramps.  Neurological:   Denies dizziness or focal weakness.  Psychiatric/Behavior: Normal mental status.  Hematology/Lymph:  Denies bleeding problem or easy bruising/bleeding.  Skin:    Denies rash or suspicious lesions.    Examination:    Vital Signs:    Vitals:    01/25/23 1539   PainSc:   7       There is no height or weight on file to calculate BMI.    Constitution:   Well-developed, well nourished patient in no acute distress.  Neurological:   Alert and oriented x 3 and cooperative to examination.     Psychiatric/Behavior: Normal mental status.  Respiratory:   No shortness of breath.  Eyes:    Extraoccular muscles intact  Skin:    No scars, rash or suspicious lesions.    MSK:   Right wrist:  No open wounds.  No masses.  No rashes.  Tenderness to palpation over the volar radial wrist.  Full active and passive range of motion of the wrist.  She is neurovascularly intact.  Previous carpal tunnel and cubital tunnel scars are well healed.  Radial pulses 2+ the hand is warm well perfused    Imaging:   X-ray of the right wrist shows no fracture or dislocation     Assessment:  Right volar carpal ganglion cyst    Plan:  I would like to get an MRI of the right wrist without contrast to evaluate for volar carpal ganglion cyst.  I will see her back after the MRI results.    Follow Up:  After MRI  Xray at next visit:  None

## 2023-01-30 ENCOUNTER — OFFICE VISIT (OUTPATIENT)
Dept: ORTHOPEDICS | Facility: CLINIC | Age: 48
End: 2023-01-30
Payer: MEDICARE

## 2023-01-30 VITALS
HEART RATE: 67 BPM | WEIGHT: 201 LBS | DIASTOLIC BLOOD PRESSURE: 77 MMHG | HEIGHT: 61 IN | BODY MASS INDEX: 37.95 KG/M2 | SYSTOLIC BLOOD PRESSURE: 132 MMHG

## 2023-01-30 DIAGNOSIS — M18.11 PRIMARY OSTEOARTHRITIS OF FIRST CARPOMETACARPAL JOINT OF RIGHT HAND: Primary | ICD-10-CM

## 2023-01-30 DIAGNOSIS — G56.01 RIGHT CARPAL TUNNEL SYNDROME: ICD-10-CM

## 2023-01-30 PROCEDURE — 20600: ICD-10-PCS | Mod: RT,,, | Performed by: STUDENT IN AN ORGANIZED HEALTH CARE EDUCATION/TRAINING PROGRAM

## 2023-01-30 PROCEDURE — 20600 DRAIN/INJ JOINT/BURSA W/O US: CPT | Mod: RT,,, | Performed by: STUDENT IN AN ORGANIZED HEALTH CARE EDUCATION/TRAINING PROGRAM

## 2023-01-30 PROCEDURE — 99214 PR OFFICE/OUTPT VISIT, EST, LEVL IV, 30-39 MIN: ICD-10-PCS | Mod: 25,,, | Performed by: STUDENT IN AN ORGANIZED HEALTH CARE EDUCATION/TRAINING PROGRAM

## 2023-01-30 PROCEDURE — 99214 OFFICE O/P EST MOD 30 MIN: CPT | Mod: 25,,, | Performed by: STUDENT IN AN ORGANIZED HEALTH CARE EDUCATION/TRAINING PROGRAM

## 2023-01-30 RX ORDER — BETAMETHASONE SODIUM PHOSPHATE AND BETAMETHASONE ACETATE 3; 3 MG/ML; MG/ML
6 INJECTION, SUSPENSION INTRA-ARTICULAR; INTRALESIONAL; INTRAMUSCULAR; SOFT TISSUE
Status: DISCONTINUED | OUTPATIENT
Start: 2023-01-30 | End: 2023-01-30 | Stop reason: HOSPADM

## 2023-01-30 RX ORDER — LIDOCAINE HYDROCHLORIDE 10 MG/ML
1 INJECTION INFILTRATION; PERINEURAL
Status: DISCONTINUED | OUTPATIENT
Start: 2023-01-30 | End: 2023-01-30 | Stop reason: HOSPADM

## 2023-01-30 RX ADMIN — BETAMETHASONE SODIUM PHOSPHATE AND BETAMETHASONE ACETATE 6 MG: 3; 3 INJECTION, SUSPENSION INTRA-ARTICULAR; INTRALESIONAL; INTRAMUSCULAR; SOFT TISSUE at 10:01

## 2023-01-30 RX ADMIN — LIDOCAINE HYDROCHLORIDE 1 ML: 10 INJECTION INFILTRATION; PERINEURAL at 10:01

## 2023-01-30 NOTE — PROGRESS NOTES
Chief Complaint:  Right wrist pain    Consulting Physician: No ref. provider found    History of present illness:    Patient is a 47-year-old female who presents for her follow-up of her right wrist pain.  I saw her in my clinic last where she was complaining of radial wrist pain.  This could have been an occult volar ganglion cyst versus thumb CMC pain.  I sent her to get an MRI she is here for the results.  Her pain is the same.  She complains of pain at the base of the thumb worse with motion.  She also has some numbness in the fingertips that is worse at nighttime.  She has a history of 2 cubital tunnel releases as well as a carpal tunnel release performed in the past.  She states that the numbness in the fingertips bothers her at nighttime.  She does not have any braces.  She is never had an injection.    Past Medical History:   Diagnosis Date    Arthritis     Carpal tunnel syndrome     Gastritis     Hypertension     Respiratory distress        Past Surgical History:   Procedure Laterality Date     SECTION      CHOLECYSTECTOMY      gastric sleeve  2022    HAND SURGERY      LAPAROSCOPIC CHOLECYSTECTOMY  10/13/2022    NERVE REPAIR      RECTAL SURGERY      REPAIR OF LIGAMENT OF ANKLE      TONSILLECTOMY      VAGINA SURGERY      vaginal fistula    WISDOM TOOTH EXTRACTION         Current Outpatient Medications   Medication Sig    calcium-vitamin D3 (OS-FIGUEROA 500 + D3) 500 mg-5 mcg (200 unit) per tablet Take 1 tablet by mouth.    cetirizine (ZYRTEC) 10 MG tablet Take 10 mg by mouth daily as needed.    clotrimazole (LOTRIMIN) 1 % cream Apply topically 2 (two) times daily.    dextroamphetamine-amphetamine (ADDERALL XR) 30 MG 24 hr capsule     diclofenac sodium (VOLTAREN) 1 % Gel APPLY TOPICALLY TO THE AFFECTED AREA FOUR TIMES DAILY AS NEEDED FOR PAIN    fluticasone propionate (FLONASE) 50 mcg/actuation nasal spray prn    hyoscyamine (LEVSIN) 0.125 mg/5 mL Elix Take 0.125 mg by mouth.    ketorolac (TORADOL)  10 mg tablet Take 10 mg by mouth every 6 (six) hours as needed.    LINZESS 145 mcg Cap capsule Take 145 mcg by mouth once daily.    meloxicam (MOBIC) 15 MG tablet TAKE 1 TABLET BY MOUTH DAILY WITH FOOD. AVOID OTHER NSAIDS    MV-MN-IRON FUM-FA-OMEGA3,6,9#3 ORAL Take 1 tablet by mouth once daily.    omeprazole (PRILOSEC) 20 MG capsule Take 20 mg by mouth once daily.    ondansetron (ZOFRAN-ODT) 4 MG TbDL Take 4 mg by mouth every 8 (eight) hours as needed.    PAXIL 30 mg tablet Take 30 mg by mouth every morning.    sucralfate (CARAFATE) 1 gram tablet Take 1 g by mouth 4 (four) times daily.    traZODone (DESYREL) 50 MG tablet Take  mg by mouth nightly.    aluminum & magnesium hydroxide-simethicone (MYLANTA MAX STRENGTH) 400-400-40 mg/5 mL suspension Take 5 mLs by mouth every 6 (six) hours as needed.     No current facility-administered medications for this visit.       Review of patient's allergies indicates:   Allergen Reactions    Macrolide antibiotics     Nitrofurantoin      Other reaction(s): Palpitations  Other reaction(s): Palpitations    Nitrofurantoin monohyd/m-cryst Palpitations       Family History   Problem Relation Age of Onset    COPD Mother     Arthritis Mother     Lung cancer Mother     Hypertension Mother     Hyperlipidemia Father     Rheum arthritis Father     Rheum arthritis Sister        Social History     Socioeconomic History    Marital status: Unknown   Tobacco Use    Smoking status: Never    Smokeless tobacco: Never   Substance and Sexual Activity    Alcohol use: Not Currently    Drug use: Never    Sexual activity: Yes     Partners: Male   Social History Narrative    ** Merged History Encounter **          Social Determinants of Health     Physical Activity: Insufficiently Active    Days of Exercise per Week: 3 days    Minutes of Exercise per Session: 30 min       Review of Systems:    Constitution:   Denies chills, fever, and sweats.  HENT:   Denies headaches or blurry  "vision.  Cardiovascular:  Denies chest pain or irregular heart beat.  Respiratory:   Denies cough or shortness of breath.  Gastrointestinal:  Denies abdominal pain, nausea, or vomiting.  Musculoskeletal:   Denies muscle cramps.  Neurological:   Denies dizziness or focal weakness.  Psychiatric/Behavior: Normal mental status.  Hematology/Lymph:  Denies bleeding problem or easy bruising/bleeding.  Skin:    Denies rash or suspicious lesions.    Examination:    Vital Signs:    Vitals:    01/30/23 1026   BP: 132/77   Pulse: 67   Weight: 91.2 kg (201 lb)   Height: 5' 1" (1.549 m)       Body mass index is 37.98 kg/m².    Constitution:   Well-developed, well nourished patient in no acute distress.  Neurological:   Alert and oriented x 3 and cooperative to examination.     Psychiatric/Behavior: Normal mental status.  Respiratory:   No shortness of breath.  Eyes:    Extraoccular muscles intact  Skin:    No scars, rash or suspicious lesions.    MSK:   Right wrist:  No open wounds.  No masses.  No rashes.  Tenderness to palpation over the volar radial wrist.  Tenderness to palpation at the thumb CMC joint.  Full active and passive range of motion of the wrist.  She is neurovascularly intact.  Previous carpal tunnel and cubital tunnel scars are well healed.  Some diminished sensation in the median nerve distribution compared to the contralateral side.  Radial pulses 2+ the hand is warm well perfused    Imaging:   X-ray of the right wrist shows no fracture or dislocation  MRI of the right wrist without contrast shows some degenerative change at the base of the thumb CMC joint.  There is no obvious ganglion cyst in the volar carpal region.  There is sign of a flattened median nerve suggestive carpal tunnel syndrome     Assessment:  Right thumb CMC osteoarthritis, right recalcitrant carpal tunnel syndrome    Plan:  I will give her an injection into the right thumb CMC joint today.  For the recalcitrant carpal tunnel syndrome I will " give her a carpal tunnel brace that she can wear at nighttime to see if this helps with her symptoms.  I will see her back in 8 weeks to see how she is doing.  If she continues to have numbness in the fingertips we can consider a revision carpal tunnel surgery.    Follow Up:  8 weeks  Xray at next visit:  None

## 2023-01-30 NOTE — PROCEDURES
Small Joint Aspiration/Injection: R thumb CMC: R thumb CMC    Date/Time: 1/30/2023 10:00 AM  Performed by: Cleveland Luna MD  Authorized by: Cleveland Luna MD     Consent Done?:  Yes (Verbal)  Indications:  Pain  Timeout: prior to procedure the correct patient, procedure, and site was verified    Location:  Thumb  Site:  R thumb CMC  Needle size:  25 G  Approach:  Dorsal  Medications:  1 mL LIDOcaine HCL 10 mg/ml (1%) 10 mg/mL (1 %); 6 mg betamethasone acetate-betamethasone sodium phosphate 6 mg/mL  Patient tolerance:  Patient tolerated the procedure well with no immediate complications

## 2023-02-08 ENCOUNTER — PATIENT MESSAGE (OUTPATIENT)
Dept: ADMINISTRATIVE | Facility: HOSPITAL | Age: 48
End: 2023-02-08
Payer: MEDICAID

## 2023-02-28 ENCOUNTER — OFFICE VISIT (OUTPATIENT)
Dept: ORTHOPEDICS | Facility: CLINIC | Age: 48
End: 2023-02-28
Payer: MEDICARE

## 2023-02-28 VITALS
HEART RATE: 65 BPM | DIASTOLIC BLOOD PRESSURE: 103 MMHG | SYSTOLIC BLOOD PRESSURE: 158 MMHG | HEIGHT: 61 IN | WEIGHT: 201 LBS | BODY MASS INDEX: 37.95 KG/M2

## 2023-02-28 DIAGNOSIS — M17.11 PRIMARY OSTEOARTHRITIS OF RIGHT KNEE: Primary | ICD-10-CM

## 2023-02-28 DIAGNOSIS — E66.01 MORBID OBESITY: ICD-10-CM

## 2023-02-28 PROCEDURE — 99214 OFFICE O/P EST MOD 30 MIN: CPT | Mod: ,,, | Performed by: ORTHOPAEDIC SURGERY

## 2023-02-28 PROCEDURE — 99214 PR OFFICE/OUTPT VISIT, EST, LEVL IV, 30-39 MIN: ICD-10-PCS | Mod: ,,, | Performed by: ORTHOPAEDIC SURGERY

## 2023-02-28 RX ORDER — IBUPROFEN 800 MG/1
800 TABLET ORAL 3 TIMES DAILY PRN
Qty: 90 TABLET | Refills: 2 | Status: SHIPPED | OUTPATIENT
Start: 2023-02-28 | End: 2023-10-09

## 2023-02-28 NOTE — PROGRESS NOTES
[facility]  Orthopedic Clinic Note      Chief Complaint:   Chief Complaint   Patient presents with    Right Knee - Follow-up     Synvisc injection for knee helped. Injection has lasted the whole time and it did good. Pt states knee has given out once or twice recently. Pain is very little.     Referring Physician: No ref. provider found      History of Present Illness:    This is a patient last seen in the past for an Achilles tendon rupture that had wound healing issues and had a debridement but finally healed from the debridement and Achilles rupture. She comes in today for Right knee pain that has been experiencing for the last year. She has had injections in her right knee previously. She states it was somewhat helpful.  9/16/2021 patient presents for follow-up on right knee pain. She received a steroid injection at her last visit on 7/27/2021. She reports that the injection did help, but her symptoms were only relieved for about 1 week. Her symptoms have returned to their prior severity.  9/30/2021 Pt presents for right knee Synvisc injection today. She is ready to proceed with right knee Synvisc injection. Her right knee symptoms are unchanged since her prior visit.  She also complains of left ankle pain x1 week. She reports that she got out of bed and felt some immediate pain in her left ankle. This was associated with some swelling. She has a history of a prior right Achilles tendon repair. She reports no impact to her range of motion, just some tenderness and pain with weightbearing on the left lower extremity.  11/30/2021 Patient presents for 8 week follow up on right knee Synvisc injection. She reports improvement in her symptoms since the injection, with only intermittent pain that typically occurs after she has been more active than normal. She continues to apply her topical analgesic and take Ibuprofen as needed for pain. She is still participating in PT for her left ankle pain and reports  improvement in her symptoms.  2022 patient presents for right knee Synvisc injection today. Her symptoms are unchanged since her prior visit.  10/11/2022 patient presents for re-evaluation of right knee pain.  She received a right knee viscosupplementation injection her prior visit.  She reports that she did extremely well following the viscosupplementation injection.  Her symptoms are slowly returning.  She states that today her pain is a 2/10 in severity.  10/25/2022 this patient comes in today for right knee Synvisc injection.  2023 this patient had a Synvisc injection and states that it was helpful.  This was done approximately 4 months ago.      Past Medical History:   Diagnosis Date    Arthritis     Carpal tunnel syndrome     Gastritis     Hypertension     Respiratory distress        Past Surgical History:   Procedure Laterality Date     SECTION      CHOLECYSTECTOMY      gastric sleeve  2022    HAND SURGERY      LAPAROSCOPIC CHOLECYSTECTOMY  10/13/2022    NERVE REPAIR      RECTAL SURGERY      REPAIR OF LIGAMENT OF ANKLE      TONSILLECTOMY      VAGINA SURGERY      vaginal fistula    WISDOM TOOTH EXTRACTION         Current Outpatient Medications   Medication Sig    aluminum & magnesium hydroxide-simethicone (MYLANTA MAX STRENGTH) 400-400-40 mg/5 mL suspension Take 5 mLs by mouth every 6 (six) hours as needed.    calcium-vitamin D3 (OS-FIGUEROA 500 + D3) 500 mg-5 mcg (200 unit) per tablet Take 1 tablet by mouth.    cetirizine (ZYRTEC) 10 MG tablet Take 10 mg by mouth daily as needed.    clotrimazole (LOTRIMIN) 1 % cream Apply topically 2 (two) times daily.    dextroamphetamine-amphetamine (ADDERALL XR) 30 MG 24 hr capsule     diclofenac sodium (VOLTAREN) 1 % Gel APPLY TOPICALLY TO THE AFFECTED AREA FOUR TIMES DAILY AS NEEDED FOR PAIN    fluticasone propionate (FLONASE) 50 mcg/actuation nasal spray prn    hyoscyamine (LEVSIN) 0.125 mg/5 mL Elix Take 0.125 mg by mouth.    ibuprofen  "(ADVIL,MOTRIN) 800 MG tablet Take 1 tablet (800 mg total) by mouth 3 (three) times daily as needed for Pain. take with food    LINZESS 145 mcg Cap capsule Take 145 mcg by mouth once daily.    MV-MN-IRON FUM-FA-OMEGA3,6,9#3 ORAL Take 1 tablet by mouth once daily.    omeprazole (PRILOSEC) 20 MG capsule Take 20 mg by mouth once daily.    ondansetron (ZOFRAN-ODT) 4 MG TbDL Take 4 mg by mouth every 8 (eight) hours as needed.    PAXIL 30 mg tablet Take 30 mg by mouth every morning.    sucralfate (CARAFATE) 1 gram tablet Take 1 g by mouth 4 (four) times daily.    traZODone (DESYREL) 50 MG tablet Take  mg by mouth nightly.     No current facility-administered medications for this visit.       Review of patient's allergies indicates:   Allergen Reactions    Macrolide antibiotics     Nitrofurantoin      Other reaction(s): Palpitations  Other reaction(s): Palpitations    Nitrofurantoin monohyd/m-cryst Palpitations       Family History   Problem Relation Age of Onset    COPD Mother     Arthritis Mother     Lung cancer Mother     Hypertension Mother     Hyperlipidemia Father     Rheum arthritis Father     Rheum arthritis Sister        Social History     Socioeconomic History    Marital status: Unknown   Tobacco Use    Smoking status: Never    Smokeless tobacco: Never   Substance and Sexual Activity    Alcohol use: Not Currently    Drug use: Never    Sexual activity: Yes     Partners: Male   Social History Narrative    ** Merged History Encounter **          Social Determinants of Health     Physical Activity: Insufficiently Active    Days of Exercise per Week: 3 days    Minutes of Exercise per Session: 30 min           Review of Systems:  All review of systems negative except for those stated in the HPI.    Examination:    Vital Signs:    Vitals:    02/28/23 0855   BP: (!) 158/103   Pulse: 65   Weight: 91.2 kg (201 lb)   Height: 5' 1" (1.549 m)         Body mass index is 37.98 kg/m².    Physical Examination:  General: " Well-developed, well-nourished.  Neuro: Alert and oriented x 3.  Psych: Normal mood and affect.    Right knee Exam:  No obvious deformity. Range of motion from 0-130 degrees. Increased subluxation of the kneecap medially and laterally greater than 1 cm. Negative patella tendon tenderness. Negative J sign. Negative Lachman and anterior drawer test. Negative posterior drawer test. Negative varus and valgus stress test. Negative medial joint line tenderness. Negative lateral joint line tenderness. 4/5 strength and normal skin appearance. Sensibility normal.      Imaging: X-rays ordered and images interpreted today personally by me of four views of the right knee demonstrate degenerative changes with joint space narrowing, particularly in the medial aspect of the tibial femoral compartment and the patellofemoral compartment of the knee.        Assessment: Primary osteoarthritis of right knee  -     ibuprofen (ADVIL,MOTRIN) 800 MG tablet; Take 1 tablet (800 mg total) by mouth 3 (three) times daily as needed for Pain. take with food  Dispense: 90 tablet; Refill: 2  -     Prior authorization Order    Morbid obesity        Plan:  I will prescribe the patient a refill on her ibuprofen 800 mg.  We will see her back in 10 weeks and at that time we will perform a 2nd right knee Synvisc injection.  As Far as her weight loss she is done a good job of that and continues to do well with that.    Phil Collins MD personally performed the services described in this documentation, including but not limited to patient's history, physical examination, and assessment and plan of care. All medical record entries made by JOSE Noble were performed at his direction and in his presence. The medical record was reviewed and is accurate and complete.         No follow-ups on file.      DISCLAIMER: This note may have been dictated using voice recognition software and may contain grammatical errors.     NOTE: Consult report sent to  referring provider via [facility] EMR.

## 2023-03-16 ENCOUNTER — HOSPITAL ENCOUNTER (OUTPATIENT)
Dept: RADIOLOGY | Facility: HOSPITAL | Age: 48
Discharge: HOME | End: 2023-03-16
Attending: NURSE PRACTITIONER
Payer: MEDICARE

## 2023-03-16 DIAGNOSIS — Z12.31 VISIT FOR SCREENING MAMMOGRAM: ICD-10-CM

## 2023-03-16 PROCEDURE — 77067 SCR MAMMO BI INCL CAD: CPT | Mod: TC

## 2023-03-16 PROCEDURE — 77067 MAMMO DIGITAL SCREENING BILAT WITH TOMO: ICD-10-PCS | Mod: 26,,, | Performed by: RADIOLOGY

## 2023-03-16 PROCEDURE — 77063 MAMMO DIGITAL SCREENING BILAT WITH TOMO: ICD-10-PCS | Mod: 26,,, | Performed by: RADIOLOGY

## 2023-03-16 PROCEDURE — 77063 BREAST TOMOSYNTHESIS BI: CPT | Mod: 26,,, | Performed by: RADIOLOGY

## 2023-03-16 PROCEDURE — 77067 SCR MAMMO BI INCL CAD: CPT | Mod: 26,,, | Performed by: RADIOLOGY

## 2023-03-17 ENCOUNTER — PATIENT MESSAGE (OUTPATIENT)
Dept: ADMINISTRATIVE | Facility: HOSPITAL | Age: 48
End: 2023-03-17
Payer: MEDICAID

## 2023-03-17 NOTE — TELEPHONE ENCOUNTER
Please inform patient of mammogram results. This is an NP Walter patient:    Mammogram BI-RADS: 1 Negative

## 2023-04-05 ENCOUNTER — OFFICE VISIT (OUTPATIENT)
Dept: ORTHOPEDICS | Facility: CLINIC | Age: 48
End: 2023-04-05
Payer: MEDICARE

## 2023-04-05 VITALS
HEIGHT: 61 IN | HEART RATE: 60 BPM | SYSTOLIC BLOOD PRESSURE: 120 MMHG | DIASTOLIC BLOOD PRESSURE: 81 MMHG | BODY MASS INDEX: 37.42 KG/M2 | WEIGHT: 198.19 LBS

## 2023-04-05 DIAGNOSIS — M18.11 PRIMARY OSTEOARTHRITIS OF FIRST CARPOMETACARPAL JOINT OF RIGHT HAND: Primary | ICD-10-CM

## 2023-04-05 PROCEDURE — 99213 PR OFFICE/OUTPT VISIT, EST, LEVL III, 20-29 MIN: ICD-10-PCS | Mod: ,,, | Performed by: STUDENT IN AN ORGANIZED HEALTH CARE EDUCATION/TRAINING PROGRAM

## 2023-04-05 PROCEDURE — 99213 OFFICE O/P EST LOW 20 MIN: CPT | Mod: ,,, | Performed by: STUDENT IN AN ORGANIZED HEALTH CARE EDUCATION/TRAINING PROGRAM

## 2023-04-05 NOTE — PROGRESS NOTES
Chief Complaint:  Right wrist pain    Consulting Physician: No ref. provider found    History of present illness:    Patient is a 47-year-old female who presents for her follow-up of her right wrist pain.  I saw her in my clinic last time where I diagnosed her with right thumb CMC arthritis and I gave her a injection into the right thumb CMC joint.  She states that this worked very well for her and she still has relief from this.  I also gave her wrist braces for carpal tunnel syndrome after diagnosed her with recalcitrant carpal tunnel syndrome.  These have also helped her.  She is very happy with the way she is improved this conservative treatment would like to continue with conservative treatment.  Past Medical History:   Diagnosis Date    Arthritis     Carpal tunnel syndrome     Gastritis     Hypertension     Respiratory distress        Past Surgical History:   Procedure Laterality Date     SECTION      CHOLECYSTECTOMY      gastric sleeve  2022    HAND SURGERY      LAPAROSCOPIC CHOLECYSTECTOMY  10/13/2022    NERVE REPAIR      RECTAL SURGERY      REPAIR OF LIGAMENT OF ANKLE      TONSILLECTOMY      VAGINA SURGERY      vaginal fistula    WISDOM TOOTH EXTRACTION         Current Outpatient Medications   Medication Sig    calcium-vitamin D3 (OS-FIGUEROA 500 + D3) 500 mg-5 mcg (200 unit) per tablet Take 1 tablet by mouth.    cetirizine (ZYRTEC) 10 MG tablet Take 10 mg by mouth daily as needed.    clotrimazole (LOTRIMIN) 1 % cream Apply topically 2 (two) times daily.    dextroamphetamine-amphetamine (ADDERALL XR) 30 MG 24 hr capsule     diclofenac sodium (VOLTAREN) 1 % Gel APPLY TOPICALLY TO THE AFFECTED AREA FOUR TIMES DAILY AS NEEDED FOR PAIN    fluticasone propionate (FLONASE) 50 mcg/actuation nasal spray prn    hyoscyamine (LEVSIN) 0.125 mg/5 mL Elix Take 0.125 mg by mouth.    ibuprofen (ADVIL,MOTRIN) 800 MG tablet Take 1 tablet (800 mg total) by mouth 3 (three) times daily as needed for Pain. take with  food    LINZESS 145 mcg Cap capsule Take 145 mcg by mouth once daily.    MV-MN-IRON FUM-FA-OMEGA3,6,9#3 ORAL Take 1 tablet by mouth once daily.    omeprazole (PRILOSEC) 20 MG capsule Take 20 mg by mouth once daily.    ondansetron (ZOFRAN-ODT) 4 MG TbDL Take 4 mg by mouth every 8 (eight) hours as needed.    PAXIL 30 mg tablet Take 30 mg by mouth every morning.    sucralfate (CARAFATE) 1 gram tablet Take 1 g by mouth 4 (four) times daily.    traZODone (DESYREL) 50 MG tablet Take  mg by mouth nightly.    aluminum & magnesium hydroxide-simethicone (MYLANTA MAX STRENGTH) 400-400-40 mg/5 mL suspension Take 5 mLs by mouth every 6 (six) hours as needed.     No current facility-administered medications for this visit.       Review of patient's allergies indicates:   Allergen Reactions    Macrolide antibiotics     Nitrofurantoin      Other reaction(s): Palpitations  Other reaction(s): Palpitations    Nitrofurantoin monohyd/m-cryst Palpitations       Family History   Problem Relation Age of Onset    COPD Mother     Arthritis Mother     Lung cancer Mother     Hypertension Mother     Hyperlipidemia Father     Rheum arthritis Father     Rheum arthritis Sister        Social History     Socioeconomic History    Marital status: Unknown   Tobacco Use    Smoking status: Never    Smokeless tobacco: Never   Substance and Sexual Activity    Alcohol use: Not Currently    Drug use: Never    Sexual activity: Yes     Partners: Male   Social History Narrative    ** Merged History Encounter **          Social Determinants of Health     Physical Activity: Insufficiently Active    Days of Exercise per Week: 3 days    Minutes of Exercise per Session: 30 min       Review of Systems:    Constitution:   Denies chills, fever, and sweats.  HENT:   Denies headaches or blurry vision.  Cardiovascular:  Denies chest pain or irregular heart beat.  Respiratory:   Denies cough or shortness of breath.  Gastrointestinal:  Denies abdominal pain, nausea,  "or vomiting.  Musculoskeletal:   Denies muscle cramps.  Neurological:   Denies dizziness or focal weakness.  Psychiatric/Behavior: Normal mental status.  Hematology/Lymph:  Denies bleeding problem or easy bruising/bleeding.  Skin:    Denies rash or suspicious lesions.    Examination:    Vital Signs:    Vitals:    04/05/23 0903   BP: 120/81   Pulse: 60   Weight: 89.9 kg (198 lb 3.2 oz)   Height: 5' 1" (1.549 m)       Body mass index is 37.45 kg/m².    Constitution:   Well-developed, well nourished patient in no acute distress.  Neurological:   Alert and oriented x 3 and cooperative to examination.     Psychiatric/Behavior: Normal mental status.  Respiratory:   No shortness of breath.  Eyes:    Extraoccular muscles intact  Skin:    No scars, rash or suspicious lesions.    MSK:   Right wrist:  No open wounds.  No masses.  No rashes.  No Tenderness to palpation at the thumb CMC joint.  Full active and passive range of motion of the wrist.  She is neurovascularly intact.  Previous carpal tunnel and cubital tunnel scars are well healed.  Some diminished sensation in the median nerve distribution compared to the contralateral side.  Radial pulses 2+ the hand is warm well perfused    Imaging:   X-ray of the right wrist shows no fracture or dislocation  MRI of the right wrist without contrast shows some degenerative change at the base of the thumb CMC joint.  There is no obvious ganglion cyst in the volar carpal region.  There is sign of a flattened median nerve suggestive carpal tunnel syndrome     Assessment:  Right thumb CMC osteoarthritis, right recalcitrant carpal tunnel syndrome    Plan:  We can continue conservative treatment.  She can continue wearing the braces as long as his gives her symptomatic relief for carpal tunnel syndrome.  I am also happy to inject her thumb CMC when her pain returns however this is not indicated at this time since she has no pain.  I can see her back as needed    Follow Up:  As " needed  Xray at next visit:  None

## 2023-05-04 ENCOUNTER — OFFICE VISIT (OUTPATIENT)
Dept: ORTHOPEDICS | Facility: CLINIC | Age: 48
End: 2023-05-04
Payer: MEDICARE

## 2023-05-04 ENCOUNTER — TELEPHONE (OUTPATIENT)
Dept: FAMILY MEDICINE | Facility: CLINIC | Age: 48
End: 2023-05-04
Payer: MEDICAID

## 2023-05-04 VITALS
OXYGEN SATURATION: 92 % | DIASTOLIC BLOOD PRESSURE: 90 MMHG | HEART RATE: 75 BPM | SYSTOLIC BLOOD PRESSURE: 137 MMHG | BODY MASS INDEX: 35.41 KG/M2 | TEMPERATURE: 98 F | WEIGHT: 187.38 LBS

## 2023-05-04 DIAGNOSIS — M17.11 PRIMARY OSTEOARTHRITIS OF RIGHT KNEE: Primary | ICD-10-CM

## 2023-05-04 PROCEDURE — 20610 LARGE JOINT ASPIRATION/INJECTION: R KNEE: ICD-10-PCS | Mod: RT,,, | Performed by: ORTHOPAEDIC SURGERY

## 2023-05-04 PROCEDURE — 99499 UNLISTED E&M SERVICE: CPT | Mod: ,,, | Performed by: ORTHOPAEDIC SURGERY

## 2023-05-04 PROCEDURE — 20610 DRAIN/INJ JOINT/BURSA W/O US: CPT | Mod: RT,,, | Performed by: ORTHOPAEDIC SURGERY

## 2023-05-04 PROCEDURE — 99499 NO LOS: ICD-10-PCS | Mod: ,,, | Performed by: ORTHOPAEDIC SURGERY

## 2023-05-04 RX ORDER — LIDOCAINE HYDROCHLORIDE 20 MG/ML
3 INJECTION, SOLUTION INFILTRATION; PERINEURAL
Status: DISCONTINUED | OUTPATIENT
Start: 2023-05-04 | End: 2023-05-04 | Stop reason: HOSPADM

## 2023-05-04 RX ORDER — CLINDAMYCIN PHOSPHATE 10 MG/ML
SOLUTION TOPICAL
COMMUNITY
Start: 2023-05-01

## 2023-05-04 RX ADMIN — LIDOCAINE HYDROCHLORIDE 3 MG: 20 INJECTION, SOLUTION INFILTRATION; PERINEURAL at 08:05

## 2023-05-04 NOTE — PROCEDURES
Large Joint Aspiration/Injection: R knee    Date/Time: 5/4/2023 8:45 AM  Performed by: Phil Collins MD  Authorized by: Phil Collins MD     Consent Done?:  Yes (Verbal)  Indications:  Pain  Site marked: the procedure site was marked    Timeout: prior to procedure the correct patient, procedure, and site was verified    Prep: patient was prepped and draped in usual sterile fashion      Local anesthesia used?: Yes    Local anesthetic:  Lidocaine 1% without epinephrine    Details:  Needle Size:  25 G  Approach:  Superior  Location:  Knee  Site:  R knee  Medications:  48 mg hylan g-f 20 48 mg/6 mL; 3 mg LIDOcaine HCL 20 mg/ml (2%) 20 mg/mL (2 %)  Patient tolerance:  Patient tolerated the procedure well with no immediate complications

## 2023-05-04 NOTE — PROGRESS NOTES
[facility]  Orthopedic Clinic Note      Chief Complaint:   Chief Complaint   Patient presents with    Right Knee - Pain     1o wk f/u Follow up visit for Synvisc injections. Last injection 10/25/22. Patient states no improvement since last injection. has been taking Ibuprofen for pain. Pain is a 3/10.        Referring Physician: Phil Collins MD      History of Present Illness:    This is a patient last seen in the past for an Achilles tendon rupture that had wound healing issues and had a debridement but finally healed from the debridement and Achilles rupture. She comes in today for Right knee pain that has been experiencing for the last year. She has had injections in her right knee previously. She states it was somewhat helpful.  9/16/2021 patient presents for follow-up on right knee pain. She received a steroid injection at her last visit on 7/27/2021. She reports that the injection did help, but her symptoms were only relieved for about 1 week. Her symptoms have returned to their prior severity.  9/30/2021 Pt presents for right knee Synvisc injection today. She is ready to proceed with right knee Synvisc injection. Her right knee symptoms are unchanged since her prior visit.  She also complains of left ankle pain x1 week. She reports that she got out of bed and felt some immediate pain in her left ankle. This was associated with some swelling. She has a history of a prior right Achilles tendon repair. She reports no impact to her range of motion, just some tenderness and pain with weightbearing on the left lower extremity.  11/30/2021 Patient presents for 8 week follow up on right knee Synvisc injection. She reports improvement in her symptoms since the injection, with only intermittent pain that typically occurs after she has been more active than normal. She continues to apply her topical analgesic and take Ibuprofen as needed for pain. She is still participating in PT for her left ankle pain and  reports improvement in her symptoms.  2022 patient presents for right knee Synvisc injection today. Her symptoms are unchanged since her prior visit.  10/11/2022 patient presents for re-evaluation of right knee pain.  She received a right knee viscosupplementation injection her prior visit.  She reports that she did extremely well following the viscosupplementation injection.  Her symptoms are slowly returning.  She states that today her pain is a 2/10 in severity.  10/25/2022 this patient comes in today for right knee Synvisc injection.  2023 this patient had a Synvisc injection and states that it was helpful.  This was done approximately 4 months ago.  2023 patient presents for right knee viscosupplementation injection today.  She reports that these injections are helpful until they begin to wear off 4-5 months after the injection.  She continues to take ibuprofen 800 mg as needed for pain.  She continues her weight loss journey.      Past Medical History:   Diagnosis Date    Arthritis     Carpal tunnel syndrome     Gastritis     Hypertension     Respiratory distress        Past Surgical History:   Procedure Laterality Date     SECTION      CHOLECYSTECTOMY      gastric sleeve  2022    HAND SURGERY      LAPAROSCOPIC CHOLECYSTECTOMY  10/13/2022    NERVE REPAIR      RECTAL SURGERY      REPAIR OF LIGAMENT OF ANKLE      TONSILLECTOMY      VAGINA SURGERY      vaginal fistula    WISDOM TOOTH EXTRACTION         Current Outpatient Medications   Medication Sig    calcium-vitamin D3 (OS-FIGUEROA 500 + D3) 500 mg-5 mcg (200 unit) per tablet Take 1 tablet by mouth.    cetirizine (ZYRTEC) 10 MG tablet Take 10 mg by mouth daily as needed.    clindamycin (CLEOCIN T) 1 % Swab SMARTSI Topical Every Morning    clotrimazole (LOTRIMIN) 1 % cream Apply topically 2 (two) times daily.    dextroamphetamine-amphetamine (ADDERALL XR) 30 MG 24 hr capsule     diclofenac sodium (VOLTAREN) 1 % Gel APPLY TOPICALLY TO  THE AFFECTED AREA FOUR TIMES DAILY AS NEEDED FOR PAIN    fluticasone propionate (FLONASE) 50 mcg/actuation nasal spray prn    hyoscyamine (LEVSIN) 0.125 mg/5 mL Elix Take 0.125 mg by mouth.    ibuprofen (ADVIL,MOTRIN) 800 MG tablet Take 1 tablet (800 mg total) by mouth 3 (three) times daily as needed for Pain. take with food    LINZESS 145 mcg Cap capsule Take 145 mcg by mouth once daily.    MV-MN-IRON FUM-FA-OMEGA3,6,9#3 ORAL Take 1 tablet by mouth once daily.    omeprazole (PRILOSEC) 20 MG capsule Take 20 mg by mouth once daily.    ondansetron (ZOFRAN-ODT) 4 MG TbDL Take 4 mg by mouth every 8 (eight) hours as needed.    PAXIL 30 mg tablet Take 30 mg by mouth every morning.    sucralfate (CARAFATE) 1 gram tablet Take 1 g by mouth 4 (four) times daily.    traZODone (DESYREL) 50 MG tablet Take  mg by mouth nightly.    aluminum & magnesium hydroxide-simethicone (MYLANTA MAX STRENGTH) 400-400-40 mg/5 mL suspension Take 5 mLs by mouth every 6 (six) hours as needed.     No current facility-administered medications for this visit.       Review of patient's allergies indicates:   Allergen Reactions    Macrolide antibiotics     Nitrofurantoin      Other reaction(s): Palpitations  Other reaction(s): Palpitations    Nitrofurantoin monohyd/m-cryst Palpitations       Family History   Problem Relation Age of Onset    COPD Mother     Arthritis Mother     Lung cancer Mother     Hypertension Mother     Hyperlipidemia Father     Rheum arthritis Father     Rheum arthritis Sister        Social History     Socioeconomic History    Marital status: Unknown   Tobacco Use    Smoking status: Never    Smokeless tobacco: Never   Substance and Sexual Activity    Alcohol use: Not Currently    Drug use: Never    Sexual activity: Yes     Partners: Male   Social History Narrative    ** Merged History Encounter **          Social Determinants of Health     Physical Activity: Insufficiently Active    Days of Exercise per Week: 3 days     Minutes of Exercise per Session: 30 min           Review of Systems:  All review of systems negative except for those stated in the HPI.    Examination:    Vital Signs:    Vitals:    05/04/23 0852 05/04/23 0855   BP: (!) 137/90    Pulse: 75    Temp: 98.1 °F (36.7 °C)    SpO2: (!) 92%    Weight: 85 kg (187 lb 6.4 oz)    PainSc:    3         Body mass index is 35.41 kg/m².    Physical Examination:  General: Well-developed, well-nourished.  Neuro: Alert and oriented x 3.  Psych: Normal mood and affect.  Right knee Exam:  No obvious deformity. Range of motion from 0-130 degrees. Increased subluxation of the kneecap medially and laterally greater than 1 cm. Negative patella tendon tenderness. Negative J sign. Negative Lachman and anterior drawer test. Negative posterior drawer test. Negative varus and valgus stress test. Negative medial joint line tenderness. Negative lateral joint line tenderness. 4/5 strength and normal skin appearance. Sensibility normal.      Imaging: Prior four views of the right knee demonstrate degenerative changes with joint space narrowing, particularly in the medial aspect of the tibial femoral compartment and the patellofemoral compartment of the knee.        Assessment: Primary osteoarthritis of right knee  -     Large Joint Aspiration/Injection: R knee        Plan:  She will continue her previously prescribed ibuprofen as needed for pain.  She will continue her weight loss journey as she has been extremely successful thus far.  She will return to clinic in approximately 6 months for repeat right knee viscosupplementation injection.  She verbalized understanding of the plan of care with no further questions.    Phil Collins MD personally performed the services described in this documentation, including but not limited to patient's history, physical examination, and assessment and plan of care. All medical record entries made by JOSE Noble were performed at his direction and in his  presence. The medical record was reviewed and is accurate and complete.    Large Joint Aspiration/Injection: R knee    Date/Time: 5/4/2023 8:45 AM  Performed by: Phil Collins MD  Authorized by: Phil Collins MD     Consent Done?:  Yes (Verbal)  Indications:  Pain  Site marked: the procedure site was marked    Timeout: prior to procedure the correct patient, procedure, and site was verified    Prep: patient was prepped and draped in usual sterile fashion      Local anesthesia used?: Yes    Local anesthetic:  Lidocaine 1% without epinephrine    Details:  Needle Size:  25 G  Approach:  Superior  Location:  Knee  Site:  R knee  Medications:  48 mg hylan g-f 20 48 mg/6 mL; 3 mg LIDOcaine HCL 20 mg/ml (2%) 20 mg/mL (2 %)  Patient tolerance:  Patient tolerated the procedure well with no immediate complications    Follow up in about 6 months (around 11/4/2023) for Repeat right knee viscosupplementation injection.      DISCLAIMER: This note may have been dictated using voice recognition software and may contain grammatical errors.     NOTE: Consult report sent to referring provider via [employer] EMR.

## 2023-07-25 ENCOUNTER — PATIENT MESSAGE (OUTPATIENT)
Dept: ADMINISTRATIVE | Facility: HOSPITAL | Age: 48
End: 2023-07-25
Payer: MEDICARE

## 2023-07-26 ENCOUNTER — OFFICE VISIT (OUTPATIENT)
Dept: INTERNAL MEDICINE | Facility: CLINIC | Age: 48
End: 2023-07-26
Payer: MEDICARE

## 2023-07-26 VITALS
DIASTOLIC BLOOD PRESSURE: 84 MMHG | SYSTOLIC BLOOD PRESSURE: 137 MMHG | WEIGHT: 193.63 LBS | HEIGHT: 61 IN | BODY MASS INDEX: 36.56 KG/M2 | TEMPERATURE: 98 F | HEART RATE: 74 BPM | RESPIRATION RATE: 20 BRPM

## 2023-07-26 DIAGNOSIS — Z80.0 FAMILY HISTORY OF COLORECTAL CANCER: ICD-10-CM

## 2023-07-26 DIAGNOSIS — N30.01 ACUTE CYSTITIS WITH HEMATURIA: Primary | ICD-10-CM

## 2023-07-26 DIAGNOSIS — D50.9 IRON DEFICIENCY ANEMIA, UNSPECIFIED IRON DEFICIENCY ANEMIA TYPE: ICD-10-CM

## 2023-07-26 DIAGNOSIS — Z98.84 S/P BARIATRIC SURGERY: ICD-10-CM

## 2023-07-26 DIAGNOSIS — R15.9 FULL INCONTINENCE OF FECES: ICD-10-CM

## 2023-07-26 DIAGNOSIS — Z23 IMMUNIZATION DUE: ICD-10-CM

## 2023-07-26 DIAGNOSIS — I10 PRIMARY HYPERTENSION: ICD-10-CM

## 2023-07-26 DIAGNOSIS — F41.8 MIXED ANXIETY DEPRESSIVE DISORDER: ICD-10-CM

## 2023-07-26 DIAGNOSIS — Z12.31 VISIT FOR SCREENING MAMMOGRAM: ICD-10-CM

## 2023-07-26 PROBLEM — Z90.49 S/P LAPAROSCOPIC CHOLECYSTECTOMY: Status: RESOLVED | Noted: 2022-10-27 | Resolved: 2023-07-26

## 2023-07-26 LAB
FERRITIN SERPL-MCNC: 21.6 NG/ML (ref 4.63–204)
IRON SATN MFR SERPL: 20 % (ref 20–50)
IRON SERPL-MCNC: 52 UG/DL (ref 50–170)
TIBC SERPL-MCNC: 210 UG/DL (ref 70–310)
TIBC SERPL-MCNC: 262 UG/DL (ref 250–450)
TRANSFERRIN SERPL-MCNC: 220 MG/DL (ref 180–382)

## 2023-07-26 PROCEDURE — 3079F PR MOST RECENT DIASTOLIC BLOOD PRESSURE 80-89 MM HG: ICD-10-PCS | Mod: CPTII,,, | Performed by: NURSE PRACTITIONER

## 2023-07-26 PROCEDURE — 3008F PR BODY MASS INDEX (BMI) DOCUMENTED: ICD-10-PCS | Mod: CPTII,,, | Performed by: NURSE PRACTITIONER

## 2023-07-26 PROCEDURE — 99214 OFFICE O/P EST MOD 30 MIN: CPT | Mod: S$PBB,,, | Performed by: NURSE PRACTITIONER

## 2023-07-26 PROCEDURE — 3075F PR MOST RECENT SYSTOLIC BLOOD PRESS GE 130-139MM HG: ICD-10-PCS | Mod: CPTII,,, | Performed by: NURSE PRACTITIONER

## 2023-07-26 PROCEDURE — 99214 PR OFFICE/OUTPT VISIT, EST, LEVL IV, 30-39 MIN: ICD-10-PCS | Mod: S$PBB,,, | Performed by: NURSE PRACTITIONER

## 2023-07-26 PROCEDURE — 1159F PR MEDICATION LIST DOCUMENTED IN MEDICAL RECORD: ICD-10-PCS | Mod: CPTII,,, | Performed by: NURSE PRACTITIONER

## 2023-07-26 PROCEDURE — 1159F MED LIST DOCD IN RCRD: CPT | Mod: CPTII,,, | Performed by: NURSE PRACTITIONER

## 2023-07-26 PROCEDURE — 3008F BODY MASS INDEX DOCD: CPT | Mod: CPTII,,, | Performed by: NURSE PRACTITIONER

## 2023-07-26 PROCEDURE — 99215 OFFICE O/P EST HI 40 MIN: CPT | Mod: PBBFAC | Performed by: NURSE PRACTITIONER

## 2023-07-26 PROCEDURE — 3075F SYST BP GE 130 - 139MM HG: CPT | Mod: CPTII,,, | Performed by: NURSE PRACTITIONER

## 2023-07-26 PROCEDURE — 1160F RVW MEDS BY RX/DR IN RCRD: CPT | Mod: CPTII,,, | Performed by: NURSE PRACTITIONER

## 2023-07-26 PROCEDURE — 3079F DIAST BP 80-89 MM HG: CPT | Mod: CPTII,,, | Performed by: NURSE PRACTITIONER

## 2023-07-26 PROCEDURE — 82728 ASSAY OF FERRITIN: CPT | Performed by: NURSE PRACTITIONER

## 2023-07-26 PROCEDURE — 83550 IRON BINDING TEST: CPT | Performed by: NURSE PRACTITIONER

## 2023-07-26 PROCEDURE — 1160F PR REVIEW ALL MEDS BY PRESCRIBER/CLIN PHARMACIST DOCUMENTED: ICD-10-PCS | Mod: CPTII,,, | Performed by: NURSE PRACTITIONER

## 2023-07-26 RX ORDER — CIPROFLOXACIN 500 MG/1
500 TABLET ORAL EVERY 12 HOURS
Qty: 6 TABLET | Refills: 0 | Status: SHIPPED | OUTPATIENT
Start: 2023-07-26 | End: 2023-07-26

## 2023-07-26 RX ORDER — FLUCONAZOLE 150 MG/1
TABLET ORAL
COMMUNITY
Start: 2023-06-30 | End: 2023-07-26

## 2023-07-26 RX ORDER — IBUPROFEN 800 MG/1
1 TABLET ORAL 3 TIMES DAILY PRN
COMMUNITY
Start: 2023-02-28 | End: 2023-07-26 | Stop reason: SDUPTHER

## 2023-07-26 RX ORDER — SULFAMETHOXAZOLE AND TRIMETHOPRIM 800; 160 MG/1; MG/1
1 TABLET ORAL 2 TIMES DAILY
Qty: 14 TABLET | Refills: 0 | Status: SHIPPED | OUTPATIENT
Start: 2023-07-26 | End: 2023-07-27 | Stop reason: ALTCHOICE

## 2023-07-26 NOTE — PROGRESS NOTES
L Walter, NP   [facility]  [address] [facility] [address]      PATIENT NAME: Beverly Lee  : 1975  DATE: 23  MRN: [id number]        Reason for Visit / Chief Complaint: Results       History of Present Illness / Problem Focused Workflow     Beverly Lee presents to the clinic with Results     46 yo AAF for annual follow up visit with labs.  She reports overall feeling well.  She continues to lose weight.  Weight today 193 lb and prior weight 2022 to 233 lb.  She continues to exercise regularly, every other day.  She continues to follow diet modifications.  She continues to see surgeon with recent visit May 2023.  She recalls last colonoscopy in  at [facility].  Mammogram due March next year.  Labs completed today.  Anemia noted.  Patient admits she is on her menstrual cycle.  UA with bacteria and patient has noticed lower abdominal pressure.  She reports about proximally 2 weeks ago she did wake up with fecal leakage.  She denies any abdominal pain, nausea, vomiting, diarrhea, chest pain, shortness the breath, headache, dizziness, sinus, allergies, cough, choking.  No other concerns today.    Other providers:   Dr. Saranya Collins       Review of Systems     Review of Systems   Constitutional: Negative.    HENT: Negative.     Eyes: Negative.    Respiratory: Negative.     Cardiovascular: Negative.    Gastrointestinal: Negative.    Endocrine: Negative.    Genitourinary:  Positive for dysuria.   Musculoskeletal: Negative.    Skin: Negative.    Allergic/Immunologic: Negative.    Neurological: Negative.    Hematological: Negative.    Psychiatric/Behavioral: Negative.       Medical / Social / Family History     ----------------------------  Arthritis  Carpal tunnel syndrome  Gastritis  Hypertension  Respiratory distress     Past Surgical History:   Procedure Laterality Date      SECTION      CHOLECYSTECTOMY      gastric sleeve  2022    HAND SURGERY      LAPAROSCOPIC CHOLECYSTECTOMY  10/13/2022    NERVE REPAIR      RECTAL SURGERY      REPAIR OF LIGAMENT OF ANKLE      TONSILLECTOMY      VAGINA SURGERY      vaginal fistula    WISDOM TOOTH EXTRACTION         Social History     Socioeconomic History    Marital status: Unknown    Number of children: 3   Tobacco Use    Smoking status: Never    Smokeless tobacco: Never   Substance and Sexual Activity    Alcohol use: Not Currently    Drug use: Never    Sexual activity: Yes     Partners: Male   Social History Narrative    ** Merged History Encounter **          Social Determinants of Health     Physical Activity: Sufficiently Active    Days of Exercise per Week: 5 days    Minutes of Exercise per Session: 30 min        Family History   Problem Relation Age of Onset    COPD Mother     Arthritis Mother     Lung cancer Mother     Hypertension Mother     Hyperlipidemia Father     Rheum arthritis Father     Rheum arthritis Sister         Medications and Allergies     Medications  Current Outpatient Medications   Medication Instructions    aluminum & magnesium hydroxide-simethicone (MYLANTA MAX STRENGTH) 400-400-40 mg/5 mL suspension 5 mLs, Oral, Every 6 hours PRN    calcium-vitamin D3 (OS-FIGUEROA 500 + D3) 500 mg-5 mcg (200 unit) per tablet 1 tablet, Oral    cetirizine (ZYRTEC) 10 mg, Oral, Daily PRN    ciprofloxacin HCl (CIPRO) 500 mg, Oral, Every 12 hours    clindamycin (CLEOCIN T) 1 % Swab SMARTSI Topical Every Morning    clotrimazole (LOTRIMIN) 1 % cream Topical (Top), 2 times daily    dextroamphetamine-amphetamine (ADDERALL XR) 30 MG 24 hr capsule No dose, route, or frequency recorded.    diclofenac sodium (VOLTAREN) 1 % Gel APPLY TOPICALLY TO THE AFFECTED AREA FOUR TIMES DAILY AS NEEDED FOR PAIN    fluticasone propionate (FLONASE) 50 mcg/actuation nasal spray prn    hyoscyamine (LEVSIN) 0.125 mg, Oral    ibuprofen (ADVIL,MOTRIN) 800 mg, Oral, 3  "times daily PRN, take with food    LINZESS 145 mcg, Oral, Daily    MV-MN-IRON FUM-FA-OMEGA3,6,9#3 ORAL 1 tablet, Oral, Daily    omeprazole (PRILOSEC) 20 mg, Oral, Daily    ondansetron (ZOFRAN-ODT) 4 mg, Oral, Every 8 hours PRN    PaxiL 30 mg, Oral, Every morning    sucralfate (CARAFATE) 1 g, Oral, 4 times daily    traZODone (DESYREL)  mg, Oral, Nightly       Allergies  Review of patient's allergies indicates:   Allergen Reactions    Macrolide antibiotics     Nitrofurantoin      Other reaction(s): Palpitations  Other reaction(s): Palpitations    Nitrofurantoin monohyd/m-cryst Palpitations       Physical Examination     Visit Vitals  /84 (BP Location: Left arm, Patient Position: Sitting, BP Method: Large (Automatic))   Pulse 74   Temp 98.4 °F (36.9 °C) (Oral)   Resp 20   Ht 5' 1" (1.549 m)   Wt 87.8 kg (193 lb 9.6 oz)   LMP 07/24/2023   BMI 36.58 kg/m²       Physical Exam  Vitals and nursing note reviewed.   Constitutional:       Appearance: Normal appearance. She is not ill-appearing.   HENT:      Head: Normocephalic.      Right Ear: Tympanic membrane normal.      Left Ear: Tympanic membrane normal.      Nose: Nose normal.      Mouth/Throat:      Mouth: Mucous membranes are moist.   Eyes:      Extraocular Movements: Extraocular movements intact.      Conjunctiva/sclera: Conjunctivae normal.      Pupils: Pupils are equal, round, and reactive to light.   Neck:      Thyroid: No thyroid mass, thyromegaly or thyroid tenderness.      Vascular: No carotid bruit.   Cardiovascular:      Rate and Rhythm: Normal rate and regular rhythm.      Pulses: Normal pulses.   Pulmonary:      Effort: Pulmonary effort is normal. No respiratory distress.      Breath sounds: Normal breath sounds.   Abdominal:      General: Bowel sounds are normal. There is no distension.      Palpations: Abdomen is soft. There is no mass.      Tenderness: There is no abdominal tenderness. There is no guarding or rebound.      Hernia: No hernia is " present.   Musculoskeletal:         General: Normal range of motion.      Cervical back: Normal range of motion and neck supple. No tenderness.      Right lower leg: No edema.      Left lower leg: No edema.   Lymphadenopathy:      Cervical: No cervical adenopathy.   Skin:     General: Skin is warm and dry.      Capillary Refill: Capillary refill takes less than 2 seconds.   Neurological:      Mental Status: She is alert and oriented to person, place, and time. Mental status is at baseline.      Motor: No weakness.   Psychiatric:         Mood and Affect: Mood normal.         Behavior: Behavior normal.         Thought Content: Thought content normal.         Judgment: Judgment normal.         Results     Lab Results   Component Value Date    WBC 7.65 07/26/2023    RBC 3.81 (L) 07/26/2023    HGB 11.8 (L) 07/26/2023    HCT 36.3 (L) 07/26/2023    MCV 95.3 (H) 07/26/2023    MCH 31.0 07/26/2023    MCHC 32.5 (L) 07/26/2023    RDW 12.2 07/26/2023     07/26/2023    MPV 10.7 (H) 07/26/2023    EOS 0.2 06/26/2023    BASO 0.0 06/26/2023    EOSINOPHIL 3 06/26/2023     Sodium   Date Value Ref Range Status   05/27/2022 140 136 - 145 mmol/L Final     Sodium Level   Date Value Ref Range Status   07/26/2023 145 136 - 145 mmol/L Final     Potassium   Date Value Ref Range Status   05/27/2022 3.8 3.5 - 5.1 mmol/L Final     Potassium Level   Date Value Ref Range Status   07/26/2023 4.2 3.5 - 5.1 mmol/L Final     Chloride   Date Value Ref Range Status   05/27/2022 108 100 - 109 mmol/L Final     Carbon Dioxide   Date Value Ref Range Status   07/26/2023 29 22 - 29 mmol/L Final   05/27/2022 24 22 - 33 mmol/L Final     Blood Urea Nitrogen   Date Value Ref Range Status   07/26/2023 9.4 7.0 - 18.7 mg/dL Final   05/27/2022 8 5 - 25 mg/dL Final     Creatinine   Date Value Ref Range Status   07/26/2023 0.89 0.55 - 1.02 mg/dL Final   05/27/2022 0.69 0.57 - 1.25 mg/dL Final     Calcium   Date Value Ref Range Status   05/27/2022 8.3 (L) 8.8 -  10.6 mg/dL Final     Calcium Level Total   Date Value Ref Range Status   07/26/2023 9.1 8.4 - 10.2 mg/dL Final     Albumin Level   Date Value Ref Range Status   07/26/2023 3.5 3.5 - 5.0 g/dL Final     Bilirubin Total   Date Value Ref Range Status   07/26/2023 0.3 <=1.5 mg/dL Final     Alkaline Phosphatase   Date Value Ref Range Status   07/26/2023 73 40 - 150 unit/L Final     Aspartate Aminotransferase   Date Value Ref Range Status   07/26/2023 14 5 - 34 unit/L Final     Alanine Aminotransferase   Date Value Ref Range Status   07/26/2023 8 0 - 55 unit/L Final     Anion Gap   Date Value Ref Range Status   05/27/2022 8 8 - 16 mmol/L Final     eGFR    Date Value Ref Range Status   05/27/2022 110 mL/min/1.73mSq Final     Comment:     In accordance with NKF-ASN Task Force recommendation, calculation based on the Chronic Kidney Disease Epidemiology Collaboration (CKD-EPI) equation without adjustment for race. eGFR adjusted for gender and age and calculated in ml/min/1.73mSquared. eGFR cannot be calculated if patient is under 18 years of age.     Reference Range:   >= 60 ml/min/1.73mSquared.     Estimated GFR-Non    Date Value Ref Range Status   01/24/2022 >60 mL/min/1.73 m2 Final     Lab Results   Component Value Date    CHOL 174 07/26/2023     Lab Results   Component Value Date    HDL 55 07/26/2023     No results found for: LDLCALC  Lab Results   Component Value Date    TRIG 51 07/26/2023     No results found for: CHOLHDL  Lab Results   Component Value Date    TSH 2.232 07/26/2023     Lab Results   Component Value Date    PHUR 5.5 09/24/2020    PROTEINUA 1+ (A) 07/26/2023    GLUCUA Negative 09/24/2020    KETONESU Negative 09/24/2020    OCCULTUA Negative 09/24/2020    NITRITE Negative 09/24/2020    LEUKOCYTESUR 500 (A) 07/26/2023     Lab Results   Component Value Date    HGBA1C 4.8 07/26/2022    HGBA1C 5.0 01/24/2022    HGBA1C 4.9 07/26/2021     No results found for: MICROALBUR, [id number]    No results found for this or any previous visit.         Assessment       ICD-10-CM ICD-9-CM   1. Acute cystitis with hematuria  N30.01 595.0   2. Iron deficiency anemia, unspecified iron deficiency anemia type  D50.9 280.9   3. BMI 36.0-36.9,adult  Z68.36 V85.36   4. Mixed anxiety depressive disorder  F41.8 300.4   5. S/P bariatric surgery  Z98.84 V45.86   6. Immunization due  Z23 V05.9   7. Primary hypertension  I10 401.9   8. Family history of colorectal cancer  Z80.0 V16.0   9. Full incontinence of feces  R15.9 787.60       Plan       1. Iron deficiency anemia, unspecified iron deficiency anemia type  Mild anemia noted secondary to menstrual cycle.  Patient is asymptomatic.  Patient can take oral ferrous sulfate for supplementation.  - Iron and TIBC; Future  - Ferritin; Future  - Ferritin  - Iron and TIBC    2. BMI 36.0-36.9,adult  BMI 36.58, weight loss 40 lb since July 2022  Educated on increased risk of disease s/t obesity.  Educated on health benefits of at least 5 days/ week of 30 minutes moderate intensity exercise (brisk walking) and 2 or more days/ week of muscle strength activities (as tolerated).  Eat well balanced diet of fresh fruits/ vegetables, whole grains, lean meats and limit high carbohydrate foods.       3. Mixed anxiety depressive disorder  Stable. Continue medications. Notify provider for worsening s/s. Any SI/HI, report to ER/ call 911.   Keep follow up with provider.    4. S/P bariatric surgery  Patient continues to see gastric surgeon, last visit May 2023.    5. Acute cystitis with hematuria  Urine culture results pending.  Rx Bactrim b.i.d. x7 days.  We will notify patient of results if antibiotic warrants change.  Encouraged adequate hydration and wipe front to back.  Patient has chronic intermittent fecal leakage which she has been treated for.  - Urine culture; Future    6. Immunization due  Patient will get Tdap at local pharmacy.    7. Primary hypertension  /84.  Patient is  no longer on antihypertensives.    8. Family history of colorectal cancer  Patient completed colonoscopy in [place] prior to her rectal surgery in 2018.  Need copy of records.    9. Full incontinence of feces  Patient has implantable device.      Future Appointments   Date Time Provider Department Center   11/9/2023  9:00 AM Phil Collins MD [facility] [place]   11/21/2023  8:30 AM [facility] [place]   8/7/2024  9:15 AM [facility] NP [facility]         Follow up in about 54 weeks (around 8/7/2024) for wellness with fasting labs before visit.    Signature:  Lauren Watson NP  [facility]  [address] [facility] [address]    Date of encounter: 7/26/23

## 2023-07-27 ENCOUNTER — TELEPHONE (OUTPATIENT)
Dept: INTERNAL MEDICINE | Facility: CLINIC | Age: 48
End: 2023-07-27
Payer: MEDICARE

## 2023-07-27 DIAGNOSIS — B95.1 GROUP B STREPTOCOCCAL UTI: Primary | ICD-10-CM

## 2023-07-27 DIAGNOSIS — N39.0 GROUP B STREPTOCOCCAL UTI: Primary | ICD-10-CM

## 2023-07-27 RX ORDER — AMOXICILLIN AND CLAVULANATE POTASSIUM 500; 125 MG/1; MG/1
1 TABLET, FILM COATED ORAL 2 TIMES DAILY
Qty: 14 TABLET | Refills: 0 | Status: SHIPPED | OUTPATIENT
Start: 2023-07-27 | End: 2023-08-03

## 2023-07-27 NOTE — TELEPHONE ENCOUNTER
Contacted pt to inform urine culture results reviewed with group B strep. Will change antibiotic; she denies starting antibiotic therapy yet. Rx Augmentin send to pharmacy. Pt to take as directed. Encouraged to complete all of antibiotic and if symptoms worsen or any s/e r/t antibiotic pt to notify provider. She verb understanding.

## 2023-08-02 ENCOUNTER — TELEPHONE (OUTPATIENT)
Dept: INTERNAL MEDICINE | Facility: CLINIC | Age: 48
End: 2023-08-02
Payer: MEDICARE

## 2023-08-02 NOTE — TELEPHONE ENCOUNTER
Please inform patient lab results reviewed with normal iron studies.  No further testing needed at this time.  Patient to notify provider for any change in her condition.    Thank you

## 2023-09-11 ENCOUNTER — TELEPHONE (OUTPATIENT)
Dept: GYNECOLOGY | Facility: CLINIC | Age: 48
End: 2023-09-11
Payer: MEDICARE

## 2023-09-11 NOTE — TELEPHONE ENCOUNTER
----- Message from Shira Devlin sent at 9/11/2023  9:07 AM CDT -----  Regarding: vaginal leakage  Above pt called c/o vaginal leakage and wants to see Denia. Please Advise thanks

## 2023-09-13 ENCOUNTER — OFFICE VISIT (OUTPATIENT)
Dept: GYNECOLOGY | Facility: CLINIC | Age: 48
End: 2023-09-13
Payer: MEDICARE

## 2023-09-13 VITALS
BODY MASS INDEX: 34.17 KG/M2 | SYSTOLIC BLOOD PRESSURE: 147 MMHG | HEART RATE: 87 BPM | RESPIRATION RATE: 20 BRPM | TEMPERATURE: 98 F | WEIGHT: 181 LBS | HEIGHT: 61 IN | DIASTOLIC BLOOD PRESSURE: 89 MMHG | OXYGEN SATURATION: 100 %

## 2023-09-13 DIAGNOSIS — N89.8 VAGINAL IRRITATION: Primary | ICD-10-CM

## 2023-09-13 LAB
C TRACH DNA SPEC QL NAA+PROBE: NOT DETECTED
CLUE CELLS VAG QL WET PREP: ABNORMAL
N GONORRHOEA DNA SPEC QL NAA+PROBE: NOT DETECTED
SOURCE (OHS): NORMAL
T VAGINALIS VAG QL WET PREP: ABNORMAL
WBC #/AREA VAG WET PREP: ABNORMAL
YEAST SPEC QL WET PREP: ABNORMAL

## 2023-09-13 PROCEDURE — 3079F DIAST BP 80-89 MM HG: CPT | Mod: CPTII,,, | Performed by: NURSE PRACTITIONER

## 2023-09-13 PROCEDURE — 99214 OFFICE O/P EST MOD 30 MIN: CPT | Mod: PBBFAC | Performed by: NURSE PRACTITIONER

## 2023-09-13 PROCEDURE — 1159F MED LIST DOCD IN RCRD: CPT | Mod: CPTII,,, | Performed by: NURSE PRACTITIONER

## 2023-09-13 PROCEDURE — 99213 PR OFFICE/OUTPT VISIT, EST, LEVL III, 20-29 MIN: ICD-10-PCS | Mod: S$PBB,,, | Performed by: NURSE PRACTITIONER

## 2023-09-13 PROCEDURE — 3077F PR MOST RECENT SYSTOLIC BLOOD PRESSURE >= 140 MM HG: ICD-10-PCS | Mod: CPTII,,, | Performed by: NURSE PRACTITIONER

## 2023-09-13 PROCEDURE — 87591 N.GONORRHOEAE DNA AMP PROB: CPT | Performed by: NURSE PRACTITIONER

## 2023-09-13 PROCEDURE — 87210 SMEAR WET MOUNT SALINE/INK: CPT | Performed by: NURSE PRACTITIONER

## 2023-09-13 PROCEDURE — 3077F SYST BP >= 140 MM HG: CPT | Mod: CPTII,,, | Performed by: NURSE PRACTITIONER

## 2023-09-13 PROCEDURE — 3008F BODY MASS INDEX DOCD: CPT | Mod: CPTII,,, | Performed by: NURSE PRACTITIONER

## 2023-09-13 PROCEDURE — 3079F PR MOST RECENT DIASTOLIC BLOOD PRESSURE 80-89 MM HG: ICD-10-PCS | Mod: CPTII,,, | Performed by: NURSE PRACTITIONER

## 2023-09-13 PROCEDURE — 3008F PR BODY MASS INDEX (BMI) DOCUMENTED: ICD-10-PCS | Mod: CPTII,,, | Performed by: NURSE PRACTITIONER

## 2023-09-13 PROCEDURE — 1159F PR MEDICATION LIST DOCUMENTED IN MEDICAL RECORD: ICD-10-PCS | Mod: CPTII,,, | Performed by: NURSE PRACTITIONER

## 2023-09-13 PROCEDURE — 99213 OFFICE O/P EST LOW 20 MIN: CPT | Mod: S$PBB,,, | Performed by: NURSE PRACTITIONER

## 2023-09-13 RX ORDER — NYSTATIN AND TRIAMCINOLONE ACETONIDE 100000; 1 [USP'U]/G; MG/G
CREAM TOPICAL 2 TIMES DAILY PRN
COMMUNITY
Start: 2023-08-03

## 2023-09-13 NOTE — PROGRESS NOTES
"  Subjective:       Patient ID: Beverly Lee is a 48 y.o. female.    Chief Complaint:  Vaginal Discharge      History of Present Illness  The patient is  here for vaginal irritation. Pt last saw GYN and uro/gyn for fecal incontinence s/p anal sphincteroplasty and laparoscopic bilateral salpingectomy for undesired fertility on 18, with complication of wound dehiscence. Pt takes Linzess for constipation and when she takes it, it causes fecal leakage. Pt does admit new partner, she states he is larger and is wondering if this is also causing irritation. Does not use lubrication with intercourse.    GYN & OB History  Patient's last menstrual period was 2023 (exact date).   Date of Last Pap: 11/15/2021    Review of patient's allergies indicates:   Allergen Reactions    Macrolide antibiotics     Nitrofurantoin      Other reaction(s): Palpitations  Other reaction(s): Palpitations    Nitrofurantoin monohyd/m-cryst Palpitations     Past Medical History:   Diagnosis Date    Abnormal Pap smear of cervix     Arthritis     Carpal tunnel syndrome     Gastritis     Hypertension     Respiratory distress      OB History    Para Term  AB Living   7 3     4 3   SAB IAB Ectopic Multiple Live Births   4       3      # Outcome Date GA Lbr Gadiel/2nd Weight Sex Delivery Anes PTL Lv   7 SAB            6 SAB            5 SAB            4 SAB            3 Para      Vag-Spont   KEITH   2 Para      CS-Unspec   KEITH   1 Para      CS-Unspec   KEITH        Review of Systems  Review of Systems    Negative except for pertinent findings for positives per HPI     Objective:    Physical Exam    BP (!) 147/89 (BP Location: Right arm, Patient Position: Sitting, BP Method: Medium (Automatic))   Pulse 87   Temp 98.1 °F (36.7 °C) (Oral)   Resp 20   Ht 5' 1" (1.549 m)   Wt 82.1 kg (181 lb)   LMP 2023 (Exact Date)   SpO2 100%   BMI 34.20 kg/m²   GENERAL: Well-developed female in no acute distress.  SKIN: Normal to " inspection, warm and intact.  VULVA: General appearance normal; external genitalia with no lesions or erythema.  VAGINA: Mucosa normal, pink, thin white discharge, no lesions.  CERVIX: Pink with mild erythema   BIMANUAL EXAM: reveals a 12 week-sized uterus. The uterus is non tender. Tien adnexa reveal no tenderness.  PSYCHIATRIC: Patient is oriented to person, place, and time. Mood and affect are normal.    Assessment:       1. Vaginal irritation       Plan:   Beverly was seen today for vaginal discharge.    Diagnoses and all orders for this visit:    Vaginal irritation  -     Chlamydia/GC, PCR  -     Wet Prep, Genital    Wet prep and g/c  Probiotics  Lubrication with all sexual encounters.  Follow up for annual exam.

## 2023-09-14 ENCOUNTER — TELEPHONE (OUTPATIENT)
Dept: GYNECOLOGY | Facility: CLINIC | Age: 48
End: 2023-09-14
Payer: MEDICARE

## 2023-09-14 DIAGNOSIS — B96.89 BV (BACTERIAL VAGINOSIS): Primary | ICD-10-CM

## 2023-09-14 DIAGNOSIS — A59.9 TRICHOMONAS INFECTION: ICD-10-CM

## 2023-09-14 DIAGNOSIS — N76.0 BV (BACTERIAL VAGINOSIS): Primary | ICD-10-CM

## 2023-09-14 RX ORDER — METRONIDAZOLE 500 MG/1
500 TABLET ORAL 2 TIMES DAILY
Qty: 14 TABLET | Refills: 0 | Status: SHIPPED | OUTPATIENT
Start: 2023-09-14 | End: 2023-09-21

## 2023-09-14 NOTE — TELEPHONE ENCOUNTER
Swab showed clue cells indicating bacterial vaginosis. Not an STD but an infection in the vaginal area when pH is disrupted. Use unscented soap and no scented products. More showers than baths. No douching.       Swab shows trichomonas which is an STD and needs to be treated. Pt and partner need treatment and refrain from intercourse for at least 7 days after they are both treated. If partner does not get treated and they engage in unprotected intercourse, will pass trichomonas on again. Encourage safe sex practices. Rx sent to pharmacy for Flagyl. No alcohol for 24 hours after completion of medication.

## 2023-09-21 DIAGNOSIS — A59.9 TRICHOMONAS INFECTION: Primary | ICD-10-CM

## 2023-09-21 RX ORDER — METRONIDAZOLE 500 MG/1
500 TABLET ORAL 2 TIMES DAILY
Qty: 14 TABLET | Refills: 0 | Status: SHIPPED | OUTPATIENT
Start: 2023-09-21 | End: 2023-09-28

## 2023-09-21 NOTE — TELEPHONE ENCOUNTER
Spoke to patient because she called and stated the medication given is not working. Patient did not answer the phone after multiple attempts. Patient informed of positive results for trichomonas. Informed that her and partner needed to be treated. Patient stated she was unaware and had intercourse multiple times with partner. Patient informed that medication will be sent to the pharmacy to retreat the infection and not to share with partner. Patient verbalized a clear understanding and had no concerns at this time.

## 2023-10-07 DIAGNOSIS — M17.11 PRIMARY OSTEOARTHRITIS OF RIGHT KNEE: ICD-10-CM

## 2023-10-09 RX ORDER — IBUPROFEN 800 MG/1
TABLET ORAL
Qty: 90 TABLET | Refills: 2 | Status: SHIPPED | OUTPATIENT
Start: 2023-10-09

## 2023-11-09 ENCOUNTER — OFFICE VISIT (OUTPATIENT)
Dept: ORTHOPEDICS | Facility: CLINIC | Age: 48
End: 2023-11-09
Payer: MEDICARE

## 2023-11-09 VITALS
DIASTOLIC BLOOD PRESSURE: 80 MMHG | HEART RATE: 108 BPM | WEIGHT: 181.38 LBS | HEIGHT: 61 IN | SYSTOLIC BLOOD PRESSURE: 129 MMHG | BODY MASS INDEX: 34.24 KG/M2

## 2023-11-09 DIAGNOSIS — M17.11 PRIMARY OSTEOARTHRITIS OF RIGHT KNEE: Primary | ICD-10-CM

## 2023-11-09 PROCEDURE — 3079F PR MOST RECENT DIASTOLIC BLOOD PRESSURE 80-89 MM HG: ICD-10-PCS | Mod: CPTII,,, | Performed by: ORTHOPAEDIC SURGERY

## 2023-11-09 PROCEDURE — 20610 DRAIN/INJ JOINT/BURSA W/O US: CPT | Mod: RT,,, | Performed by: ORTHOPAEDIC SURGERY

## 2023-11-09 PROCEDURE — 1159F MED LIST DOCD IN RCRD: CPT | Mod: CPTII,,, | Performed by: ORTHOPAEDIC SURGERY

## 2023-11-09 PROCEDURE — 99214 PR OFFICE/OUTPT VISIT, EST, LEVL IV, 30-39 MIN: ICD-10-PCS | Mod: 25,,, | Performed by: ORTHOPAEDIC SURGERY

## 2023-11-09 PROCEDURE — 99214 OFFICE O/P EST MOD 30 MIN: CPT | Mod: 25,,, | Performed by: ORTHOPAEDIC SURGERY

## 2023-11-09 PROCEDURE — 3008F PR BODY MASS INDEX (BMI) DOCUMENTED: ICD-10-PCS | Mod: CPTII,,, | Performed by: ORTHOPAEDIC SURGERY

## 2023-11-09 PROCEDURE — 3008F BODY MASS INDEX DOCD: CPT | Mod: CPTII,,, | Performed by: ORTHOPAEDIC SURGERY

## 2023-11-09 PROCEDURE — 3074F PR MOST RECENT SYSTOLIC BLOOD PRESSURE < 130 MM HG: ICD-10-PCS | Mod: CPTII,,, | Performed by: ORTHOPAEDIC SURGERY

## 2023-11-09 PROCEDURE — 1159F PR MEDICATION LIST DOCUMENTED IN MEDICAL RECORD: ICD-10-PCS | Mod: CPTII,,, | Performed by: ORTHOPAEDIC SURGERY

## 2023-11-09 PROCEDURE — 20610 PR DRAIN/INJECT LARGE JOINT/BURSA: ICD-10-PCS | Mod: RT,,, | Performed by: ORTHOPAEDIC SURGERY

## 2023-11-09 PROCEDURE — 1160F PR REVIEW ALL MEDS BY PRESCRIBER/CLIN PHARMACIST DOCUMENTED: ICD-10-PCS | Mod: CPTII,,, | Performed by: ORTHOPAEDIC SURGERY

## 2023-11-09 PROCEDURE — 3079F DIAST BP 80-89 MM HG: CPT | Mod: CPTII,,, | Performed by: ORTHOPAEDIC SURGERY

## 2023-11-09 PROCEDURE — 3074F SYST BP LT 130 MM HG: CPT | Mod: CPTII,,, | Performed by: ORTHOPAEDIC SURGERY

## 2023-11-09 PROCEDURE — 1160F RVW MEDS BY RX/DR IN RCRD: CPT | Mod: CPTII,,, | Performed by: ORTHOPAEDIC SURGERY

## 2023-11-09 NOTE — PROCEDURES
Large Joint Aspiration/Injection    Date/Time: 11/9/2023 9:00 AM    Performed by: Phil Collins MD  Authorized by: Phil Collins MD    Consent Done?:  Yes (Verbal)  Indications:  Pain  Site marked: the procedure site was marked    Timeout: prior to procedure the correct patient, procedure, and site was verified    Prep: patient was prepped and draped in usual sterile fashion      Local anesthesia used?: Yes    Local anesthetic:  Topical anesthetic and lidocaine 2% without epinephrine    Details:  Needle Size:  18 G  Ultrasonic Guidance for needle placement?: No    Approach:  Superior  Location:  Knee  Medications:  48 mg hylan g-f 20 48 mg/6 mL

## 2023-11-09 NOTE — PROGRESS NOTES
[facility]  Orthopedic Clinic Note      Chief Complaint:   Chief Complaint   Patient presents with    Appointment     Here today for right knee synvisc injection. She states the last injection helped her knee pain and lasted a little longer. She is pleased with that and his here for another one today.      Referring Physician: Phil Collins MD      History of Present Illness:    This is a patient last seen in the past for an Achilles tendon rupture that had wound healing issues and had a debridement but finally healed from the debridement and Achilles rupture. She comes in today for Right knee pain that has been experiencing for the last year. She has had injections in her right knee previously. She states it was somewhat helpful.  9/16/2021 patient presents for follow-up on right knee pain. She received a steroid injection at her last visit on 7/27/2021. She reports that the injection did help, but her symptoms were only relieved for about 1 week. Her symptoms have returned to their prior severity.  9/30/2021 Pt presents for right knee Synvisc injection today. She is ready to proceed with right knee Synvisc injection. Her right knee symptoms are unchanged since her prior visit.  She also complains of left ankle pain x1 week. She reports that she got out of bed and felt some immediate pain in her left ankle. This was associated with some swelling. She has a history of a prior right Achilles tendon repair. She reports no impact to her range of motion, just some tenderness and pain with weightbearing on the left lower extremity.  11/30/2021 Patient presents for 8 week follow up on right knee Synvisc injection. She reports improvement in her symptoms since the injection, with only intermittent pain that typically occurs after she has been more active than normal. She continues to apply her topical analgesic and take Ibuprofen as needed for pain. She is still participating in PT for her left ankle pain and  reports improvement in her symptoms.  2022 patient presents for right knee Synvisc injection today. Her symptoms are unchanged since her prior visit.  10/11/2022 patient presents for re-evaluation of right knee pain.  She received a right knee viscosupplementation injection her prior visit.  She reports that she did extremely well following the viscosupplementation injection.  Her symptoms are slowly returning.  She states that today her pain is a 2/10 in severity.  10/25/2022 this patient comes in today for right knee Synvisc injection.  2023 this patient had a Synvisc injection and states that it was helpful.  This was done approximately 4 months ago.  2023 patient presents for right knee viscosupplementation injection today.  She reports that these injections are helpful until they begin to wear off 4-5 months after the injection.  She continues to take ibuprofen 800 mg as needed for pain.  She continues her weight loss journey.  2023 this patient is here for right knee Synvisc today.      Past Medical History:   Diagnosis Date    Abnormal Pap smear of cervix     Arthritis     Carpal tunnel syndrome     Gastritis     Hypertension     Respiratory distress        Past Surgical History:   Procedure Laterality Date     SECTION      CHOLECYSTECTOMY      gastric sleeve  2022    HAND SURGERY      LAPAROSCOPIC CHOLECYSTECTOMY  10/13/2022    NERVE REPAIR      RECTAL SURGERY      REPAIR OF LIGAMENT OF ANKLE      TONSILLECTOMY      VAGINA SURGERY      vaginal fistula    WISDOM TOOTH EXTRACTION         Current Outpatient Medications   Medication Sig    calcium-vitamin D3 (OS-FIGUEROA 500 + D3) 500 mg-5 mcg (200 unit) per tablet Take 1 tablet by mouth.    cetirizine (ZYRTEC) 10 MG tablet Take 10 mg by mouth daily as needed.    clindamycin (CLEOCIN T) 1 % Swab SMARTSI Topical Every Morning    clotrimazole (LOTRIMIN) 1 % cream Apply topically 2 (two) times daily.     dextroamphetamine-amphetamine (ADDERALL XR) 30 MG 24 hr capsule     diclofenac sodium (VOLTAREN) 1 % Gel APPLY TOPICALLY TO THE AFFECTED AREA FOUR TIMES DAILY AS NEEDED FOR PAIN    fluticasone propionate (FLONASE) 50 mcg/actuation nasal spray prn    hyoscyamine (LEVSIN) 0.125 mg/5 mL Elix Take 0.125 mg by mouth.    ibuprofen (ADVIL,MOTRIN) 800 MG tablet TAKE 1 TABLET(800 MG) BY MOUTH THREE TIMES DAILY WITH FOOD AS NEEDED FOR PAIN    LINZESS 145 mcg Cap capsule Take 145 mcg by mouth once daily.    MV-MN-IRON FUM-FA-OMEGA3,6,9#3 ORAL Take 1 tablet by mouth once daily.    nystatin-triamcinolone (MYCOLOG II) cream Apply topically 2 (two) times daily as needed.    omeprazole (PRILOSEC) 20 MG capsule Take 20 mg by mouth once daily.    ondansetron (ZOFRAN-ODT) 4 MG TbDL Take 4 mg by mouth every 8 (eight) hours as needed.    PAXIL 30 mg tablet Take 30 mg by mouth every morning.    sucralfate (CARAFATE) 1 gram tablet Take 1 g by mouth 4 (four) times daily.    traZODone (DESYREL) 50 MG tablet Take  mg by mouth nightly.    aluminum & magnesium hydroxide-simethicone (MYLANTA MAX STRENGTH) 400-400-40 mg/5 mL suspension Take 5 mLs by mouth every 6 (six) hours as needed.     No current facility-administered medications for this visit.       Review of patient's allergies indicates:   Allergen Reactions    Macrolide antibiotics     Nitrofurantoin      Other reaction(s): Palpitations  Other reaction(s): Palpitations    Nitrofurantoin monohyd/m-cryst Palpitations       Family History   Problem Relation Age of Onset    COPD Mother     Arthritis Mother     Lung cancer Mother     Hypertension Mother     Hyperlipidemia Father     Rheum arthritis Father     Rheum arthritis Sister        Social History     Socioeconomic History    Marital status: Unknown    Number of children: 3   Tobacco Use    Smoking status: Never    Smokeless tobacco: Never   Substance and Sexual Activity    Alcohol use: Not Currently    Drug use: Never    Sexual  "activity: Yes     Partners: Male   Social History Narrative    ** Merged History Encounter **          Social Determinants of Health     Physical Activity: Sufficiently Active (7/26/2023)    Exercise Vital Sign     Days of Exercise per Week: 5 days     Minutes of Exercise per Session: 30 min           Review of Systems:  All review of systems negative except for those stated in the HPI.    Examination:    Vital Signs:    Vitals:    11/09/23 0842   BP: 129/80   Pulse: 108   Weight: 82.3 kg (181 lb 6.4 oz)   Height: 5' 1" (1.549 m)         Body mass index is 34.28 kg/m².    Physical Examination:  General: Well-developed, well-nourished.  Neuro: Alert and oriented x 3.  Psych: Normal mood and affect.  Right knee Exam:  No obvious deformity. Range of motion from 0-130 degrees. Increased subluxation of the kneecap medially and laterally greater than 1 cm. Negative patella tendon tenderness. Negative J sign. Negative Lachman and anterior drawer test. Negative posterior drawer test. Negative varus and valgus stress test. Negative medial joint line tenderness. Negative lateral joint line tenderness. 4/5 strength and normal skin appearance. Sensibility normal.      Imaging: Prior four views of the right knee demonstrate degenerative changes with joint space narrowing, particularly in the medial aspect of the tibial femoral compartment and the patellofemoral compartment of the knee.        Assessment: Primary osteoarthritis of right knee  -     Large Joint Aspiration/Injection        Plan:  This patient underwent a right knee Synvisc injection today without any complications.  She will come back to see me as needed.    Phil Collins MD personally performed the services described in this documentation, including but not limited to patient's history, physical examination, and assessment and plan of care. All medical record entries made by JOSE Noble were performed at his direction and in his presence. The medical record " was reviewed and is accurate and complete.    Large Joint Aspiration/Injection    Date/Time: 11/9/2023 9:00 AM    Performed by: Phil Collins MD  Authorized by: Phil Collins MD    Consent Done?:  Yes (Verbal)  Indications:  Pain  Site marked: the procedure site was marked    Timeout: prior to procedure the correct patient, procedure, and site was verified    Prep: patient was prepped and draped in usual sterile fashion      Local anesthesia used?: Yes    Local anesthetic:  Topical anesthetic and lidocaine 2% without epinephrine    Details:  Needle Size:  18 G  Ultrasonic Guidance for needle placement?: No    Approach:  Superior  Location:  Knee  Medications:  48 mg hylan g-f 20 48 mg/6 mL      No follow-ups on file.      DISCLAIMER: This note may have been dictated using voice recognition software and may contain grammatical errors.     NOTE: Consult report sent to referring provider via [facility].

## 2023-12-28 ENCOUNTER — HOSPITAL ENCOUNTER (OUTPATIENT)
Dept: RADIOLOGY | Facility: HOSPITAL | Age: 48
Discharge: HOME | End: 2023-12-28
Attending: NURSE PRACTITIONER
Payer: MEDICARE

## 2023-12-28 ENCOUNTER — OFFICE VISIT (OUTPATIENT)
Dept: INTERNAL MEDICINE | Facility: CLINIC | Age: 48
End: 2023-12-28
Payer: MEDICARE

## 2023-12-28 VITALS
DIASTOLIC BLOOD PRESSURE: 79 MMHG | WEIGHT: 189 LBS | HEIGHT: 61 IN | TEMPERATURE: 98 F | SYSTOLIC BLOOD PRESSURE: 124 MMHG | HEART RATE: 77 BPM | BODY MASS INDEX: 35.68 KG/M2 | RESPIRATION RATE: 16 BRPM

## 2023-12-28 DIAGNOSIS — D50.9 IRON DEFICIENCY ANEMIA, UNSPECIFIED IRON DEFICIENCY ANEMIA TYPE: ICD-10-CM

## 2023-12-28 DIAGNOSIS — M62.81 MUSCLE WEAKNESS OF UPPER EXTREMITY: ICD-10-CM

## 2023-12-28 DIAGNOSIS — M62.81 MUSCLE WEAKNESS OF UPPER EXTREMITY: Primary | ICD-10-CM

## 2023-12-28 DIAGNOSIS — K14.6 BURNING TONGUE: ICD-10-CM

## 2023-12-28 PROCEDURE — 72040 X-RAY EXAM NECK SPINE 2-3 VW: CPT | Mod: TC

## 2023-12-28 PROCEDURE — 99215 OFFICE O/P EST HI 40 MIN: CPT | Mod: PBBFAC | Performed by: NURSE PRACTITIONER

## 2023-12-28 PROCEDURE — 3078F PR MOST RECENT DIASTOLIC BLOOD PRESSURE < 80 MM HG: ICD-10-PCS | Mod: CPTII,,, | Performed by: NURSE PRACTITIONER

## 2023-12-28 PROCEDURE — 99214 OFFICE O/P EST MOD 30 MIN: CPT | Mod: S$PBB,,, | Performed by: NURSE PRACTITIONER

## 2023-12-28 PROCEDURE — 3078F DIAST BP <80 MM HG: CPT | Mod: CPTII,,, | Performed by: NURSE PRACTITIONER

## 2023-12-28 PROCEDURE — 3008F BODY MASS INDEX DOCD: CPT | Mod: CPTII,,, | Performed by: NURSE PRACTITIONER

## 2023-12-28 PROCEDURE — 3074F PR MOST RECENT SYSTOLIC BLOOD PRESSURE < 130 MM HG: ICD-10-PCS | Mod: CPTII,,, | Performed by: NURSE PRACTITIONER

## 2023-12-28 PROCEDURE — 1159F MED LIST DOCD IN RCRD: CPT | Mod: CPTII,,, | Performed by: NURSE PRACTITIONER

## 2023-12-28 PROCEDURE — 99214 PR OFFICE/OUTPT VISIT, EST, LEVL IV, 30-39 MIN: ICD-10-PCS | Mod: S$PBB,,, | Performed by: NURSE PRACTITIONER

## 2023-12-28 PROCEDURE — 1159F PR MEDICATION LIST DOCUMENTED IN MEDICAL RECORD: ICD-10-PCS | Mod: CPTII,,, | Performed by: NURSE PRACTITIONER

## 2023-12-28 PROCEDURE — 3074F SYST BP LT 130 MM HG: CPT | Mod: CPTII,,, | Performed by: NURSE PRACTITIONER

## 2023-12-28 PROCEDURE — 3008F PR BODY MASS INDEX (BMI) DOCUMENTED: ICD-10-PCS | Mod: CPTII,,, | Performed by: NURSE PRACTITIONER

## 2023-12-28 RX ORDER — OXYCODONE AND ACETAMINOPHEN 7.5; 325 MG/1; MG/1
1 TABLET ORAL EVERY 6 HOURS PRN
COMMUNITY
Start: 2023-12-15

## 2023-12-28 RX ORDER — FUROSEMIDE 20 MG/1
20 TABLET ORAL
COMMUNITY

## 2023-12-28 RX ORDER — PANCRELIPASE 36000; 180000; 114000 [USP'U]/1; [USP'U]/1; [USP'U]/1
2 CAPSULE, DELAYED RELEASE PELLETS ORAL
COMMUNITY
Start: 2023-12-22 | End: 2024-01-05

## 2023-12-28 NOTE — PROGRESS NOTES
L Walter, NP   [facility]  [address] [facility] [address]      PATIENT NAME: Beverly Lee  : 1975  DATE: 23  MRN: [id number]        Reason for Visit / Chief Complaint: Oral Pain (burning) and muscle weakness (Upper extremities)       History of Present Illness / Problem Focused Workflow     Beverly Lee presents to the clinic with Oral Pain (burning) and muscle weakness (Upper extremities)     47 yo AAF accompanied by her mother for c/o burning tongue for approximately 1 month. She states since her panniculectomy 23 she has experienced burning tongue when she eats anything seasoned. She is eating very little because of this. Had complication after surgery with abscess and cellulitis which she was treated for with drainage and antibiotics. Doing well now. Had f/u with surgeon yesterday. Admits she stopped MV a few days after surgery. Recent H/H noted to be low. She denies any burning to tongue, redness or sores noted. Appetite is good but not eating because of burning tongue. Additionally c/o upper extremity weakness noticeable in the evenings. She states it is difficult to get herself out of the bed or to sit up from lying position due to difficulty moving upper arms. She states extremities feel weak and feels it in the neck down to chest and back. Recalls sleeping in recliner since her surgery last month and awoke one morning with stiff neck. Describes as feeling sore in the neck and arms. Denies SOB, CP. Family history of ALS (paternal uncle), parkinson (paternal uncle), RA (dad, sister). She states this has never happened to her before and ongoing over the past 4 days. She denies any other concerns/ complaints at this time.    Other providers:   Dr. Saranya Collins         Review of Systems     Review of Systems   Constitutional: Negative.    HENT: Negative.          Tongue burning   Eyes:  Negative.    Respiratory: Negative.     Cardiovascular: Negative.    Gastrointestinal: Negative.    Endocrine: Negative.    Genitourinary: Negative.    Musculoskeletal: Negative.    Skin: Negative.    Allergic/Immunologic: Negative.    Neurological:  Positive for weakness (upper extremities).   Hematological: Negative.    Psychiatric/Behavioral: Negative.         Medical / Social / Family History     ----------------------------  Abnormal Pap smear of cervix  Arthritis  Carpal tunnel syndrome  Gastritis  Hypertension  Respiratory distress     Past Surgical History:   Procedure Laterality Date     SECTION      CHOLECYSTECTOMY      gastric sleeve  2022    HAND SURGERY      LAPAROSCOPIC CHOLECYSTECTOMY  10/13/2022    NERVE REPAIR      RECTAL SURGERY      REPAIR OF LIGAMENT OF ANKLE      TONSILLECTOMY      VAGINA SURGERY      vaginal fistula    WISDOM TOOTH EXTRACTION         Social History     Socioeconomic History    Marital status:     Number of children: 3   Tobacco Use    Smoking status: Never    Smokeless tobacco: Never   Substance and Sexual Activity    Alcohol use: Not Currently    Drug use: Never    Sexual activity: Yes     Partners: Male   Social History Narrative    ** Merged History Encounter **          Social Determinants of Health     Physical Activity: Sufficiently Active (2023)    Exercise Vital Sign     Days of Exercise per Week: 5 days     Minutes of Exercise per Session: 30 min        Family History   Problem Relation Age of Onset    COPD Mother     Arthritis Mother     Lung cancer Mother     Hypertension Mother     Hyperlipidemia Father     Rheum arthritis Father     Rheum arthritis Sister     ALS Paternal Uncle     Parkinsonism Paternal Uncle         Medications and Allergies     Medications  Current Outpatient Medications   Medication Instructions    aluminum & magnesium hydroxide-simethicone (MYLANTA MAX STRENGTH) 400-400-40 mg/5 mL suspension 5 mLs, Oral, Every 6 hours  "PRN    calcium-vitamin D3 (OS-FIGUEROA 500 + D3) 500 mg-5 mcg (200 unit) per tablet 1 tablet, Oral    cetirizine (ZYRTEC) 10 mg, Oral, Daily PRN    clindamycin (CLEOCIN T) 1 % Swab SMARTSI Topical Every Morning    clotrimazole (LOTRIMIN) 1 % cream Topical (Top), 2 times daily    CREON 36,000-114,000- 180,000 unit CpDR 2 capsules, Oral    dextroamphetamine-amphetamine (ADDERALL XR) 30 MG 24 hr capsule No dose, route, or frequency recorded.    diclofenac sodium (VOLTAREN) 1 % Gel APPLY TOPICALLY TO THE AFFECTED AREA FOUR TIMES DAILY AS NEEDED FOR PAIN    fluticasone propionate (FLONASE) 50 mcg/actuation nasal spray prn    furosemide (LASIX) 20 mg, Oral, Every 48 hours PRN    hyoscyamine (LEVSIN) 0.125 mg, Oral    ibuprofen (ADVIL,MOTRIN) 800 MG tablet TAKE 1 TABLET(800 MG) BY MOUTH THREE TIMES DAILY WITH FOOD AS NEEDED FOR PAIN    LIDOcaine-diphenhyd-Al-mag-sim (FIRST-MOUTHWASH BLM) -821-40 mg/30mL mouthwash suspension 10 mLs, Swish & Spit, 3 times daily    LINZESS 145 mcg, Oral, Daily    MV-MN-IRON FUM-FA-OMEGA3,6,9#3 ORAL 1 tablet, Oral, Daily    nystatin-triamcinolone (MYCOLOG II) cream Topical (Top), 2 times daily PRN    omeprazole (PRILOSEC) 20 mg, Oral, Daily    ondansetron (ZOFRAN-ODT) 4 mg, Oral, Every 8 hours PRN    oxyCODONE-acetaminophen (PERCOCET) 7.5-325 mg per tablet 1 tablet, Oral, Every 6 hours PRN    PaxiL 30 mg, Oral, Every morning    sucralfate (CARAFATE) 1 g, Oral, 4 times daily    traZODone (DESYREL)  mg, Oral, Nightly       Allergies  Review of patient's allergies indicates:   Allergen Reactions    Macrolide antibiotics     Nitrofurantoin      Other reaction(s): Palpitations  Other reaction(s): Palpitations    Nitrofurantoin monohyd/m-cryst Palpitations       Physical Examination     Visit Vitals  /79 (BP Location: Left arm, Patient Position: Sitting, BP Method: X-Large (Manual))   Pulse 77   Temp 98.1 °F (36.7 °C) (Oral)   Resp 16   Ht 5' 1" (1.549 m)   Wt 85.7 kg (189 lb)   LMP " 12/19/2023 (Exact Date)   BMI 35.71 kg/m²       Physical Exam  Constitutional:       General: She is not in acute distress.     Appearance: Normal appearance. She is not ill-appearing or diaphoretic.   HENT:      Head: Normocephalic.      Mouth/Throat:      Mouth: Mucous membranes are moist.      Pharynx: No oropharyngeal exudate or posterior oropharyngeal erythema.   Cardiovascular:      Rate and Rhythm: Normal rate.      Heart sounds: No murmur heard.  Pulmonary:      Effort: Pulmonary effort is normal.   Musculoskeletal:         General: Normal range of motion.   Skin:     General: Skin is warm and dry.   Neurological:      Mental Status: She is alert and oriented to person, place, and time. Mental status is at baseline.      Coordination: Coordination normal.      Gait: Gait normal.   Psychiatric:         Mood and Affect: Mood normal.         Behavior: Behavior normal.         Thought Content: Thought content normal.         Judgment: Judgment normal.           Results       Assessment       ICD-10-CM ICD-9-CM   1. Muscle weakness of upper extremity  M62.81 728.87   2. Burning tongue  K14.6 529.6   3. Iron deficiency anemia, unspecified iron deficiency anemia type  D50.9 280.9   4. Body mass index (BMI) 35.0-35.9, adult  Z68.35 V85.35       Plan       Problem List Items Addressed This Visit          Oncology    Iron deficiency anemia  Mild anemia noted with hemoglobin and hematocrit 9.9 and 31.2 patient is not on oral iron pills.  She is taking multivitamin with 18 mg iron supplement.    Relevant Orders    CBC Auto Differential (Completed)    Ferritin (Completed)    Iron and TIBC (Completed)       Orthopedic    Muscle weakness of upper extremity - Primary    She reports 4 days of muscle weakness noticed in upper extremities from neck down to chest and upper back.  She denies any injuries.  She does recall episode of neck pain a few weeks ago after sleeping in recliner since her surgery.  We will obtain XR  cervical spine.  She does have family history of rheumatoid arthritis and ALS.  Labs ordered.  XR cervical spine and pending referral for EMG study.  Pending further orders once review test results.  Possible neurology referral    Relevant Orders    Vitamin B12 (Completed)    X-Ray Cervical Spine 2 or 3 Views (Completed)    TSH (Completed)     Other Visit Diagnoses       Burning tongue       She reports since her surgery November 30, 2023 she has experienced burning tongue after eating season foods.  This is causing her to have a decrease in food intake.  She did not  prescription mouthwash that was sent because it was too expensive and not covered by her insurance.  Rx sent to [facility] pharmacy to obtain.  Labs ordered.  Patient to notify provider for any changes or worsening symptoms.    Relevant Medications    LIDOcaine-diphenhyd-Al-mag-sim (FIRST-MOUTHWASH BLM) -139-40 mg/30mL mouthwash suspension    Other Relevant Orders    CBC Auto Differential (Completed)    Vitamin B12 (Completed)    Comprehensive Metabolic Panel (Completed)    Vitamin D (Completed)    Body mass index (BMI) 35.0-35.9, adult      BMI 35.71  Educated on increased risk of disease s/t obesity.  Educated on health benefits of at least 5 days/ week of 30 minutes moderate intensity exercise (brisk walking) and 2 or more days/ week of muscle strength activities (as tolerated).  Eat well balanced diet of fresh fruits/ vegetables, whole grains, lean meats and limit high carbohydrate foods.       Relevant Orders    Vitamin D (Completed)            Future Appointments   Date Time Provider Department Center   2/1/2024  8:40 AM Lauren Watson NP [facility] [place]   3/8/2024  9:00 AM [facility] [place]   8/7/2024  9:20 AM Lauren Watson NP [facility]   8/14/2024  8:10 AM Denia Quiles, ANP [facility] [place]        Follow up in about 4 weeks (around 1/25/2024).    Signature:  Lauren MCGARRY  Walter, NP  [facility]  [address] [facility] [address]    Date of encounter: 12/28/23

## 2023-12-29 DIAGNOSIS — M62.81 MUSCLE WEAKNESS OF UPPER EXTREMITY: Primary | ICD-10-CM

## 2024-01-05 ENCOUNTER — TELEPHONE (OUTPATIENT)
Dept: INTERNAL MEDICINE | Facility: CLINIC | Age: 49
End: 2024-01-05
Payer: MEDICARE

## 2024-01-05 DIAGNOSIS — M62.81 MUSCLE WEAKNESS OF UPPER EXTREMITY: Primary | ICD-10-CM

## 2024-01-05 NOTE — TELEPHONE ENCOUNTER
Ms Paul calling reporting she was encourage to inform provider is arm pain has not improved. She reports she continue to have bilateral arm pain.  Informed patient LETHA Watson is out of office until Monday 1/8/2024 but will send communication.    I informed patient there are no available appointments until February but will place on cancellation list. She verbalize understanding.   I also encourage patient to report to urgent care or ED if symptoms worsen. She verbalize understanding.

## 2024-01-08 NOTE — TELEPHONE ENCOUNTER
Please inform patient I ordered referral for EMG to complete nerve conduction study to further evaluate bilateral arm weakness. Referral ordered to Dr. Shelia Duvall, Neurologist.    Lab and urine results reviewed. Encourage oral ferrous sulfate as discussed during her recent visit.     Thank you

## 2024-02-01 ENCOUNTER — OFFICE VISIT (OUTPATIENT)
Dept: INTERNAL MEDICINE | Facility: CLINIC | Age: 49
End: 2024-02-01
Payer: MEDICARE

## 2024-02-01 VITALS
WEIGHT: 177.81 LBS | RESPIRATION RATE: 20 BRPM | SYSTOLIC BLOOD PRESSURE: 135 MMHG | HEIGHT: 61 IN | HEART RATE: 69 BPM | TEMPERATURE: 98 F | DIASTOLIC BLOOD PRESSURE: 85 MMHG | BODY MASS INDEX: 33.57 KG/M2

## 2024-02-01 DIAGNOSIS — M62.81 MUSCLE WEAKNESS OF UPPER EXTREMITY: ICD-10-CM

## 2024-02-01 DIAGNOSIS — D50.9 IRON DEFICIENCY ANEMIA, UNSPECIFIED IRON DEFICIENCY ANEMIA TYPE: Primary | ICD-10-CM

## 2024-02-01 DIAGNOSIS — Z98.84 S/P BARIATRIC SURGERY: ICD-10-CM

## 2024-02-01 PROCEDURE — 99215 OFFICE O/P EST HI 40 MIN: CPT | Mod: PBBFAC | Performed by: NURSE PRACTITIONER

## 2024-02-01 PROCEDURE — 1159F MED LIST DOCD IN RCRD: CPT | Mod: CPTII,,, | Performed by: NURSE PRACTITIONER

## 2024-02-01 PROCEDURE — 3075F SYST BP GE 130 - 139MM HG: CPT | Mod: CPTII,,, | Performed by: NURSE PRACTITIONER

## 2024-02-01 PROCEDURE — 99214 OFFICE O/P EST MOD 30 MIN: CPT | Mod: S$PBB,,, | Performed by: NURSE PRACTITIONER

## 2024-02-01 PROCEDURE — 3079F DIAST BP 80-89 MM HG: CPT | Mod: CPTII,,, | Performed by: NURSE PRACTITIONER

## 2024-02-01 PROCEDURE — 3008F BODY MASS INDEX DOCD: CPT | Mod: CPTII,,, | Performed by: NURSE PRACTITIONER

## 2024-02-01 NOTE — ASSESSMENT & PLAN NOTE
Refer to last note 12/28/2023  XR cervical spine reviewed without significant findings  Symptoms have improved though continue intermittently with the more voluntary movement patient engages in   Patient was referred to Neurology with tentative appointment in the next few months.  Patient is aware that if appointment is not received within the next 2 weeks to contact this provider for follow-up.  Patient will need follow up with this provider after EMG testing to discuss further workup and if symptoms worsen.

## 2024-02-01 NOTE — PROGRESS NOTES
L Walter, NP   [facility]  [address] [facility] [address]      PATIENT NAME: Beverly Lee  : 1975  DATE: 24  MRN: [id number]        Reason for Visit / Chief Complaint: Follow-up (Muscle weakness)       History of Present Illness / Problem Focused Workflow     Beverly Lee presents to the clinic with Follow-up (Muscle weakness)     23: 47 yo AAF accompanied by her mother for c/o burning tongue for approximately 1 month. She states since her panniculectomy 23 she has experienced burning tongue when she eats anything seasoned. She is eating very little because of this. Had complication after surgery with abscess and cellulitis which she was treated for with drainage and antibiotics. Doing well now. Had f/u with surgeon yesterday. Admits she stopped MV a few days after surgery. Recent H/H noted to be low. She denies any burning to tongue, redness or sores noted. Appetite is good but not eating because of burning tongue. Additionally c/o upper extremity weakness noticeable in the evenings. She states it is difficult to get herself out of the bed or to sit up from lying position due to difficulty moving upper arms. She states extremities feel weak and feels it in the neck down to chest and back. Recalls sleeping in recliner since her surgery last month and awoke one morning with stiff neck. Describes as feeling sore in the neck and arms. Denies SOB, CP. Family history of ALS (paternal uncle), parkinson (paternal uncle), RA (dad, sister). She states this has never happened to her before and ongoing over the past 4 days. She denies any other concerns/ complaints at this time.    24: Patient for follow-up today.  She states she is feeling much better.  She no longer has tongue pain.  She did start taking her iron pills and feels that this is helping.  Upper arm weakness has improved though it still does happen but  not nearly as much.  She notices weakness with more voluntary movement she engages in.  Patient awaiting neuro appointment for EMG testing.  Otherwise denies any other concerns or worsening symptoms at      Other providers:   Dr. Saranya Collins          Review of Systems     Review of Systems   Constitutional: Negative.    HENT: Negative.     Eyes: Negative.    Respiratory: Negative.     Cardiovascular: Negative.    Gastrointestinal: Negative.    Endocrine: Negative.    Genitourinary: Negative.    Musculoskeletal: Negative.    Skin: Negative.    Allergic/Immunologic: Negative.    Neurological:  Positive for weakness.   Hematological: Negative.    Psychiatric/Behavioral: Negative.         Medical / Social / Family History     ----------------------------  Abnormal Pap smear of cervix  Arthritis  Carpal tunnel syndrome  Gastritis  Hypertension  Respiratory distress     Past Surgical History:   Procedure Laterality Date     SECTION      CHOLECYSTECTOMY      gastric sleeve  2022    HAND SURGERY      LAPAROSCOPIC CHOLECYSTECTOMY  10/13/2022    NERVE REPAIR      RECTAL SURGERY      REPAIR OF LIGAMENT OF ANKLE      TONSILLECTOMY      VAGINA SURGERY      vaginal fistula    WISDOM TOOTH EXTRACTION         Social History     Socioeconomic History    Marital status: Single    Number of children: 3   Tobacco Use    Smoking status: Never    Smokeless tobacco: Never   Substance and Sexual Activity    Alcohol use: Not Currently    Drug use: Never    Sexual activity: Yes     Partners: Male   Social History Narrative    ** Merged History Encounter **          Social Determinants of Health     Physical Activity: Sufficiently Active (2023)    Exercise Vital Sign     Days of Exercise per Week: 5 days     Minutes of Exercise per Session: 30 min        Family History   Problem Relation Age of Onset    COPD Mother     Arthritis Mother     Lung cancer Mother     Hypertension Mother      Hyperlipidemia Father     Rheum arthritis Father     Rheum arthritis Sister     ALS Paternal Uncle     Parkinsonism Paternal Uncle         Medications and Allergies     Medications  Current Outpatient Medications   Medication Instructions    aluminum & magnesium hydroxide-simethicone (MYLANTA MAX STRENGTH) 400-400-40 mg/5 mL suspension 5 mLs, Oral, Every 6 hours PRN    calcium-vitamin D3 (OS-FIGUEROA 500 + D3) 500 mg-5 mcg (200 unit) per tablet 1 tablet, Oral    cetirizine (ZYRTEC) 10 mg, Oral, Daily PRN    clindamycin (CLEOCIN T) 1 % Swab SMARTSI Topical Every Morning    clotrimazole (LOTRIMIN) 1 % cream Topical (Top), 2 times daily    dextroamphetamine-amphetamine (ADDERALL XR) 30 MG 24 hr capsule No dose, route, or frequency recorded.    diclofenac sodium (VOLTAREN) 1 % Gel APPLY TOPICALLY TO THE AFFECTED AREA FOUR TIMES DAILY AS NEEDED FOR PAIN    fluticasone propionate (FLONASE) 50 mcg/actuation nasal spray prn    furosemide (LASIX) 20 mg, Oral, Every 48 hours PRN    hyoscyamine (LEVSIN) 0.125 mg, Oral    ibuprofen (ADVIL,MOTRIN) 800 MG tablet TAKE 1 TABLET(800 MG) BY MOUTH THREE TIMES DAILY WITH FOOD AS NEEDED FOR PAIN    LIDOcaine-diphenhyd-Al-mag-sim (FIRST-MOUTHWASH BLM) -332-40 mg/30mL mouthwash suspension 10 mLs, Swish & Spit, 3 times daily    LINZESS 145 mcg, Oral, Daily    MV-MN-IRON FUM-FA-OMEGA3,6,9#3 ORAL 1 tablet, Oral, Daily    nystatin-triamcinolone (MYCOLOG II) cream Topical (Top), 2 times daily PRN    omeprazole (PRILOSEC) 20 mg, Oral, Daily    ondansetron (ZOFRAN-ODT) 4 mg, Oral, Every 8 hours PRN    oxyCODONE-acetaminophen (PERCOCET) 7.5-325 mg per tablet 1 tablet, Oral, Every 6 hours PRN    PaxiL 30 mg, Oral, Every morning    sucralfate (CARAFATE) 1 g, Oral, 4 times daily    traZODone (DESYREL)  mg, Oral, Nightly       Allergies  Review of patient's allergies indicates:   Allergen Reactions    Macrolide antibiotics     Nitrofurantoin      Other reaction(s): Palpitations  Other  "reaction(s): Palpitations    Nitrofurantoin monohyd/m-cryst Palpitations       Physical Examination     Visit Vitals  /85 (BP Location: Left arm, Patient Position: Sitting, BP Method: X-Large (Automatic))   Pulse 69   Temp 98.3 °F (36.8 °C) (Oral)   Resp 20   Ht 5' 1" (1.549 m)   Wt 80.6 kg (177 lb 12.8 oz)   LMP 01/19/2024   BMI 33.60 kg/m²       Physical Exam  Constitutional:       General: She is not in acute distress.     Appearance: Normal appearance. She is not ill-appearing or diaphoretic.   HENT:      Head: Normocephalic.   Cardiovascular:      Rate and Rhythm: Normal rate.   Pulmonary:      Effort: Pulmonary effort is normal. No respiratory distress.   Skin:     General: Skin is warm and dry.   Neurological:      Mental Status: She is alert and oriented to person, place, and time. Mental status is at baseline.      Coordination: Coordination normal.      Gait: Gait normal.   Psychiatric:         Mood and Affect: Mood normal.         Behavior: Behavior normal.         Thought Content: Thought content normal.         Judgment: Judgment normal.           Results     Lab Results   Component Value Date    WBC 8.21 12/28/2023    RBC 3.20 (L) 12/28/2023    HGB 9.5 (L) 12/28/2023    HCT 30.1 (L) 12/28/2023    MCV 94.1 (H) 12/28/2023    MCH 29.7 12/28/2023    MCHC 31.6 (L) 12/28/2023    RDW 12.8 12/28/2023     12/28/2023    MPV 9.4 12/28/2023    EOS 2.5 (H) 12/21/2023    BASO 0.0 12/21/2023    EOSINOPHIL 21 12/21/2023     Sodium   Date Value Ref Range Status   12/15/2023 136 136 - 145 mmol/L Final     Sodium Level   Date Value Ref Range Status   12/28/2023 143 136 - 145 mmol/L Final     Potassium   Date Value Ref Range Status   12/15/2023 3.9 3.5 - 5.1 mmol/L Final     Potassium Level   Date Value Ref Range Status   12/28/2023 3.9 3.5 - 5.1 mmol/L Final     Chloride   Date Value Ref Range Status   12/15/2023 104 100 - 109 mmol/L Final     Carbon Dioxide   Date Value Ref Range Status   12/28/2023 29 22 - " "29 mmol/L Final   12/15/2023 25 22 - 33 mmol/L Final     Blood Urea Nitrogen   Date Value Ref Range Status   12/28/2023 7.8 7.0 - 18.7 mg/dL Final   12/15/2023 11 5 - 25 mg/dL Final     Creatinine   Date Value Ref Range Status   12/28/2023 0.71 0.55 - 1.02 mg/dL Final   12/15/2023 0.73 0.57 - 1.25 mg/dL Final     Calcium   Date Value Ref Range Status   12/15/2023 7.9 (L) 8.8 - 10.6 mg/dL Final     Calcium Level Total   Date Value Ref Range Status   12/28/2023 8.8 8.4 - 10.2 mg/dL Final     Albumin Level   Date Value Ref Range Status   12/28/2023 3.2 (L) 3.5 - 5.0 g/dL Final     Bilirubin Total   Date Value Ref Range Status   12/28/2023 0.2 <=1.5 mg/dL Final     Alkaline Phosphatase   Date Value Ref Range Status   12/28/2023 67 40 - 150 unit/L Final     Aspartate Aminotransferase   Date Value Ref Range Status   12/28/2023 14 5 - 34 unit/L Final     Alanine Aminotransferase   Date Value Ref Range Status   12/28/2023 12 0 - 55 unit/L Final     Anion Gap   Date Value Ref Range Status   12/15/2023 7 (L) 8 - 16 mmol/L Final     eGFR    Date Value Ref Range Status   12/15/2023 103 mL/min/1.73mSq Final     Comment:     In accordance with NKF-ASN Task Force recommendation, calculation based on the Chronic Kidney Disease Epidemiology Collaboration (CKD-EPI) equation without adjustment for race. eGFR adjusted for gender and age and calculated in ml/min/1.73mSquared. eGFR cannot be calculated if patient is under 18 years of age.     Reference Range:   >= 60 ml/min/1.73mSquared.     Estimated GFR-Non    Date Value Ref Range Status   01/24/2022 >60 mL/min/1.73 m2 Final       No results found for: "CHOLHDL"  Lab Results   Component Value Date    TSH 1.897 12/28/2023     Assessment       ICD-10-CM ICD-9-CM   1. Iron deficiency anemia, unspecified iron deficiency anemia type  D50.9 280.9   2. Muscle weakness of upper extremity  M62.81 728.87   3. S/P bariatric surgery  Z98.84 V45.86       Plan "       Problem List Items Addressed This Visit          Oncology    Iron deficiency anemia - Primary    Current Assessment & Plan     Patient's symptoms improved when resumed oral iron.  Patient to continue iron supplements.            Endocrine    S/P bariatric surgery    Overview     Formatting of this note might be different from the original.  S/p sleeve gastrectomy with hiatal hernia repair on 5/26/22    Last Assessment & Plan:   Formatting of this note might be different from the original.  2 weeks post-op  Initial Wt: 282 lb  Pre-op Wt: 258  Todays Wt: 241    Net since surgery: -17 lbs  Net since joining program: -41 lbs    Plan:  - continue post-gastrectomy diet  - continue vitamins  - increase physical activity         Current Assessment & Plan     Patient should begin vitamin B12 supplementation 1000 mcg daily.            Orthopedic    Muscle weakness of upper extremity    Current Assessment & Plan     Refer to last note 12/28/2023  XR cervical spine reviewed without significant findings  Symptoms have improved though continue intermittently with the more voluntary movement patient engages in   Patient was referred to Neurology with tentative appointment in the next few months.  Patient is aware that if appointment is not received within the next 2 weeks to contact this provider for follow-up.  Patient will need follow up with this provider after EMG testing to discuss further workup and if symptoms worsen.              Future Appointments   Date Time Provider Department Center   3/8/2024  9:00 AM Ayla Todd, JOSEPH [facility]   8/7/2024  9:00 AM [facility]   8/14/2024  8:10 AM Denia Quiles, ANP [facility] G[facility]        Follow up if symptoms worsen or fail to improve.    Signature:  Lauren Watson NP  [facility]  [address] [place] [address]    Date of encounter: 2/1/24

## 2024-03-08 ENCOUNTER — OFFICE VISIT (OUTPATIENT)
Dept: ORTHOPEDICS | Facility: CLINIC | Age: 49
End: 2024-03-08
Payer: MEDICARE

## 2024-03-08 ENCOUNTER — HOSPITAL ENCOUNTER (OUTPATIENT)
Dept: RADIOLOGY | Facility: CLINIC | Age: 49
Discharge: HOME | End: 2024-03-08
Payer: MEDICARE

## 2024-03-08 VITALS
SYSTOLIC BLOOD PRESSURE: 137 MMHG | DIASTOLIC BLOOD PRESSURE: 81 MMHG | BODY MASS INDEX: 34.06 KG/M2 | HEART RATE: 91 BPM | HEIGHT: 61 IN | WEIGHT: 180.38 LBS

## 2024-03-08 DIAGNOSIS — M17.11 PRIMARY OSTEOARTHRITIS OF RIGHT KNEE: Primary | ICD-10-CM

## 2024-03-08 DIAGNOSIS — M17.11 PRIMARY OSTEOARTHRITIS OF RIGHT KNEE: ICD-10-CM

## 2024-03-08 PROCEDURE — 3075F SYST BP GE 130 - 139MM HG: CPT | Mod: CPTII,,,

## 2024-03-08 PROCEDURE — 73562 X-RAY EXAM OF KNEE 3: CPT | Mod: RT,,,

## 2024-03-08 PROCEDURE — 1160F RVW MEDS BY RX/DR IN RCRD: CPT | Mod: CPTII,,,

## 2024-03-08 PROCEDURE — 3008F BODY MASS INDEX DOCD: CPT | Mod: CPTII,,,

## 2024-03-08 PROCEDURE — 99213 OFFICE O/P EST LOW 20 MIN: CPT | Mod: ,,,

## 2024-03-08 PROCEDURE — 1159F MED LIST DOCD IN RCRD: CPT | Mod: CPTII,,,

## 2024-03-08 PROCEDURE — 3079F DIAST BP 80-89 MM HG: CPT | Mod: CPTII,,,

## 2024-03-08 NOTE — PROGRESS NOTES
[facility] Clinic  Orthopedic Clinic Note      Chief Complaint:   Chief Complaint   Patient presents with    Right Knee - Injections     4 month f/u right synvisc injection 11/9/2023 with relief, reports its still working but wearing off     Referring Physician: No ref. provider found      History of Present Illness:    This is a patient last seen in the past for an Achilles tendon rupture that had wound healing issues and had a debridement but finally healed from the debridement and Achilles rupture. She comes in today for Right knee pain that has been experiencing for the last year. She has had injections in her right knee previously. She states it was somewhat helpful.  9/16/2021 patient presents for follow-up on right knee pain. She received a steroid injection at her last visit on 7/27/2021. She reports that the injection did help, but her symptoms were only relieved for about 1 week. Her symptoms have returned to their prior severity.  9/30/2021 Pt presents for right knee Synvisc injection today. She is ready to proceed with right knee Synvisc injection. Her right knee symptoms are unchanged since her prior visit.  She also complains of left ankle pain x1 week. She reports that she got out of bed and felt some immediate pain in her left ankle. This was associated with some swelling. She has a history of a prior right Achilles tendon repair. She reports no impact to her range of motion, just some tenderness and pain with weightbearing on the left lower extremity.  11/30/2021 Patient presents for 8 week follow up on right knee Synvisc injection. She reports improvement in her symptoms since the injection, with only intermittent pain that typically occurs after she has been more active than normal. She continues to apply her topical analgesic and take Ibuprofen as needed for pain. She is still participating in PT for her left ankle pain and reports improvement in her symptoms.  4/14/2022 patient presents for  right knee Synvisc injection today. Her symptoms are unchanged since her prior visit.  10/11/2022 patient presents for re-evaluation of right knee pain.  She received a right knee viscosupplementation injection her prior visit.  She reports that she did extremely well following the viscosupplementation injection.  Her symptoms are slowly returning.  She states that today her pain is a 2/10 in severity.  10/25/2022 this patient comes in today for right knee Synvisc injection.  2023 this patient had a Synvisc injection and states that it was helpful.  This was done approximately 4 months ago.  2023 patient presents for right knee viscosupplementation injection today.  She reports that these injections are helpful until they begin to wear off 4-5 months after the injection.  She continues to take ibuprofen 800 mg as needed for pain.  She continues her weight loss journey.  2023 this patient is here for right knee Synvisc today.  2024 patient presents for follow-up on right knee pain.  She received a right knee viscosupplementation injection at her prior visit.  She reports that this injection was quite helpful, but she has begun to notice her symptoms returning.  She would like to discuss repeat injection in the future.      Past Medical History:   Diagnosis Date    Abnormal Pap smear of cervix     Arthritis     Carpal tunnel syndrome     Gastritis     Hypertension     Respiratory distress        Past Surgical History:   Procedure Laterality Date     SECTION      CHOLECYSTECTOMY      gastric sleeve  2022    HAND SURGERY      LAPAROSCOPIC CHOLECYSTECTOMY  10/13/2022    NERVE REPAIR      PENECTOMY, TOTAL, RADICAL, WITH LYMPHADENECTOMY      RECTAL SURGERY      REPAIR OF LIGAMENT OF ANKLE      TONSILLECTOMY      VAGINA SURGERY      vaginal fistula    WISDOM TOOTH EXTRACTION         Current Outpatient Medications   Medication Sig    calcium-vitamin D3 (OS-FIGUEROA 500 + D3) 500 mg-5 mcg (200  unit) per tablet Take 1 tablet by mouth.    cetirizine (ZYRTEC) 10 MG tablet Take 10 mg by mouth daily as needed.    clindamycin (CLEOCIN T) 1 % Swab SMARTSI Topical Every Morning    clotrimazole (LOTRIMIN) 1 % cream Apply topically 2 (two) times daily.    dextroamphetamine-amphetamine (ADDERALL XR) 30 MG 24 hr capsule     diclofenac sodium (VOLTAREN) 1 % Gel APPLY TOPICALLY TO THE AFFECTED AREA FOUR TIMES DAILY AS NEEDED FOR PAIN    fluticasone propionate (FLONASE) 50 mcg/actuation nasal spray prn    furosemide (LASIX) 20 MG tablet Take 20 mg by mouth every 48 hours as needed.    hyoscyamine (LEVSIN) 0.125 mg/5 mL Elix Take 0.125 mg by mouth.    ibuprofen (ADVIL,MOTRIN) 800 MG tablet TAKE 1 TABLET(800 MG) BY MOUTH THREE TIMES DAILY WITH FOOD AS NEEDED FOR PAIN    LIDOcaine-diphenhyd-Al-mag-sim (FIRST-MOUTHWASH BLM) -446-40 mg/30mL mouthwash suspension Swish and spit 10 mLs 3 (three) times daily.    LINZESS 145 mcg Cap capsule Take 145 mcg by mouth once daily.    MV-MN-IRON FUM-FA-OMEGA3,6,9#3 ORAL Take 1 tablet by mouth once daily.    nystatin-triamcinolone (MYCOLOG II) cream Apply topically 2 (two) times daily as needed.    omeprazole (PRILOSEC) 20 MG capsule Take 20 mg by mouth once daily.    PAXIL 30 mg tablet Take 30 mg by mouth every morning.    sucralfate (CARAFATE) 1 gram tablet Take 1 g by mouth 4 (four) times daily.    traZODone (DESYREL) 50 MG tablet Take  mg by mouth nightly.    aluminum & magnesium hydroxide-simethicone (MYLANTA MAX STRENGTH) 400-400-40 mg/5 mL suspension Take 5 mLs by mouth every 6 (six) hours as needed.    ondansetron (ZOFRAN-ODT) 4 MG TbDL Take 4 mg by mouth every 8 (eight) hours as needed.    oxyCODONE-acetaminophen (PERCOCET) 7.5-325 mg per tablet Take 1 tablet by mouth every 6 (six) hours as needed.     No current facility-administered medications for this visit.       Review of patient's allergies indicates:   Allergen Reactions    Macrolide antibiotics      "Nitrofurantoin      Other reaction(s): Palpitations  Other reaction(s): Palpitations    Nitrofurantoin monohyd/m-cryst Palpitations       Family History   Problem Relation Age of Onset    COPD Mother     Arthritis Mother     Lung cancer Mother     Hypertension Mother     Hyperlipidemia Father     Rheum arthritis Father     Rheum arthritis Sister     ALS Paternal Uncle     Parkinsonism Paternal Uncle        Social History     Socioeconomic History    Marital status: Single    Number of children: 3   Tobacco Use    Smoking status: Never    Smokeless tobacco: Never   Substance and Sexual Activity    Alcohol use: Not Currently    Drug use: Never    Sexual activity: Yes     Partners: Male   Social History Narrative    ** Merged History Encounter **          Social Determinants of Health     Physical Activity: Sufficiently Active (7/26/2023)    Exercise Vital Sign     Days of Exercise per Week: 5 days     Minutes of Exercise per Session: 30 min           Review of Systems:  All review of systems negative except for those stated in the HPI.    Examination:    Vital Signs:    Vitals:    03/08/24 0902   BP: 137/81   Pulse: 91   Weight: 81.8 kg (180 lb 6.4 oz)   Height: 5' 1" (1.549 m)         Body mass index is 34.09 kg/m².    Physical Examination:  General: Well-developed, well-nourished.  Neuro: Alert and oriented x 3.  Psych: Normal mood and affect.  Card: Regular rate and rhythm  Resp: Unlabored and quiet.  Right knee Exam:  No obvious deformity. Range of motion from 0-130 degrees. Increased subluxation of the kneecap medially and laterally greater than 1 cm. Negative patella tendon tenderness. Negative J sign. Negative Lachman and anterior drawer test. Negative posterior drawer test. Negative varus and valgus stress test. Negative medial joint line tenderness. Negative lateral joint line tenderness. 4/5 strength and normal skin appearance. Sensibility normal.      Imaging: Four views of the right knee demonstrate " degenerative changes with joint space narrowing, particularly in the medial aspect of the tibial femoral compartment and the patellofemoral compartment of the knee.      Assessment: Primary osteoarthritis of right knee  -     X-Ray Knee 3 View Right; Future; Expected date: 03/08/2024  -     Prior authorization Order      Plan:  X-rays were reviewed with the patient.  She will continue previously prescribed ibuprofen as needed for pain.  Continue topical medications as needed.  We discussed possible corticosteroid injection today, but she would like to defer.  She will return to clinic for repeat right knee viscosupplementation injection once authorization is obtained.  She verbalized understanding of the plan of care with no further questions.         Follow up for Repeat right knee viscosupplementation injection.      DISCLAIMER: This note may have been dictated using voice recognition software and may contain grammatical errors.     NOTE: Consult report sent to referring provider via [employer] EMR.

## 2024-04-22 ENCOUNTER — OFFICE VISIT (OUTPATIENT)
Dept: INTERNAL MEDICINE | Facility: CLINIC | Age: 49
End: 2024-04-22
Payer: MEDICARE

## 2024-04-22 VITALS
DIASTOLIC BLOOD PRESSURE: 84 MMHG | TEMPERATURE: 98 F | SYSTOLIC BLOOD PRESSURE: 135 MMHG | WEIGHT: 184.19 LBS | HEIGHT: 61 IN | BODY MASS INDEX: 34.78 KG/M2 | HEART RATE: 81 BPM

## 2024-04-22 DIAGNOSIS — Z12.31 VISIT FOR SCREENING MAMMOGRAM: ICD-10-CM

## 2024-04-22 DIAGNOSIS — N64.4 BREAST PAIN, LEFT: Primary | ICD-10-CM

## 2024-04-22 PROCEDURE — 99213 OFFICE O/P EST LOW 20 MIN: CPT | Mod: S$PBB,,, | Performed by: NURSE PRACTITIONER

## 2024-04-22 PROCEDURE — 3079F DIAST BP 80-89 MM HG: CPT | Mod: CPTII,,, | Performed by: NURSE PRACTITIONER

## 2024-04-22 PROCEDURE — 99215 OFFICE O/P EST HI 40 MIN: CPT | Mod: PBBFAC | Performed by: NURSE PRACTITIONER

## 2024-04-22 PROCEDURE — 1159F MED LIST DOCD IN RCRD: CPT | Mod: CPTII,,, | Performed by: NURSE PRACTITIONER

## 2024-04-22 PROCEDURE — 3075F SYST BP GE 130 - 139MM HG: CPT | Mod: CPTII,,, | Performed by: NURSE PRACTITIONER

## 2024-04-22 PROCEDURE — 3008F BODY MASS INDEX DOCD: CPT | Mod: CPTII,,, | Performed by: NURSE PRACTITIONER

## 2024-04-22 PROCEDURE — 1160F RVW MEDS BY RX/DR IN RCRD: CPT | Mod: CPTII,,, | Performed by: NURSE PRACTITIONER

## 2024-04-22 NOTE — PROGRESS NOTES
L Walter, NP   [facility]  [address] [facility] [address]      PATIENT NAME: Beverly Lee  : 1975  DATE: 24  MRN: [id number]        History of Present Illness / Problem Focused Workflow     Beverly Lee presents to the clinic with axillary arm (Left arm)     HPI: 47 yo AAF accompanied by her mother for left axillae arm pain and lump noted x 3 days ago. She states left under arm felt sore and when she pressed, felt a bump that is tender to touch. She has not yet been scheduled for screening mammogram for this year. Last mammogram 3/2023. Denies family history of breast cancer. Denies pain to left breast, nipple drainage/bleeding, palpable cysts/ masses/lumps. No other concerns stated.     Review of Systems     Review of Systems   Constitutional: Negative.         Left under arm pain   HENT: Negative.     Eyes: Negative.    Respiratory: Negative.     Cardiovascular: Negative.    Gastrointestinal: Negative.    Endocrine: Negative.    Genitourinary: Negative.    Musculoskeletal: Negative.    Skin: Negative.    Allergic/Immunologic: Negative.    Neurological: Negative.    Hematological: Negative.    Psychiatric/Behavioral: Negative.         Medical / Social / Family History     -------------------------------------    Abnormal Pap smear of cervix    Arthritis    Carpal tunnel syndrome    Gastritis    Hypertension    Respiratory distress        Past Surgical History:   Procedure Laterality Date     SECTION      CHOLECYSTECTOMY      gastric sleeve  2022    HAND SURGERY      LAPAROSCOPIC CHOLECYSTECTOMY  10/13/2022    NERVE REPAIR      PENECTOMY, TOTAL, RADICAL, WITH LYMPHADENECTOMY      RECTAL SURGERY      REPAIR OF LIGAMENT OF ANKLE      TONSILLECTOMY      VAGINA SURGERY      vaginal fistula    WISDOM TOOTH EXTRACTION         Social History     Socioeconomic History    Marital status: Single    Number of children:  3   Tobacco Use    Smoking status: Never    Smokeless tobacco: Never   Substance and Sexual Activity    Alcohol use: Not Currently    Drug use: Never    Sexual activity: Yes     Partners: Male   Social History Narrative    ** Merged History Encounter **          Social Determinants of Health     Physical Activity: Sufficiently Active (2023)    Exercise Vital Sign     Days of Exercise per Week: 5 days     Minutes of Exercise per Session: 30 min        Family History   Problem Relation Name Age of Onset    COPD Mother      Arthritis Mother      Lung cancer Mother      Hypertension Mother      Hyperlipidemia Father      Rheum arthritis Father      Rheum arthritis Sister      ALS Paternal Uncle      Parkinsonism Paternal Uncle          Medications and Allergies     Medications  Current Outpatient Medications   Medication Instructions    aluminum & magnesium hydroxide-simethicone (MYLANTA MAX STRENGTH) 400-400-40 mg/5 mL suspension 5 mLs, Oral, Every 6 hours PRN    calcium-vitamin D3 (OS-FIGUEROA 500 + D3) 500 mg-5 mcg (200 unit) per tablet 1 tablet, Oral    cetirizine (ZYRTEC) 10 mg, Oral, Daily PRN    clindamycin (CLEOCIN T) 1 % Swab SMARTSI Topical Every Morning    clotrimazole (LOTRIMIN) 1 % cream Topical (Top), 2 times daily    dextroamphetamine-amphetamine (ADDERALL XR) 30 MG 24 hr capsule No dose, route, or frequency recorded.    diclofenac sodium (VOLTAREN) 1 % Gel APPLY TOPICALLY TO THE AFFECTED AREA FOUR TIMES DAILY AS NEEDED FOR PAIN    fluticasone propionate (FLONASE) 50 mcg/actuation nasal spray prn    furosemide (LASIX) 20 mg, Oral, Every 48 hours PRN    hyoscyamine (LEVSIN) 0.125 mg, Oral    ibuprofen (ADVIL,MOTRIN) 800 MG tablet TAKE 1 TABLET(800 MG) BY MOUTH THREE TIMES DAILY WITH FOOD AS NEEDED FOR PAIN    LIDOcaine-diphenhyd-Al-mag-sim (FIRST-MOUTHWASH BLM) -220-40 mg/30mL mouthwash suspension 10 mLs, Swish & Spit, 3 times daily    LINZESS 145 mcg, Oral, Daily    MV-MN-IRON FUM-FA-OMEGA3,6,9#3  "ORAL 1 tablet, Oral, Daily    nystatin-triamcinolone (MYCOLOG II) cream Topical (Top), 2 times daily PRN    omeprazole (PRILOSEC) 20 mg, Oral, Daily    ondansetron (ZOFRAN-ODT) 4 mg, Oral, Every 8 hours PRN    oxyCODONE-acetaminophen (PERCOCET) 7.5-325 mg per tablet 1 tablet, Oral, Every 6 hours PRN    PaxiL 30 mg, Oral, Every morning    sucralfate (CARAFATE) 1 g, Oral, 4 times daily    traZODone (DESYREL)  mg, Oral, Nightly       Allergies  Review of patient's allergies indicates:   Allergen Reactions    Macrolide antibiotics     Nitrofurantoin      Other reaction(s): Palpitations  Other reaction(s): Palpitations    Nitrofurantoin monohyd/m-cryst Palpitations       Physical Examination     Visit Vitals  /84 (BP Location: Right arm, Patient Position: Sitting, BP Method: X-Large (Automatic))   Pulse 81   Temp 98.2 °F (36.8 °C) (Oral)   Ht 5' 1" (1.549 m)   Wt 83.6 kg (184 lb 3.2 oz)   LMP 04/19/2024   BMI 34.80 kg/m²       Physical Exam  Constitutional:       General: She is not in acute distress.     Appearance: Normal appearance. She is not ill-appearing or diaphoretic.   HENT:      Head: Normocephalic.   Cardiovascular:      Rate and Rhythm: Normal rate.   Pulmonary:      Effort: Pulmonary effort is normal. No respiratory distress.   Chest:       Skin:     General: Skin is warm and dry.   Neurological:      Mental Status: She is alert and oriented to person, place, and time. Mental status is at baseline.      Coordination: Coordination normal.      Gait: Gait normal.   Psychiatric:         Mood and Affect: Mood normal.         Behavior: Behavior normal.         Thought Content: Thought content normal.         Judgment: Judgment normal.           Results     Assessment       ICD-10-CM ICD-9-CM   1. Breast pain, left  N64.4 611.71   2. Visit for screening mammogram  Z12.31 V76.12       Plan       Problem List Items Addressed This Visit          Renal/    Breast pain, left - Primary  Pt with painful " cyst vs lymph node to left axillae tender to touch onset x 3 days. Last mmg 3/2023; had not been scheduled for screening mmg for this year. Digital diagnostic ordered b/l and left breast US. Contact info provided for central scheduling. Notify provider once completed to discuss results    Relevant Orders    Mammo Digital Diagnostic Bilat     Other Visit Diagnoses       Visit for screening mammogram        Relevant Orders    Mammo Digital Diagnostic Bilat            Future Appointments   Date Time Provider Department Center   5/17/2024  9:00 AM Ayla Todd, JUANP [facility]   8/7/2024  9:00 AM Lauren Watson, NP [facility]   8/14/2024  8:10 AM Denia Quiles, ANP [facility]        Follow up if symptoms worsen or fail to improve.    Signature:  Lauren Watson NP  [facility]  [address] [address]    Date of encounter: 4/22/24

## 2024-05-17 ENCOUNTER — OFFICE VISIT (OUTPATIENT)
Dept: ORTHOPEDICS | Facility: CLINIC | Age: 49
End: 2024-05-17
Payer: MEDICARE

## 2024-05-17 VITALS
HEART RATE: 66 BPM | HEIGHT: 61 IN | WEIGHT: 184.31 LBS | SYSTOLIC BLOOD PRESSURE: 124 MMHG | DIASTOLIC BLOOD PRESSURE: 81 MMHG | BODY MASS INDEX: 34.8 KG/M2

## 2024-05-17 DIAGNOSIS — M65.4 DE QUERVAIN'S TENOSYNOVITIS, LEFT: ICD-10-CM

## 2024-05-17 DIAGNOSIS — M17.11 PRIMARY OSTEOARTHRITIS OF RIGHT KNEE: Primary | ICD-10-CM

## 2024-05-17 PROCEDURE — 3079F DIAST BP 80-89 MM HG: CPT | Mod: CPTII,,,

## 2024-05-17 PROCEDURE — 20610 DRAIN/INJ JOINT/BURSA W/O US: CPT | Mod: RT,,,

## 2024-05-17 PROCEDURE — 1160F RVW MEDS BY RX/DR IN RCRD: CPT | Mod: CPTII,,,

## 2024-05-17 PROCEDURE — 99214 OFFICE O/P EST MOD 30 MIN: CPT | Mod: 25,,,

## 2024-05-17 PROCEDURE — 3008F BODY MASS INDEX DOCD: CPT | Mod: CPTII,,,

## 2024-05-17 PROCEDURE — 1159F MED LIST DOCD IN RCRD: CPT | Mod: CPTII,,,

## 2024-05-17 PROCEDURE — 3074F SYST BP LT 130 MM HG: CPT | Mod: CPTII,,,

## 2024-05-17 RX ORDER — LIDOCAINE HYDROCHLORIDE 20 MG/ML
3 INJECTION, SOLUTION INFILTRATION; PERINEURAL
Status: DISCONTINUED | OUTPATIENT
Start: 2024-05-17 | End: 2024-05-17 | Stop reason: HOSPADM

## 2024-05-17 RX ADMIN — LIDOCAINE HYDROCHLORIDE 3 ML: 20 INJECTION, SOLUTION INFILTRATION; PERINEURAL at 09:05

## 2024-05-17 NOTE — PROCEDURES
Large Joint Aspiration/Injection: R knee    Date/Time: 5/17/2024 9:00 AM    Performed by: Ayla Todd FNP  Authorized by: Ayla Todd FNP    Consent Done?:  Yes (Verbal)  Indications:  Pain  Site marked: the procedure site was marked    Timeout: prior to procedure the correct patient, procedure, and site was verified    Prep: patient was prepped and draped in usual sterile fashion      Local anesthesia used?: Yes    Local anesthetic:  Topical anesthetic  Ultrasonic Guidance for needle placement?: No    Approach:  Superior (superolateral)  Location:  Knee  Site:  R knee  Medications:  48 mg hylan g-f 20 48 mg/6 mL; 3 mL LIDOcaine HCL 20 mg/ml (2%) 20 mg/mL (2 %)  Patient tolerance:  Patient tolerated the procedure well with no immediate complications

## 2024-05-17 NOTE — PROGRESS NOTES
Orthopaedic Clinic  Orthopedic Clinic Note      Chief Complaint:   Chief Complaint   Patient presents with    Appointment     Patient here today for repeat right knee Synvisc injection.     Referring Physician: Ayla Todd FNP      History of Present Illness:    This is a 48-year-old female well known to our clinic who presents for re-evaluation of right knee pain.  She has been treated extensively for right knee osteoarthritis.  This knee pain is moderate in severity.  Patient states pain is worse with ambulation and activity, especially squats and lunges.  Patient states pain is global.  Patient has been treated previously with prescribed anti-inflammatories and topical medications, multiple corticosteroid and viscosupplementation injections, and significant weight loss.  She received a right knee viscosupplementation injection approximately 6 months ago and reports that it alleviated most of her symptoms.  She would like to proceed with repeat viscosupplementation injection today.  She also complains of new onset left wrist pain worsening over the last few weeks.  Significant pain over the radial aspect of the wrist with no associated injury or trauma.  Pain worsens with ulnar deviation of the wrist.  She has attempted her previously prescribed ibuprofen and rest with no improvement in her symptoms.      Past Medical History:   Diagnosis Date    Abnormal Pap smear of cervix     Arthritis     Carpal tunnel syndrome     Gastritis     Hypertension     Respiratory distress        Past Surgical History:   Procedure Laterality Date     SECTION      CHOLECYSTECTOMY      gastric sleeve  2022    HAND SURGERY      LAPAROSCOPIC CHOLECYSTECTOMY  10/13/2022    NERVE REPAIR      PENECTOMY, TOTAL, RADICAL, WITH LYMPHADENECTOMY      RECTAL SURGERY      REPAIR OF LIGAMENT OF ANKLE      TONSILLECTOMY      VAGINA SURGERY      vaginal fistula    WISDOM TOOTH EXTRACTION         Current Outpatient Medications    Medication Sig    aluminum & magnesium hydroxide-simethicone (MYLANTA MAX STRENGTH) 400-400-40 mg/5 mL suspension Take 5 mLs by mouth every 6 (six) hours as needed.    calcium-vitamin D3 (OS-FIGUEROA 500 + D3) 500 mg-5 mcg (200 unit) per tablet Take 1 tablet by mouth.    cetirizine (ZYRTEC) 10 MG tablet Take 10 mg by mouth daily as needed.    clindamycin (CLEOCIN T) 1 % Swab SMARTSI Topical Every Morning    clotrimazole (LOTRIMIN) 1 % cream Apply topically 2 (two) times daily.    dextroamphetamine-amphetamine (ADDERALL XR) 30 MG 24 hr capsule     diclofenac sodium (VOLTAREN) 1 % Gel APPLY TOPICALLY TO THE AFFECTED AREA FOUR TIMES DAILY AS NEEDED FOR PAIN    fluticasone propionate (FLONASE) 50 mcg/actuation nasal spray prn    furosemide (LASIX) 20 MG tablet Take 20 mg by mouth every 48 hours as needed.    hyoscyamine (LEVSIN) 0.125 mg/5 mL Elix Take 0.125 mg by mouth.    ibuprofen (ADVIL,MOTRIN) 800 MG tablet TAKE 1 TABLET(800 MG) BY MOUTH THREE TIMES DAILY WITH FOOD AS NEEDED FOR PAIN    LIDOcaine-diphenhyd-Al-mag-sim (FIRST-MOUTHWASH BLM) -482-40 mg/30mL mouthwash suspension Swish and spit 10 mLs 3 (three) times daily.    LINZESS 145 mcg Cap capsule Take 145 mcg by mouth once daily.    MV-MN-IRON FUM-FA-OMEGA3,6,9#3 ORAL Take 1 tablet by mouth once daily.    nystatin-triamcinolone (MYCOLOG II) cream Apply topically 2 (two) times daily as needed.    omeprazole (PRILOSEC) 20 MG capsule Take 20 mg by mouth once daily.    ondansetron (ZOFRAN-ODT) 4 MG TbDL Take 4 mg by mouth every 8 (eight) hours as needed.    oxyCODONE-acetaminophen (PERCOCET) 7.5-325 mg per tablet Take 1 tablet by mouth every 6 (six) hours as needed.    PAXIL 30 mg tablet Take 30 mg by mouth every morning.    sucralfate (CARAFATE) 1 gram tablet Take 1 g by mouth 4 (four) times daily.    traZODone (DESYREL) 50 MG tablet Take  mg by mouth nightly.     No current facility-administered medications for this visit.       Review of patient's  "allergies indicates:   Allergen Reactions    Macrolide antibiotics     Nitrofurantoin      Other reaction(s): Palpitations  Other reaction(s): Palpitations    Nitrofurantoin monohyd/m-cryst Palpitations       Family History   Problem Relation Name Age of Onset    COPD Mother      Arthritis Mother      Lung cancer Mother      Hypertension Mother      Hyperlipidemia Father      Rheum arthritis Father      Rheum arthritis Sister      ALS Paternal Uncle      Parkinsonism Paternal Uncle         Social History     Socioeconomic History    Marital status: Single    Number of children: 3   Tobacco Use    Smoking status: Never    Smokeless tobacco: Never   Substance and Sexual Activity    Alcohol use: Not Currently    Drug use: Never    Sexual activity: Yes     Partners: Male   Social History Narrative    ** Merged History Encounter **          Social Determinants of Health     Physical Activity: Sufficiently Active (7/26/2023)    Exercise Vital Sign     Days of Exercise per Week: 5 days     Minutes of Exercise per Session: 30 min           Review of Systems:  All review of systems negative except for those stated in the HPI.    Examination:    Vital Signs:    Vitals:    05/17/24 0908   BP: 124/81   Pulse: 66   Weight: 83.6 kg (184 lb 4.9 oz)   Height: 5' 1" (1.549 m)           Body mass index is 34.82 kg/m².    Physical Examination:  General: Well-developed, well-nourished.  Neuro: Alert and oriented x 3.  Psych: Normal mood and affect.  Card: Regular rate and rhythm  Resp: Unlabored and quiet.  Left Wrist Exam:  Tenderness to palpation over radial styloid.  Mild swelling.  Negative flexion-compression test and Phanel´s test.  Positive Finkelstein´s test. Normal skin appearance and palpable pulses and CR<2.  Right knee Exam:  No obvious deformity. Range of motion from 0-130 degrees. Increased subluxation of the kneecap medially and laterally greater than 1 cm. Negative patella tendon tenderness. Negative J sign. Negative " Lachman and anterior drawer test. Negative posterior drawer test. Negative varus and valgus stress test. Negative medial joint line tenderness. Negative lateral joint line tenderness. 4/5 strength and normal skin appearance. Sensibility normal.      Imaging: Four views of the right knee demonstrate degenerative changes with joint space narrowing, particularly in the medial aspect of the tibial femoral compartment and the patellofemoral compartment of the knee.      Assessment: Primary osteoarthritis of right knee  -     Large Joint Aspiration/Injection: R knee    De Quervain's tenosynovitis, left      Plan:  Plan to proceed with repeat right knee viscosupplementation injection today.  In regards to her left wrist, plan to refer her to orthopedic hand specialist for further evaluation recommendations.  We discussed possible corticosteroid injection.  She will continue previously prescribed ibuprofen as needed for pain.  Continue topical medications as needed.  Encouraged ice application, rest, elevation as needed.  She will return to clinic in approximately 3-4 months for re-evaluation.  She verbalized understanding of the plan of care with no further questions.      Large Joint Aspiration/Injection: R knee    Date/Time: 5/17/2024 9:00 AM    Performed by: Ayla Todd FNP  Authorized by: Ayla Todd FNP    Consent Done?:  Yes (Verbal)  Indications:  Pain  Site marked: the procedure site was marked    Timeout: prior to procedure the correct patient, procedure, and site was verified    Prep: patient was prepped and draped in usual sterile fashion      Local anesthesia used?: Yes    Local anesthetic:  Topical anesthetic  Ultrasonic Guidance for needle placement?: No    Approach:  Superior (superolateral)  Location:  Knee  Site:  R knee  Medications:  48 mg hylan g-f 20 48 mg/6 mL; 3 mL LIDOcaine HCL 20 mg/ml (2%) 20 mg/mL (2 %)  Patient tolerance:  Patient tolerated the procedure well with no immediate  complications      Follow up in about 3 months (around 8/17/2024) for Reevaluation.      DISCLAIMER: This note may have been dictated using voice recognition software and may contain grammatical errors.     NOTE: Consult report sent to referring provider via [employer] EMR.

## 2024-05-22 ENCOUNTER — HOSPITAL ENCOUNTER (OUTPATIENT)
Dept: RADIOLOGY | Facility: CLINIC | Age: 49
Discharge: HOME | End: 2024-05-22
Payer: MEDICARE

## 2024-05-22 ENCOUNTER — OFFICE VISIT (OUTPATIENT)
Dept: ORTHOPEDICS | Facility: CLINIC | Age: 49
End: 2024-05-22
Payer: MEDICARE

## 2024-05-22 VITALS
SYSTOLIC BLOOD PRESSURE: 115 MMHG | DIASTOLIC BLOOD PRESSURE: 72 MMHG | HEIGHT: 61 IN | BODY MASS INDEX: 34.8 KG/M2 | WEIGHT: 184.31 LBS | HEART RATE: 69 BPM

## 2024-05-22 DIAGNOSIS — M65.4 DE QUERVAIN'S TENOSYNOVITIS, LEFT: Primary | ICD-10-CM

## 2024-05-22 DIAGNOSIS — M65.4 DE QUERVAIN'S TENOSYNOVITIS, LEFT: ICD-10-CM

## 2024-05-22 PROCEDURE — 1159F MED LIST DOCD IN RCRD: CPT | Mod: CPTII,,,

## 2024-05-22 PROCEDURE — 3078F DIAST BP <80 MM HG: CPT | Mod: CPTII,,,

## 2024-05-22 PROCEDURE — 20550 NJX 1 TENDON SHEATH/LIGAMENT: CPT | Mod: LT,,,

## 2024-05-22 PROCEDURE — 3008F BODY MASS INDEX DOCD: CPT | Mod: CPTII,,,

## 2024-05-22 PROCEDURE — 73110 X-RAY EXAM OF WRIST: CPT | Mod: LT,,,

## 2024-05-22 PROCEDURE — 3074F SYST BP LT 130 MM HG: CPT | Mod: CPTII,,,

## 2024-05-22 PROCEDURE — 99213 OFFICE O/P EST LOW 20 MIN: CPT | Mod: 25,,,

## 2024-05-22 PROCEDURE — 1160F RVW MEDS BY RX/DR IN RCRD: CPT | Mod: CPTII,,,

## 2024-05-22 RX ORDER — METHYLPREDNISOLONE ACETATE 80 MG/ML
80 INJECTION, SUSPENSION INTRA-ARTICULAR; INTRALESIONAL; INTRAMUSCULAR; SOFT TISSUE
Status: DISCONTINUED | OUTPATIENT
Start: 2024-05-22 | End: 2024-05-22 | Stop reason: HOSPADM

## 2024-05-22 RX ORDER — LIDOCAINE HYDROCHLORIDE 10 MG/ML
1 INJECTION INFILTRATION; PERINEURAL
Status: DISCONTINUED | OUTPATIENT
Start: 2024-05-22 | End: 2024-05-22 | Stop reason: HOSPADM

## 2024-05-22 RX ADMIN — LIDOCAINE HYDROCHLORIDE 1 ML: 10 INJECTION INFILTRATION; PERINEURAL at 09:05

## 2024-05-22 RX ADMIN — METHYLPREDNISOLONE ACETATE 80 MG: 80 INJECTION, SUSPENSION INTRA-ARTICULAR; INTRALESIONAL; INTRAMUSCULAR; SOFT TISSUE at 09:05

## 2024-05-22 NOTE — PROGRESS NOTES
Orthopedic Clinic - Hand    Chief Complaint:  Left wrist pain      History of Present Illness: Beverly Lee is a 48 y.o. female here today for left wrist pain.  Patient states that her wrist has been giving her trouble for the last couple of weeks.  She says that she experiences more pain when bending the wrist.  She denies numbness or tingling.  No prior injuries.      Past Medical History:   Diagnosis Date    Abnormal Pap smear of cervix     Arthritis     Carpal tunnel syndrome     Gastritis     Hypertension     Respiratory distress         Past Surgical History:   Procedure Laterality Date     SECTION      CHOLECYSTECTOMY      gastric sleeve  2022    HAND SURGERY      LAPAROSCOPIC CHOLECYSTECTOMY  10/13/2022    NERVE REPAIR      PENECTOMY, TOTAL, RADICAL, WITH LYMPHADENECTOMY      RECTAL SURGERY      REPAIR OF LIGAMENT OF ANKLE      TONSILLECTOMY      VAGINA SURGERY      vaginal fistula    WISDOM TOOTH EXTRACTION          Social History     Tobacco Use    Smoking status: Never    Smokeless tobacco: Never   Substance and Sexual Activity    Alcohol use: Not Currently    Drug use: Never    Sexual activity: Yes     Partners: Male        Current Outpatient Medications   Medication Instructions    aluminum & magnesium hydroxide-simethicone (MYLANTA MAX STRENGTH) 400-400-40 mg/5 mL suspension 5 mLs, Oral, Every 6 hours PRN    calcium-vitamin D3 (OS-FIGUEROA 500 + D3) 500 mg-5 mcg (200 unit) per tablet 1 tablet, Oral    cetirizine (ZYRTEC) 10 mg, Oral, Daily PRN    clindamycin (CLEOCIN T) 1 % Swab SMARTSI Topical Every Morning    clotrimazole (LOTRIMIN) 1 % cream Topical (Top), 2 times daily    dextroamphetamine-amphetamine (ADDERALL XR) 30 MG 24 hr capsule No dose, route, or frequency recorded.    diclofenac sodium (VOLTAREN) 1 % Gel APPLY TOPICALLY TO THE AFFECTED AREA FOUR TIMES DAILY AS NEEDED FOR PAIN    fluticasone propionate (FLONASE) 50 mcg/actuation nasal spray prn    furosemide (LASIX) 20 mg,  "Oral, Every 48 hours PRN    hyoscyamine (LEVSIN) 0.125 mg, Oral    ibuprofen (ADVIL,MOTRIN) 800 MG tablet TAKE 1 TABLET(800 MG) BY MOUTH THREE TIMES DAILY WITH FOOD AS NEEDED FOR PAIN    LIDOcaine-diphenhyd-Al-mag-sim (FIRST-MOUTHWASH BLM) -458-40 mg/30mL mouthwash suspension 10 mLs, Swish & Spit, 3 times daily    LINZESS 145 mcg, Oral, Daily    MV-MN-IRON FUM-FA-OMEGA3,6,9#3 ORAL 1 tablet, Oral, Daily    nystatin-triamcinolone (MYCOLOG II) cream Topical (Top), 2 times daily PRN    omeprazole (PRILOSEC) 20 mg, Oral, Daily    ondansetron (ZOFRAN-ODT) 4 mg, Oral, Every 8 hours PRN    oxyCODONE-acetaminophen (PERCOCET) 7.5-325 mg per tablet 1 tablet, Oral, Every 6 hours PRN    PaxiL 30 mg, Oral, Every morning    sucralfate (CARAFATE) 1 g, Oral, 4 times daily    traZODone (DESYREL)  mg, Oral, Nightly       Review of patient's allergies indicates:   Allergen Reactions    Macrolide antibiotics     Nitrofurantoin      Other reaction(s): Palpitations  Other reaction(s): Palpitations    Nitrofurantoin monohyd/m-cryst Palpitations        Patient Care Team:  Lauren Watson NP as PCP - General (Nurse Practitioner)     Review of Systems:    Constitution:   Denies chills, fever, and sweats.  HENT:   Denies headaches or blurry vision.  Cardiovascular:   Denies chest pain or irregular heart beat.  Respiratory:   Denies cough or shortness of breath.  Gastrointestinal:  Denies abdominal pain, nausea, or vomiting.  Musculoskeletal:   Denies muscle cramps.  Neurological:   Denies dizziness or focal weakness.  Psychiatric/Behavior: Normal mental status.  Hematology/Lymph:  Denies bleeding problem or easy bruising/bleeding.  Skin:    Denies rash or suspicious lesions.    Physical Examination:    Vital Signs:    Vitals:    05/22/24 0909   BP: 115/72   Pulse: 69   Weight: 83.6 kg (184 lb 4.9 oz)   Height: 5' 1" (1.549 m)   Body mass index is 34.82 kg/m².    Constitution:   Well-developed, well nourished patient in no acute " distress.  Neurological:   Alert and oriented x 3 and cooperative to examination.     Psychiatric/Behavior: Normal mental status.  Respiratory:   No shortness of breath. Non-labored breathing.  Eyes:    Extraoccular muscles intact.    Musculoskeletal Examination:     Left Upper Extremity:  [x] No open wounds or rashes.    [] Abnormal skin findings:.  [x] Full ROM of the wrist, hand and digits.    [] Limited ROM of the wrist, hand, and digits:   [x] Radial pulses 2+ is warm well perfused.      Positive Finkelstein's test  Tenderness to palpation of radial aspect of wrist  Mild swelling noted radial aspect of wrist       Imaging:   X-rays of the left wrist three views show no abnormal findings     Assessment:  Left de Quervain's tenosynovitis    Plan:  I think this patient would benefit from a steroid injection.  She has opted to receive a left de Quervain injection today in clinic.  I will send her home with a left thumb spica brace.  I informed her that she should wear this brace as needed.  She can follow up as needed.    Follow Up:  As needed    X-Ray at Next Visit:  None

## 2024-05-22 NOTE — PROCEDURES
Tendon Sheath    Date/Time: 5/22/2024 9:15 AM    Performed by: Elayne Gaytan PA-C  Authorized by: Elayne Gaytan PA-C    Consent Done?:  Yes (Verbal)  Indications:  Pain  Timeout: prior to procedure the correct patient, procedure, and site was verified    Local anesthesia used?: No    Location:  Wrist  Site:  L first doral compartment  Needle size:  25 G  Approach:  Radial  Medications:  1 mL LIDOcaine HCL 10 mg/ml (1%) 10 mg/mL (1 %); 80 mg methylPREDNISolone acetate 80 mg/mL  Patient tolerance:  Patient tolerated the procedure well with no immediate complications

## 2024-05-30 ENCOUNTER — HOSPITAL ENCOUNTER (OUTPATIENT)
Dept: RADIOLOGY | Facility: HOSPITAL | Age: 49
Discharge: HOME | End: 2024-05-30
Attending: NURSE PRACTITIONER
Payer: MEDICARE

## 2024-05-30 DIAGNOSIS — N64.4 BREAST PAIN, LEFT: ICD-10-CM

## 2024-05-30 DIAGNOSIS — Z12.31 VISIT FOR SCREENING MAMMOGRAM: ICD-10-CM

## 2024-05-30 PROCEDURE — 77066 DX MAMMO INCL CAD BI: CPT | Mod: 26,,, | Performed by: RADIOLOGY

## 2024-05-30 PROCEDURE — 76642 ULTRASOUND BREAST LIMITED: CPT | Mod: 26,50,, | Performed by: RADIOLOGY

## 2024-05-30 PROCEDURE — 76642 ULTRASOUND BREAST LIMITED: CPT | Mod: TC,50

## 2024-05-30 PROCEDURE — 77062 BREAST TOMOSYNTHESIS BI: CPT | Mod: TC

## 2024-05-30 PROCEDURE — 77062 BREAST TOMOSYNTHESIS BI: CPT | Mod: 26,,, | Performed by: RADIOLOGY

## 2024-06-04 ENCOUNTER — LAB VISIT (OUTPATIENT)
Dept: LAB | Facility: HOSPITAL | Age: 49
End: 2024-06-04
Attending: NURSE PRACTITIONER
Payer: MEDICARE

## 2024-06-04 ENCOUNTER — TELEPHONE (OUTPATIENT)
Dept: GYNECOLOGY | Facility: CLINIC | Age: 49
End: 2024-06-04

## 2024-06-04 ENCOUNTER — OFFICE VISIT (OUTPATIENT)
Dept: GYNECOLOGY | Facility: CLINIC | Age: 49
End: 2024-06-04
Payer: MEDICARE

## 2024-06-04 VITALS
SYSTOLIC BLOOD PRESSURE: 129 MMHG | BODY MASS INDEX: 34.1 KG/M2 | HEART RATE: 93 BPM | WEIGHT: 180.63 LBS | TEMPERATURE: 99 F | DIASTOLIC BLOOD PRESSURE: 81 MMHG | OXYGEN SATURATION: 100 % | HEIGHT: 61 IN | RESPIRATION RATE: 20 BRPM

## 2024-06-04 DIAGNOSIS — Z11.3 ROUTINE SCREENING FOR STI (SEXUALLY TRANSMITTED INFECTION): ICD-10-CM

## 2024-06-04 DIAGNOSIS — Z12.4 PAP SMEAR FOR CERVICAL CANCER SCREENING: Primary | ICD-10-CM

## 2024-06-04 DIAGNOSIS — N76.0 BV (BACTERIAL VAGINOSIS): ICD-10-CM

## 2024-06-04 DIAGNOSIS — B96.89 BV (BACTERIAL VAGINOSIS): ICD-10-CM

## 2024-06-04 LAB
C TRACH DNA SPEC QL NAA+PROBE: NOT DETECTED
CLUE CELLS VAG QL WET PREP: ABNORMAL
HBV SURFACE AG SERPL QL IA: NONREACTIVE
HCV AB SERPL QL IA: NONREACTIVE
HIV 1+2 AB+HIV1 P24 AG SERPL QL IA: NONREACTIVE
N GONORRHOEA DNA SPEC QL NAA+PROBE: NOT DETECTED
SOURCE (OHS): NORMAL
T PALLIDUM AB SER QL: NONREACTIVE
T VAGINALIS VAG QL WET PREP: ABNORMAL
WBC #/AREA VAG WET PREP: ABNORMAL
YEAST SPEC QL WET PREP: ABNORMAL

## 2024-06-04 PROCEDURE — 86780 TREPONEMA PALLIDUM: CPT

## 2024-06-04 PROCEDURE — 36415 COLL VENOUS BLD VENIPUNCTURE: CPT

## 2024-06-04 PROCEDURE — 86803 HEPATITIS C AB TEST: CPT

## 2024-06-04 PROCEDURE — 1159F MED LIST DOCD IN RCRD: CPT | Mod: CPTII,,, | Performed by: NURSE PRACTITIONER

## 2024-06-04 PROCEDURE — 87210 SMEAR WET MOUNT SALINE/INK: CPT | Performed by: NURSE PRACTITIONER

## 2024-06-04 PROCEDURE — 3074F SYST BP LT 130 MM HG: CPT | Mod: CPTII,,, | Performed by: NURSE PRACTITIONER

## 2024-06-04 PROCEDURE — 99215 OFFICE O/P EST HI 40 MIN: CPT | Mod: PBBFAC | Performed by: NURSE PRACTITIONER

## 2024-06-04 PROCEDURE — 87340 HEPATITIS B SURFACE AG IA: CPT

## 2024-06-04 PROCEDURE — 88174 CYTOPATH C/V AUTO IN FLUID: CPT | Performed by: NURSE PRACTITIONER

## 2024-06-04 PROCEDURE — 87491 CHLMYD TRACH DNA AMP PROBE: CPT | Performed by: NURSE PRACTITIONER

## 2024-06-04 PROCEDURE — 87389 HIV-1 AG W/HIV-1&-2 AB AG IA: CPT

## 2024-06-04 PROCEDURE — 87624 HPV HI-RISK TYP POOLED RSLT: CPT

## 2024-06-04 PROCEDURE — 87624 HPV HI-RISK TYP POOLED RSLT: CPT | Performed by: NURSE PRACTITIONER

## 2024-06-04 PROCEDURE — 3079F DIAST BP 80-89 MM HG: CPT | Mod: CPTII,,, | Performed by: NURSE PRACTITIONER

## 2024-06-04 PROCEDURE — G0101 CA SCREEN;PELVIC/BREAST EXAM: HCPCS | Mod: S$PBB,,, | Performed by: NURSE PRACTITIONER

## 2024-06-04 RX ORDER — METRONIDAZOLE 500 MG/1
500 TABLET ORAL 2 TIMES DAILY
Qty: 14 TABLET | Refills: 0 | Status: SHIPPED | OUTPATIENT
Start: 2024-06-04 | End: 2024-06-11

## 2024-06-04 RX ORDER — SULFAMETHOXAZOLE AND TRIMETHOPRIM 800; 160 MG/1; MG/1
1 TABLET ORAL
COMMUNITY

## 2024-06-04 NOTE — TELEPHONE ENCOUNTER
----- Message from DEMI Khanna sent at 6/4/2024 12:15 PM CDT -----  Swab showed clue cells indicating bacterial vaginosis. Not an STD but an infection in the vaginal area when pH is disrupted. Rx sent for Flagyl. No alcohol during and for 48-72 hours after treatment. Use unscented soap and no scented products. More showers than baths. No douching.

## 2024-06-04 NOTE — PROGRESS NOTES
Swab showed clue cells indicating bacterial vaginosis. Not an STD but an infection in the vaginal area when pH is disrupted. Rx sent for Flagyl. No alcohol during and for 48-72 hours after treatment. Use unscented soap and no scented products. More showers than baths. No douching.

## 2024-06-04 NOTE — PROGRESS NOTES
[facility] -  Gynecology / Women's Health Clinic      Subjective:       Patient ID: Beverly Lee is a 48 y.o. female.    Chief Complaint:  Gynecologic Exam    History of Present Illness  The patient is  here for annual exam. Regular monthly cycles, LMP was 24, lasting 3 days with 4 days of spotting and change pads every 2 hours for hygiene, 1 heavy day. Pt last saw GYN and uro/gyn for fecal incontinence s/p anal sphincteroplasty and laparoscopic bilateral salpingectomy for undesired fertility on 18, with complication of wound dehiscence. Denies history of abnormal paps. Last pap -NIL and HPV neg. MG-24 & BIRADS 4, d/t Rt breast mass on MG, scheduled  for biopsy. Lt axilla tenderness, neg on MG. Currently being treated for bladder infection by PCP. Denies pelvic pain, abnormal bleeding or discharge. Pt reports trichomonas in the past and desires testing. Pt c/o vaginal dryness. Denies tobacco use. Denies fly hx of breast, ovarian, uterine cancer. Colon cancer in father. Colonoscopy in , f/u in 10 years.        GYN & OB History  Patient's last menstrual period was 2024 (exact date).   Date of Last Pap: 11/15/2021    Review of patient's allergies indicates:   Allergen Reactions    Macrolide antibiotics     Nitrofurantoin      Other reaction(s): Palpitations  Other reaction(s): Palpitations    Nitrofurantoin monohyd/m-cryst Palpitations     Past Medical History:   Diagnosis Date    Abnormal Pap smear of cervix     Arthritis     Carpal tunnel syndrome     Gastritis     Hypertension     Respiratory distress      OB History    Para Term  AB Living   7 3     4 3   SAB IAB Ectopic Multiple Live Births   4       3      # Outcome Date GA Lbr Gadiel/2nd Weight Sex Type Anes PTL Lv   7 SAB            6 SAB            5 SAB            4 SAB            3 Para      Vag-Spont   KEITH   2 Para      CS-Unspec   KEITH   1 Para      CS-Unspec   KEITH        Review of  "Systems  Review of Systems    Negative except for pertinent findings for positives per HPI     Objective:    Physical Exam    /81 (BP Location: Left arm, Patient Position: Sitting, BP Method: Medium (Automatic))   Pulse 93   Temp 98.6 °F (37 °C) (Oral)   Resp 20   Ht 5' 1" (1.549 m)   Wt 81.9 kg (180 lb 9.6 oz)   LMP 05/19/2024 (Exact Date)   SpO2 100%   BMI 34.12 kg/m²   GENERAL: Well-developed female. No acute distress.    SKIN: Normal to inspection, warm and intact.  BREASTS: No rashes or erythema. No masses, lumps, discharge, tenderness.  VULVA: General appearance normal; external genitalia with no lesions or erythema.  VAGINA: Mucosa/vaginal vault pink, no abnormal discharge or lesions.  CERVIX: Pink, nulliparous appearing os with nabothian cyst @ 3 o'clock, no erythema or abnormal discharge.  BIMANUAL EXAM: The uterus is non tender. Tien adnexa reveal no tenderness.  PSYCHIATRIC: Patient is oriented to person, place, and time. Mood and affect are normal.    Assessment:         ICD-10-CM ICD-9-CM   1. Pap smear for cervical cancer screening  Z12.4 V76.2   2. Routine screening for STI (sexually transmitted infection)  Z11.3 V74.5     Plan:   Beverly was seen today for gynecologic exam.    Diagnoses and all orders for this visit:    Pap smear for cervical cancer screening  -     Liquid-Based Pap Smear, Screening    Routine screening for STI (sexually transmitted infection)  -     Chlamydia/GC, PCR  -     Wet Prep, Genital  -     HIV 1/2 Ag/Ab (4th Gen); Future  -     Hepatitis C Antibody; Future  -     Hepatitis B Surface Antigen; Future  -     SYPHILIS ANTIBODY (WITH REFLEX RPR); Future     Pap today  STI screen  Keep Breast bx appt  Water base lubricant to self and partner with all sexual encounters. Replense every 3 days for vaginal moisturizer.  Follow up in about 1 year (around 6/4/2025) for annual exam.    "

## 2024-06-04 NOTE — TELEPHONE ENCOUNTER
Patient returned call and informed of results and medication sent to the pharmacy on file. Educated patient on possible hygiene changes. Patient verbalized understanding.

## 2024-06-06 LAB
HIGH RISK HPV: NEGATIVE
PSYCHE PATHOLOGY RESULT: NORMAL

## 2024-06-13 ENCOUNTER — HOSPITAL ENCOUNTER (OUTPATIENT)
Dept: RADIOLOGY | Facility: HOSPITAL | Age: 49
Discharge: HOME | End: 2024-06-13
Attending: NURSE PRACTITIONER
Payer: MEDICARE

## 2024-06-13 DIAGNOSIS — R92.8 ABNORMAL MAMMOGRAM: ICD-10-CM

## 2024-06-13 DIAGNOSIS — N63.15 MASS OVERLAPPING MULTIPLE QUADRANTS OF RIGHT BREAST: Primary | ICD-10-CM

## 2024-06-13 DIAGNOSIS — N64.4 BREAST PAIN, LEFT: ICD-10-CM

## 2024-06-13 PROCEDURE — 27000542 US BREAST BIOPSY WITH IMAGING 1ST SITE RIGHT

## 2024-06-13 PROCEDURE — 77061 BREAST TOMOSYNTHESIS UNI: CPT | Mod: TC,RT

## 2024-06-13 PROCEDURE — 77061 BREAST TOMOSYNTHESIS UNI: CPT | Mod: 26,RT,, | Performed by: RADIOLOGY

## 2024-06-13 PROCEDURE — 88305 TISSUE EXAM BY PATHOLOGIST: CPT | Mod: TC | Performed by: RADIOLOGY

## 2024-06-13 PROCEDURE — 77065 DX MAMMO INCL CAD UNI: CPT | Mod: 26,RT,, | Performed by: RADIOLOGY

## 2024-06-13 PROCEDURE — 19083 BX BREAST 1ST LESION US IMAG: CPT | Mod: RT,,, | Performed by: RADIOLOGY

## 2024-06-13 PROCEDURE — A4648 IMPLANTABLE TISSUE MARKER: HCPCS

## 2024-06-13 PROCEDURE — 19083 BX BREAST 1ST LESION US IMAG: CPT | Mod: RT

## 2024-06-13 PROCEDURE — 77065 DX MAMMO INCL CAD UNI: CPT | Mod: TC,RT

## 2024-06-13 RX ORDER — LIDOCAINE HYDROCHLORIDE 20 MG/ML
INJECTION, SOLUTION EPIDURAL; INFILTRATION; INTRACAUDAL; PERINEURAL
Status: DISCONTINUED
Start: 2024-06-13 | End: 2024-06-14 | Stop reason: HOSPADM

## 2024-06-13 RX ORDER — LIDOCAINE HCL/EPINEPHRINE/PF 2%-1:200K
VIAL (ML) INJECTION
Status: DISCONTINUED
Start: 2024-06-13 | End: 2024-06-14 | Stop reason: HOSPADM

## 2024-06-17 LAB
ESTROGEN SERPL-MCNC: NORMAL PG/ML
INSULIN SERPL-ACNC: NORMAL U[IU]/ML
LAB AP CLINICAL INFORMATION: NORMAL
LAB AP GROSS DESCRIPTION: NORMAL
LAB AP REPORT FOOTNOTES: NORMAL

## 2024-06-18 ENCOUNTER — TELEPHONE (OUTPATIENT)
Dept: RADIOLOGY | Facility: HOSPITAL | Age: 49
End: 2024-06-18
Payer: MEDICARE

## 2024-06-18 NOTE — TELEPHONE ENCOUNTER
----- Message from Anupam Burciaga MD sent at 6/17/2024  4:51 PM CDT -----  Regarding: Benign Pathology  Please see below.     Angeli, please communicate the results of the biopsy and recommendations to the patient.    Thanks.          Pathology is now available for review, and demonstrates:  Ultrasound-guided core biopsy of the right lower central breast 0.9 x 0.5 x 1 cm mass at the 6 o'clock location, 3 cm from the nipple.  Hydromark open coil-shaped biopsy clip is in appropriate position.  - Breast tissue with stromal sclerosis.  - No evidence of malignancy.        Please see pathology report for full details. These pathology results are concordant with imaging findings.     Recommend continued annual screening mammography due in 05/2025, according to American College [employer] guidelines.    Pathology results and recommendations will be communicated by Angeli Benite[facility] nurse navigator, to the patient/provider and documented in the electronic medical record.

## 2024-08-07 ENCOUNTER — LAB VISIT (OUTPATIENT)
Dept: LAB | Facility: HOSPITAL | Age: 49
End: 2024-08-07
Attending: NURSE PRACTITIONER
Payer: MEDICARE

## 2024-08-07 ENCOUNTER — TELEPHONE (OUTPATIENT)
Dept: INTERNAL MEDICINE | Facility: CLINIC | Age: 49
End: 2024-08-07
Payer: MEDICARE

## 2024-08-07 ENCOUNTER — OFFICE VISIT (OUTPATIENT)
Dept: INTERNAL MEDICINE | Facility: CLINIC | Age: 49
End: 2024-08-07
Payer: MEDICARE

## 2024-08-07 VITALS
SYSTOLIC BLOOD PRESSURE: 122 MMHG | HEIGHT: 61 IN | BODY MASS INDEX: 35.52 KG/M2 | TEMPERATURE: 98 F | WEIGHT: 188.13 LBS | RESPIRATION RATE: 18 BRPM | DIASTOLIC BLOOD PRESSURE: 80 MMHG | HEART RATE: 72 BPM

## 2024-08-07 DIAGNOSIS — Z00.00 WELLNESS EXAMINATION: Primary | ICD-10-CM

## 2024-08-07 DIAGNOSIS — Z12.11 COLON CANCER SCREENING: ICD-10-CM

## 2024-08-07 DIAGNOSIS — I10 PRIMARY HYPERTENSION: ICD-10-CM

## 2024-08-07 DIAGNOSIS — E66.9 OBESITY, UNSPECIFIED CLASSIFICATION, UNSPECIFIED OBESITY TYPE, UNSPECIFIED WHETHER SERIOUS COMORBIDITY PRESENT: ICD-10-CM

## 2024-08-07 DIAGNOSIS — Z12.31 VISIT FOR SCREENING MAMMOGRAM: ICD-10-CM

## 2024-08-07 DIAGNOSIS — R15.9 FULL INCONTINENCE OF FECES: ICD-10-CM

## 2024-08-07 DIAGNOSIS — H92.02 OTALGIA OF LEFT EAR: ICD-10-CM

## 2024-08-07 DIAGNOSIS — Z00.00 WELLNESS EXAMINATION: ICD-10-CM

## 2024-08-07 DIAGNOSIS — Z80.0 FAMILY HISTORY OF COLORECTAL CANCER: ICD-10-CM

## 2024-08-07 DIAGNOSIS — D50.9 IRON DEFICIENCY ANEMIA, UNSPECIFIED IRON DEFICIENCY ANEMIA TYPE: ICD-10-CM

## 2024-08-07 PROBLEM — N64.4 BREAST PAIN, LEFT: Status: RESOLVED | Noted: 2024-04-22 | Resolved: 2024-08-07

## 2024-08-07 PROBLEM — N30.01 ACUTE CYSTITIS WITH HEMATURIA: Status: RESOLVED | Noted: 2023-07-26 | Resolved: 2024-08-07

## 2024-08-07 LAB
ALBUMIN SERPL-MCNC: 3.8 G/DL (ref 3.5–5)
ALBUMIN/GLOB SERPL: 1.1 RATIO (ref 1.1–2)
ALP SERPL-CCNC: 69 UNIT/L (ref 40–150)
ALT SERPL-CCNC: 10 UNIT/L (ref 0–55)
ANION GAP SERPL CALC-SCNC: 8 MEQ/L
AST SERPL-CCNC: 16 UNIT/L (ref 5–34)
BASOPHILS # BLD AUTO: 0.02 X10(3)/MCL
BASOPHILS NFR BLD AUTO: 0.2 %
BILIRUB SERPL-MCNC: 0.5 MG/DL
BUN SERPL-MCNC: 10.1 MG/DL (ref 7–18.7)
CALCIUM SERPL-MCNC: 9.7 MG/DL (ref 8.4–10.2)
CHLORIDE SERPL-SCNC: 105 MMOL/L (ref 98–107)
CHOLEST SERPL-MCNC: 200 MG/DL
CHOLEST/HDLC SERPL: 3 {RATIO} (ref 0–5)
CO2 SERPL-SCNC: 28 MMOL/L (ref 22–29)
CREAT SERPL-MCNC: 0.84 MG/DL (ref 0.55–1.02)
CREAT/UREA NIT SERPL: 12
EOSINOPHIL # BLD AUTO: 0.38 X10(3)/MCL (ref 0–0.9)
EOSINOPHIL NFR BLD AUTO: 4.4 %
ERYTHROCYTE [DISTWIDTH] IN BLOOD BY AUTOMATED COUNT: 12.2 % (ref 11.5–17)
GFR SERPLBLD CREATININE-BSD FMLA CKD-EPI: >60 ML/MIN/1.73/M2
GLOBULIN SER-MCNC: 3.4 GM/DL (ref 2.4–3.5)
GLUCOSE SERPL-MCNC: 77 MG/DL (ref 74–100)
HCT VFR BLD AUTO: 38.4 % (ref 37–47)
HDLC SERPL-MCNC: 80 MG/DL (ref 35–60)
HGB BLD-MCNC: 12.7 G/DL (ref 12–16)
IMM GRANULOCYTES # BLD AUTO: 0.01 X10(3)/MCL (ref 0–0.04)
IMM GRANULOCYTES NFR BLD AUTO: 0.1 %
LDLC SERPL CALC-MCNC: 107 MG/DL (ref 50–140)
LYMPHOCYTES # BLD AUTO: 1.63 X10(3)/MCL (ref 0.6–4.6)
LYMPHOCYTES NFR BLD AUTO: 18.8 %
MCH RBC QN AUTO: 31.7 PG (ref 27–31)
MCHC RBC AUTO-ENTMCNC: 33.1 G/DL (ref 33–36)
MCV RBC AUTO: 95.8 FL (ref 80–94)
MONOCYTES # BLD AUTO: 0.58 X10(3)/MCL (ref 0.1–1.3)
MONOCYTES NFR BLD AUTO: 6.7 %
NEUTROPHILS # BLD AUTO: 6.07 X10(3)/MCL (ref 2.1–9.2)
NEUTROPHILS NFR BLD AUTO: 69.8 %
NRBC BLD AUTO-RTO: 0 %
PLATELET # BLD AUTO: 219 X10(3)/MCL (ref 130–400)
PMV BLD AUTO: 10.3 FL (ref 7.4–10.4)
POTASSIUM SERPL-SCNC: 4 MMOL/L (ref 3.5–5.1)
PROT SERPL-MCNC: 7.2 GM/DL (ref 6.4–8.3)
RBC # BLD AUTO: 4.01 X10(6)/MCL (ref 4.2–5.4)
SODIUM SERPL-SCNC: 141 MMOL/L (ref 136–145)
TRIGL SERPL-MCNC: 67 MG/DL (ref 37–140)
VLDLC SERPL CALC-MCNC: 13 MG/DL
WBC # BLD AUTO: 8.69 X10(3)/MCL (ref 4.5–11.5)

## 2024-08-07 PROCEDURE — 3008F BODY MASS INDEX DOCD: CPT | Mod: CPTII,,, | Performed by: NURSE PRACTITIONER

## 2024-08-07 PROCEDURE — 3079F DIAST BP 80-89 MM HG: CPT | Mod: CPTII,,, | Performed by: NURSE PRACTITIONER

## 2024-08-07 PROCEDURE — 85025 COMPLETE CBC W/AUTO DIFF WBC: CPT

## 2024-08-07 PROCEDURE — 80061 LIPID PANEL: CPT

## 2024-08-07 PROCEDURE — 99396 PREV VISIT EST AGE 40-64: CPT | Mod: S$PBB,,, | Performed by: NURSE PRACTITIONER

## 2024-08-07 PROCEDURE — 80053 COMPREHEN METABOLIC PANEL: CPT

## 2024-08-07 PROCEDURE — 1160F RVW MEDS BY RX/DR IN RCRD: CPT | Mod: CPTII,,, | Performed by: NURSE PRACTITIONER

## 2024-08-07 PROCEDURE — 36415 COLL VENOUS BLD VENIPUNCTURE: CPT

## 2024-08-07 PROCEDURE — 99214 OFFICE O/P EST MOD 30 MIN: CPT | Mod: S$PBB,25,, | Performed by: NURSE PRACTITIONER

## 2024-08-07 PROCEDURE — 3074F SYST BP LT 130 MM HG: CPT | Mod: CPTII,,, | Performed by: NURSE PRACTITIONER

## 2024-08-07 PROCEDURE — 99215 OFFICE O/P EST HI 40 MIN: CPT | Mod: PBBFAC | Performed by: NURSE PRACTITIONER

## 2024-08-07 PROCEDURE — 1159F MED LIST DOCD IN RCRD: CPT | Mod: CPTII,,, | Performed by: NURSE PRACTITIONER

## 2024-08-12 RX ORDER — POLYETHYLENE GLYCOL 3350, SODIUM SULFATE, SODIUM CHLORIDE, POTASSIUM CHLORIDE, SODIUM ASCORBATE, AND ASCORBIC ACID 7.5-2.691G
KIT ORAL
Qty: 1 KIT | Refills: 0 | Status: SHIPPED | OUTPATIENT
Start: 2024-08-12

## 2024-08-21 ENCOUNTER — ANESTHESIA EVENT (OUTPATIENT)
Dept: ENDOSCOPY | Facility: HOSPITAL | Age: 49
End: 2024-08-21
Payer: MEDICARE

## 2024-08-21 RX ORDER — LIDOCAINE HYDROCHLORIDE 10 MG/ML
1 INJECTION, SOLUTION EPIDURAL; INFILTRATION; INTRACAUDAL; PERINEURAL ONCE
Status: CANCELLED | OUTPATIENT
Start: 2024-08-21 | End: 2024-08-21

## 2024-08-22 NOTE — ANESTHESIA PREPROCEDURE EVALUATION
08/21/2024  Beverly Lee is a 49 y.o., female with PMHx of obesity, HTN, anxiety/depression presents for screening colonoscopy.    NO BETA BLOCKER USE    Active Ambulatory Problems     Diagnosis Date Noted    Injury to nerves 07/26/2022    Allergic rhinitis 07/26/2022    Mixed anxiety depressive disorder 07/26/2022    Family history of colorectal cancer 11/08/2018    Full incontinence of feces 11/08/2018    History of orthopedic surgery 07/26/2022    Rupture of right Achilles tendon 07/26/2022    Hypertension 07/26/2022    S/P bariatric surgery 06/08/2022    Tinea pedis of both feet 07/26/2022    Onychomycosis 09/20/2022    Iron deficiency anemia 07/26/2023    BMI 35.0-35.9,adult 07/26/2023    Muscle weakness of upper extremity 12/28/2023     Resolved Ambulatory Problems     Diagnosis Date Noted    BMI 40.0-44.9, adult 01/19/2022    S/P laparoscopic cholecystectomy 10/27/2022    Acute cystitis with hematuria 07/26/2023    Breast pain, left 04/22/2024     Past Medical History:   Diagnosis Date    Abnormal Pap smear of cervix     Arthritis     BMI 36.0-36.9,adult 07/26/2023    Carpal tunnel syndrome     Gastritis     Respiratory distress        Pre-op Assessment    I have reviewed the NPO Status.      Review of Systems  Anesthesia Hx:              Personal Hx of Anesthesia complications, Post-Operative Nausea/Vomiting, with every anesthetic, treatment not known                    Social:  Non-Smoker       Cardiovascular:     Hypertension, well controlled                                        Pulmonary:  Pulmonary Normal                       Renal/:  Renal/ Normal                 Hepatic/GI:  Hepatic/GI Normal                 Neurological:  Neurology Normal                                      Endocrine:        Obesity / BMI > 30  Psych:   anxiety depression              Vitals:    08/23/24 1050   BP:  115/75   BP Location: Right arm   Patient Position: Lying   Pulse: 64   Resp: 17   Temp: 36.9 °C (98.4 °F)   TempSrc: Oral   SpO2: 98%   Weight: 85.4 kg (188 lb 3.2 oz)   Height: 5' (1.524 m)         Physical Exam  General: Alert, Cooperative and Well nourished    Airway:  Mallampati: II   Mouth Opening: Normal  TM Distance: Normal  Tongue: Normal  Neck ROM: Normal ROM    Dental:  Partial Dentures    Chest/Lungs:  Clear to auscultation, Normal Respiratory Rate    Heart:  Rate: Normal  Rhythm: Regular Rhythm  Sounds: Normal       Latest Reference Range & Units 08/23/24 10:58   hCG Qualitative, Urine Negative  Negative     Lab Results   Component Value Date    WBC 8.69 08/07/2024    HGB 12.7 08/07/2024    HCT 38.4 08/07/2024    MCV 95.8 (H) 08/07/2024     08/07/2024       CMP  Sodium   Date Value Ref Range Status   08/07/2024 141 136 - 145 mmol/L Final   12/15/2023 136 136 - 145 mmol/L Final     Potassium   Date Value Ref Range Status   08/07/2024 4.0 3.5 - 5.1 mmol/L Final   12/15/2023 3.9 3.5 - 5.1 mmol/L Final     Chloride   Date Value Ref Range Status   08/07/2024 105 98 - 107 mmol/L Final   12/15/2023 104 100 - 109 mmol/L Final     CO2   Date Value Ref Range Status   08/07/2024 28 22 - 29 mmol/L Final     Carbon Dioxide   Date Value Ref Range Status   12/15/2023 25 22 - 33 mmol/L Final     Blood Urea Nitrogen   Date Value Ref Range Status   08/07/2024 10.1 7.0 - 18.7 mg/dL Final   12/15/2023 11 5 - 25 mg/dL Final     Creatinine   Date Value Ref Range Status   08/07/2024 0.84 0.55 - 1.02 mg/dL Final   12/15/2023 0.73 0.57 - 1.25 mg/dL Final     Calcium   Date Value Ref Range Status   08/07/2024 9.7 8.4 - 10.2 mg/dL Final   12/15/2023 7.9 (L) 8.8 - 10.6 mg/dL Final     Albumin   Date Value Ref Range Status   08/07/2024 3.8 3.5 - 5.0 g/dL Final     Bilirubin Total   Date Value Ref Range Status   08/07/2024 0.5 <=1.5 mg/dL Final     ALP   Date Value Ref Range Status   08/07/2024 69 40 - 150 unit/L Final      AST   Date Value Ref Range Status   08/07/2024 16 5 - 34 unit/L Final     ALT   Date Value Ref Range Status   08/07/2024 10 0 - 55 unit/L Final     Anion Gap   Date Value Ref Range Status   12/15/2023 7 (L) 8 - 16 mmol/L Final     eGFR   Date Value Ref Range Status   08/07/2024 >60 mL/min/1.73/m2 Final               Anesthesia Plan  Type of Anesthesia, risks & benefits discussed:    Anesthesia Type: Gen Natural Airway  Intra-op Monitoring Plan: Standard ASA Monitors  Post Op Pain Control Plan: IV/PO Opioids PRN  Induction:  IV  Informed Consent: Informed consent signed with the Patient and all parties understand the risks and agree with anesthesia plan.  All questions answered.   ASA Score: 2  Day of Surgery Review of History & Physical: H&P Update referred to the surgeon/provider.    Ready For Surgery From Anesthesia Perspective.     .

## 2024-08-23 ENCOUNTER — HOSPITAL ENCOUNTER (OUTPATIENT)
Facility: HOSPITAL | Age: 49
Discharge: HOME | End: 2024-08-23
Attending: INTERNAL MEDICINE | Admitting: INTERNAL MEDICINE
Payer: MEDICARE

## 2024-08-23 ENCOUNTER — TELEPHONE (OUTPATIENT)
Dept: ENDOSCOPY | Facility: HOSPITAL | Age: 49
End: 2024-08-23

## 2024-08-23 ENCOUNTER — ANESTHESIA (OUTPATIENT)
Dept: ENDOSCOPY | Facility: HOSPITAL | Age: 49
End: 2024-08-23
Payer: MEDICARE

## 2024-08-23 DIAGNOSIS — R15.9 FULL INCONTINENCE OF FECES: Primary | ICD-10-CM

## 2024-08-23 DIAGNOSIS — Z12.11 SCREENING FOR COLON CANCER: ICD-10-CM

## 2024-08-23 DIAGNOSIS — R19.7 DIARRHEA, UNSPECIFIED TYPE: Primary | ICD-10-CM

## 2024-08-23 LAB
B-HCG UR QL: NEGATIVE
CTP QC/QA: YES

## 2024-08-23 PROCEDURE — 37000008 HC ANESTHESIA 1ST 15 MINUTES: Performed by: INTERNAL MEDICINE

## 2024-08-23 PROCEDURE — 63600175 PHARM REV CODE 636 W HCPCS: Performed by: NURSE ANESTHETIST, CERTIFIED REGISTERED

## 2024-08-23 PROCEDURE — 63600175 PHARM REV CODE 636 W HCPCS: Performed by: ANESTHESIOLOGY

## 2024-08-23 PROCEDURE — 27201423 OPTIME MED/SURG SUP & DEVICES STERILE SUPPLY: Performed by: INTERNAL MEDICINE

## 2024-08-23 PROCEDURE — 81025 URINE PREGNANCY TEST: CPT | Performed by: INTERNAL MEDICINE

## 2024-08-23 PROCEDURE — 45385 COLONOSCOPY W/LESION REMOVAL: CPT | Mod: PT | Performed by: INTERNAL MEDICINE

## 2024-08-23 PROCEDURE — 37000009 HC ANESTHESIA EA ADD 15 MINS: Performed by: INTERNAL MEDICINE

## 2024-08-23 PROCEDURE — 88305 TISSUE EXAM BY PATHOLOGIST: CPT | Mod: TC | Performed by: INTERNAL MEDICINE

## 2024-08-23 PROCEDURE — 25000003 PHARM REV CODE 250: Performed by: NURSE ANESTHETIST, CERTIFIED REGISTERED

## 2024-08-23 RX ORDER — PROPOFOL 10 MG/ML
VIAL (ML) INTRAVENOUS
Status: DISCONTINUED | OUTPATIENT
Start: 2024-08-23 | End: 2024-08-23

## 2024-08-23 RX ORDER — LIDOCAINE HYDROCHLORIDE 20 MG/ML
INJECTION, SOLUTION EPIDURAL; INFILTRATION; INTRACAUDAL; PERINEURAL
Status: DISCONTINUED | OUTPATIENT
Start: 2024-08-23 | End: 2024-08-23

## 2024-08-23 RX ORDER — CHOLESTYRAMINE 4 G/9G
4 POWDER, FOR SUSPENSION ORAL
Qty: 270 PACKET | Refills: 12 | Status: SHIPPED | OUTPATIENT
Start: 2024-08-23 | End: 2025-08-23

## 2024-08-23 RX ORDER — SODIUM CHLORIDE, SODIUM LACTATE, POTASSIUM CHLORIDE, CALCIUM CHLORIDE 600; 310; 30; 20 MG/100ML; MG/100ML; MG/100ML; MG/100ML
INJECTION, SOLUTION INTRAVENOUS CONTINUOUS
Status: DISCONTINUED | OUTPATIENT
Start: 2024-08-23 | End: 2024-08-23 | Stop reason: HOSPADM

## 2024-08-23 RX ADMIN — PROPOFOL 50 MG: 10 INJECTION, EMULSION INTRAVENOUS at 01:08

## 2024-08-23 RX ADMIN — SODIUM CHLORIDE, POTASSIUM CHLORIDE, SODIUM LACTATE AND CALCIUM CHLORIDE: 600; 310; 30; 20 INJECTION, SOLUTION INTRAVENOUS at 12:08

## 2024-08-23 RX ADMIN — PROPOFOL 20 MG: 10 INJECTION, EMULSION INTRAVENOUS at 01:08

## 2024-08-23 RX ADMIN — PROPOFOL 130 MG: 10 INJECTION, EMULSION INTRAVENOUS at 12:08

## 2024-08-23 RX ADMIN — SODIUM CHLORIDE, POTASSIUM CHLORIDE, SODIUM LACTATE AND CALCIUM CHLORIDE: 600; 310; 30; 20 INJECTION, SOLUTION INTRAVENOUS at 11:08

## 2024-08-23 RX ADMIN — LIDOCAINE HYDROCHLORIDE 100 MG: 20 INJECTION, SOLUTION EPIDURAL; INFILTRATION; INTRACAUDAL; PERINEURAL at 12:08

## 2024-08-23 NOTE — TELEPHONE ENCOUNTER
Cholestyramine prescription called in to [facility] pharmacy on behalf of Dr. Quinones

## 2024-08-23 NOTE — PROVATION PATIENT INSTRUCTIONS
Discharge Summary/Instructions after an Endoscopic Procedure  Patient Name: Beverly Lee  Patient MRN: [id number]  Patient YOB: 1975 Friday, August 23, 2024  KIT Quinones MD  Dear patient,  As a result of recent federal legislation (The Federal Cures Act), you may   receive lab or pathology results from your procedure in your [facility]   account before your physician is able to contact you. Your physician or   their representative will relay the results to you with their   recommendations at their soonest availability.  Thank you,  RESTRICTIONS:  During your procedure today, you received medications for sedation.  These   medications may affect your judgment, balance and coordination.  Therefore,   for 24 hours, you have the following restrictions:   - DO NOT drive a car, operate machinery, make legal/financial decisions,   sign important papers or drink alcohol.    ACTIVITY:  Today: no heavy lifting, straining or running due to procedural   sedation/anesthesia.  The following day: return to full activity including work.  DIET:  Eat and drink normally unless instructed otherwise.     TREATMENT FOR COMMON SIDE EFFECTS:  - Mild abdominal pain, nausea, belching, bloating or excessive gas:  rest,   eat lightly and use a heating pad.  - Sore Throat: treat with throat lozenges and/or gargle with warm salt   water.  - Because air was used during the procedure, expelling large amounts of air   from your rectum or belching is normal.  - If a bowel prep was taken, you may not have a bowel movement for 1-3 days.    This is normal.  SYMPTOMS TO WATCH FOR AND REPORT TO YOUR PHYSICIAN:  1. Abdominal pain or bloating, other than gas cramps.  2. Chest pain.  3. Back pain.  4. Signs of infection such as: chills or fever occurring within 24 hours   after the procedure.  5. Rectal bleeding, which would show as bright red, maroon, or black stools.   (A tablespoon of blood from the rectum is not serious, especially if    hemorrhoids are present.)  6. Vomiting.  7. Weakness or dizziness.  GO DIRECTLY TO THE NEAREST EMERGENCY ROOM IF YOU HAVE ANY OF THE FOLLOWING:      Difficulty breathing              Chills and/or fever over 101 F   Persistent vomiting and/or vomiting blood   Severe abdominal pain   Severe chest pain   Black, tarry stools   Bleeding- more than one tablespoon   Any other symptom or condition that you feel may need urgent attention  Your doctor recommends these additional instructions:  If any biopsies were taken, your doctors clinic will contact you in 1 to 2   weeks with any results.  - Resume previous diet.   - Continue present medications.   - Repeat colonoscopy for surveillance based on pathology results.   - Discharge patient to home (via wheelchair).  For questions, problems or results please call your physician - KIT Quinones MD at Work:  [phone].  [facility] , EMERGENCY ROOM PHONE NUMBER: [phone]  IF A COMPLICATION OR EMERGENCY SITUATION ARISES AND YOU ARE UNABLE TO REACH   YOUR PHYSICIAN - GO DIRECTLY TO THE EMERGENCY ROOM.  KIT Quinones MD  8/23/2024 1:28:44 PM  This report has been verified and signed electronically.  Dear patient,  As a result of recent federal legislation (The Federal Cures Act), you may   receive lab or pathology results from your procedure in your [facility]   account before your physician is able to contact you. Your physician or   their representative will relay the results to you with their   recommendations at their soonest availability.  Thank you,  PROVATION

## 2024-08-23 NOTE — ANESTHESIA POSTPROCEDURE EVALUATION
Anesthesia Post Evaluation    Patient: Beverly Lee    Procedure(s) Performed: Procedure(s) (LRB):  COLONOSCOPY, WITH POLYPECTOMY USING SNARE (N/A)  COLONOSCOPY, WITH POLYPECTOMY USING HOT BIOPSY FORCEPS (N/A)    Final Anesthesia Type: general      Patient location during evaluation: GI PACU  Patient participation: Yes- Able to Participate  Level of consciousness: awake and alert  Post-procedure vital signs: reviewed and stable  Pain management: adequate  Airway patency: patent    PONV status at discharge: No PONV  Anesthetic complications: no      Cardiovascular status: hemodynamically stable  Respiratory status: unassisted, spontaneous ventilation and room air  Hydration status: euvolemic  Follow-up not needed.              Vitals Value Taken Time   /75 08/23/24 1050   Temp 36.9 °C (98.4 °F) 08/23/24 1050   Pulse 64 08/23/24 1050   Resp 17 08/23/24 1050   SpO2 98 % 08/23/24 1050         No case tracking events are documented in the log.      Pain/Vero Score: No data recorded

## 2024-08-23 NOTE — PLAN OF CARE
Pt back to baseline. A&O X4. Follows commands. Tolerating oral fluids.performs self care and dressed self. Pt and family educated on post op care. Discharge instructions handout given to patient. All questions and concerns were addressed. Dr. Quinones at patient bedside

## 2024-08-23 NOTE — PLAN OF CARE
pt arrived to room via stretcher. pt awake, alert, and oriented x4. follows commands. moves all extremities. vital signs wnl and on room air. tolerating oral fluids. denies pain at this time

## 2024-08-23 NOTE — DISCHARGE SUMMARY
[facility]  Discharge Summary    Admit Date: 2024  Discharge Date: 2024  Admitting Physician: No att. providers found  Consulting Physicians(s): none    Admission HPI: no prior screening, long standing fecal incontinence      Hospital Course:   colonoscopy    Procedures Performed: colonoscopy polypectomy    Final Diagnoses: There are no hospital problems to display for this patient.      Discharge Condition: good    Disposition: home    Follow-Up Plan: biopsy pending    Discharge Instructions:   Activity: as before  Diet: as before  Wound Care: na    Discharge Medications:  Discharge Medication List as of 2024  1:44 PM        CONTINUE these medications which have NOT CHANGED    Details   calcium-vitamin D3 (OS-FIGUEROA 500 + D3) 500 mg-5 mcg (200 unit) per tablet Take 1 tablet by mouth., Historical Med      cetirizine (ZYRTEC) 10 MG tablet Take 10 mg by mouth daily as needed., Starting e 2022, Historical Med      clindamycin (CLEOCIN T) 1 % Swab SMARTSI Topical Every Morning, Historical Med      dextroamphetamine-amphetamine (ADDERALL XR) 30 MG 24 hr capsule Starting Mon 2022, Historical Med      LINZESS 145 mcg Cap capsule Take 145 mcg by mouth once daily., Starting Thu 2022, Historical Med      MV-MN-IRON FUM-FA-OMEGA3,6,9#3 ORAL Take 1 tablet by mouth once daily., Historical Med      omeprazole (PRILOSEC) 20 MG capsule Take 20 mg by mouth once daily., Starting Mon 10/10/2022, Historical Med      aluminum & magnesium hydroxide-simethicone (MYLANTA MAX STRENGTH) 400-400-40 mg/5 mL suspension Take 5 mLs by mouth every 6 (six) hours as needed., Starting Sat 10/8/2022, Until Sat 10/22/2022 at 2359, Historical Med      clotrimazole (LOTRIMIN) 1 % cream Apply topically 2 (two) times daily., Starting 2022, Normal      diclofenac sodium (VOLTAREN) 1 % Gel APPLY TOPICALLY TO THE AFFECTED AREA FOUR TIMES DAILY AS NEEDED FOR PAIN, Historical Med      fluticasone propionate  (FLONASE) 50 mcg/actuation nasal spray prn, Starting Mon 11/15/2021, Historical Med      ibuprofen (ADVIL,MOTRIN) 800 MG tablet TAKE 1 TABLET(800 MG) BY MOUTH THREE TIMES DAILY WITH FOOD AS NEEDED FOR PAIN, Normal      LIDOcaine-diphenhyd-Al-mag-sim (FIRST-MOUTHWASH BLM) -764-40 mg/30mL mouthwash suspension Swish and spit 10 mLs 3 (three) times daily., Starting Thu 12/28/2023, Normal      nystatin-triamcinolone (MYCOLOG II) cream Apply topically 2 (two) times daily as needed., Starting Thu 8/3/2023, Historical Med      ondansetron (ZOFRAN-ODT) 4 MG TbDL Take 4 mg by mouth every 8 (eight) hours as needed., Starting Fri 5/27/2022, Historical Med      PAXIL 30 mg tablet Take 30 mg by mouth every morning., Starting Tue 3/22/2022, Historical Med      polyethylene glycol (MOVIPREP) 100-7.5-2.691 gram solution Take as directed prior to colonoscopy, Normal      sucralfate (CARAFATE) 1 gram tablet Take 1 g by mouth 4 (four) times daily., Starting Mon 10/10/2022, Historical Med      traZODone (DESYREL) 50 MG tablet Take  mg by mouth nightly., Starting Tue 3/22/2022, Historical Med             SUKUMAR Quinones MD   [facility] PGY1  08/23/2024 3:21 PM

## 2024-08-23 NOTE — TRANSFER OF CARE
Anesthesia Transfer of Care Note    Patient: Beverly Lee    Procedure(s) Performed: Procedure(s) (LRB):  COLONOSCOPY, WITH POLYPECTOMY USING SNARE (N/A)  COLONOSCOPY, WITH POLYPECTOMY USING HOT BIOPSY FORCEPS (N/A)    Patient location: GI    Anesthesia Type: general    Post pain: adequate analgesia    Post assessment: no apparent anesthetic complications    Post vital signs: stable    Level of consciousness: sedated    Nausea/Vomiting: no nausea/vomiting    Complications: none    Transfer of care protocol was followed      Last vitals: Visit Vitals  /75 (BP Location: Right arm, Patient Position: Lying)   Pulse 64   Temp 36.9 °C (98.4 °F) (Oral)   Resp 17   Ht 5' (1.524 m)   Wt 85.4 kg (188 lb 3.2 oz)   LMP 07/19/2024 (Exact Date)   SpO2 98%   Breastfeeding No   BMI 36.76 kg/m²

## 2024-08-23 NOTE — H&P
History and Physical    Patient Name: Beverly Lee  MRN: [id number]  : 1975  Date of Procedure:  2024  Referring Physician: Lauren Watson NP  Primary Physician: Lauren Watson NP  Procedure Physician: Dr. Joseph    Procedure - colonoscopy  ASA - per anesthesia  Mallampati - per anesthesia  History of Anesthesia problems - no  Family history Anesthesia problems -  no   Plan of anesthesia - General    Diagnosis: history of colon polyps  Chief Complaint: Same as above    HPI: Patient is an 49 y.o. female is here for the above. She has 18 year history of fecal incontinence. Patient denies any recent nausea, vomiting, abdominal pain, abnormal bowel movements, or unintentional weight loss. Denies any family history of colon cancer or polyps.     Last colonoscopy:      ROS:  Constitutional: No fevers, chills, No weight loss  CV: No chest pain  Pulm: No cough, No shortness of breath  GI: see HPI    Medical History:   Past Medical History:   Diagnosis Date    Abnormal Pap smear of cervix     Acute cystitis with hematuria 2023    Arthritis     BMI 36.0-36.9,adult 2023    Breast pain, left 2024    Carpal tunnel syndrome     Gastritis     Hypertension     Respiratory distress      Surgical History:   Past Surgical History:   Procedure Laterality Date     SECTION      CHOLECYSTECTOMY      gastric sleeve  2022    HAND SURGERY      LAPAROSCOPIC CHOLECYSTECTOMY  10/13/2022    NERVE REPAIR      PENECTOMY, TOTAL, RADICAL, WITH LYMPHADENECTOMY      RECTAL SURGERY      REPAIR OF LIGAMENT OF ANKLE      Right leg vein procedure   2024    TONSILLECTOMY      VAGINA SURGERY      vaginal fistula    WISDOM TOOTH EXTRACTION       Family History:   Family History   Problem Relation Name Age of Onset    COPD Mother      Arthritis Mother      Lung cancer Mother      Hypertension Mother      Hyperlipidemia Father      Rheum arthritis Father      Colon cancer Father      Rheum arthritis  Sister      ALS Paternal Uncle      Parkinsonism Paternal Uncle     .  Social History:   Social History     Socioeconomic History    Marital status: Single    Number of children: 3   Tobacco Use    Smoking status: Never    Smokeless tobacco: Never   Substance and Sexual Activity    Alcohol use: Not Currently    Drug use: Never    Sexual activity: Yes     Partners: Male   Social History Narrative    ** Merged History Encounter **          Social Determinants of Health     Financial Resource Strain: Low Risk  (8/7/2024)    Overall Financial Resource Strain (CARDIA)     Difficulty of Paying Living Expenses: Not very hard   Food Insecurity: No Food Insecurity (8/7/2024)    Hunger Vital Sign     Worried About Running Out of Food in the Last Year: Never true     Ran Out of Food in the Last Year: Never true   Transportation Needs: No Transportation Needs (8/7/2024)    TRANSPORTATION NEEDS     Transportation : No   Physical Activity: Sufficiently Active (8/7/2024)    Exercise Vital Sign     Days of Exercise per Week: 3 days     Minutes of Exercise per Session: 50 min   Stress: Stress Concern Present (8/7/2024)    [employer] [place] [employer] - Occupational Stress Questionnaire     Feeling of Stress : Very much   Housing Stability: Low Risk  (8/7/2024)    Housing Stability Vital Sign     Unable to Pay for Housing in the Last Year: No     Homeless in the Last Year: No       Review of patient's allergies indicates:   Allergen Reactions    Macrolide antibiotics     Nitrofurantoin      Other reaction(s): Palpitations  Other reaction(s): Palpitations    Nitrofurantoin monohyd/m-cryst Palpitations       Medications:   Medications Prior to Admission   Medication Sig Dispense Refill Last Dose    calcium-vitamin D3 (OS-FIGUEROA 500 + D3) 500 mg-5 mcg (200 unit) per tablet Take 1 tablet by mouth.   Taking    cetirizine (ZYRTEC) 10 MG tablet Take 10 mg by mouth daily as needed.   Taking    clindamycin (CLEOCIN T) 1 % Swab  SMARTSI Topical Every Morning   Taking    dextroamphetamine-amphetamine (ADDERALL XR) 30 MG 24 hr capsule    Taking    LINZESS 145 mcg Cap capsule Take 145 mcg by mouth once daily.   Taking    MV-MN-IRON FUM-FA-OMEGA3,6,9#3 ORAL Take 1 tablet by mouth once daily.   Taking    omeprazole (PRILOSEC) 20 MG capsule Take 20 mg by mouth once daily.   Taking    aluminum & magnesium hydroxide-simethicone (MYLANTA MAX STRENGTH) 400-400-40 mg/5 mL suspension Take 5 mLs by mouth every 6 (six) hours as needed.       clotrimazole (LOTRIMIN) 1 % cream Apply topically 2 (two) times daily. 60 g 1     diclofenac sodium (VOLTAREN) 1 % Gel APPLY TOPICALLY TO THE AFFECTED AREA FOUR TIMES DAILY AS NEEDED FOR PAIN       fluticasone propionate (FLONASE) 50 mcg/actuation nasal spray prn       ibuprofen (ADVIL,MOTRIN) 800 MG tablet TAKE 1 TABLET(800 MG) BY MOUTH THREE TIMES DAILY WITH FOOD AS NEEDED FOR PAIN 90 tablet 2     LIDOcaine-diphenhyd-Al-mag-sim (FIRST-MOUTHWASH BLM) -797-40 mg/30mL mouthwash suspension Swish and spit 10 mLs 3 (three) times daily. 90 mL 0     nystatin-triamcinolone (MYCOLOG II) cream Apply topically 2 (two) times daily as needed.       ondansetron (ZOFRAN-ODT) 4 MG TbDL Take 4 mg by mouth every 8 (eight) hours as needed.       PAXIL 30 mg tablet Take 30 mg by mouth every morning.       polyethylene glycol (MOVIPREP) 100-7.5-2.691 gram solution Take as directed prior to colonoscopy 1 kit 0     sucralfate (CARAFATE) 1 gram tablet Take 1 g by mouth 4 (four) times daily.       traZODone (DESYREL) 50 MG tablet Take  mg by mouth nightly.        Physical Exam:  Vital Signs: There were no vitals filed for this visit.  LMP 2024 (Exact Date)     General: NAD  Lungs: NWOB on RA. Symmetric chest rise and fall  Heart: RR  Abdomen: Soft, NT, ND    Labs:  Lab Results   Component Value Date    WBC 8.69 2024    HGB 12.7 2024    HCT 38.4 2024    MCV 95.8 (H) 2024     2024      Lab Results   Component Value Date    INR 1.7 12/15/2023     Lab Results   Component Value Date     08/07/2024    K 4.0 08/07/2024    CO2 28 08/07/2024    BUN 10.1 08/07/2024    CREATININE 0.84 08/07/2024    LABPROT 7.2 08/07/2024    ALBUMIN 3.8 08/07/2024    BILITOT 0.5 08/07/2024    ALKPHOS 69 08/07/2024    ALT 10 08/07/2024    AST 16 08/07/2024       Assessment and Plan:   Patient is a 49 with PMH of obesity, HTN, anxiety who presents today for colonoscopy.     - After discussing risks, benefits, and alternatives, patient elects to proceed with colonoscopy, and any other indicated procedures. All questions and concerns addressed. Written and verbal consent obtained.     Susanne Quan MD  [employer] General Surgery, PGY2

## 2024-08-26 VITALS
DIASTOLIC BLOOD PRESSURE: 81 MMHG | HEIGHT: 60 IN | TEMPERATURE: 98 F | WEIGHT: 188.19 LBS | RESPIRATION RATE: 16 BRPM | OXYGEN SATURATION: 100 % | SYSTOLIC BLOOD PRESSURE: 141 MMHG | BODY MASS INDEX: 36.95 KG/M2 | HEART RATE: 59 BPM

## 2024-08-27 LAB
ESTROGEN SERPL-MCNC: NORMAL PG/ML
INSULIN SERPL-ACNC: NORMAL U[IU]/ML
LAB AP CLINICAL INFORMATION: NORMAL
LAB AP GROSS DESCRIPTION: NORMAL
LAB AP REPORT FOOTNOTES: NORMAL
T3RU NFR SERPL: NORMAL %

## 2024-08-30 ENCOUNTER — TELEPHONE (OUTPATIENT)
Dept: INTERNAL MEDICINE | Facility: CLINIC | Age: 49
End: 2024-08-30
Payer: MEDICARE

## 2024-08-30 NOTE — TELEPHONE ENCOUNTER
----- Message from [facility] sent at 8/30/2024 11:25 AM CDT -----  .Type:  Patient Requesting Referral    Who Called: pt    Does the patient already have the specialty appointment scheduled?: no    If yes, what is the date of that appointment?: no    Referral to What Specialty: Plastic Surgeon     Reason for Referral: breast reduction/ lift    Does the patient want the referral with a specific physician?: Dr. Lal fax# [phone] or call [phone]    Is the specialist an [facility]?: non    Patient Requesting a Response?: yes    Would the patient rather a call back or a response via [facility]?      Best Call Back Number: [phone]    Additional Information:  pt ask to send this referral thanks

## 2024-08-30 NOTE — TELEPHONE ENCOUNTER
Called pt for clarification. Pt stated she would like a referral to Plastic Surgeon listed below for breast/ reduction.Please advise.      Dr. Lal   Fax:[phone]   Ph:[phone]

## 2024-09-04 ENCOUNTER — OFFICE VISIT (OUTPATIENT)
Dept: INTERNAL MEDICINE | Facility: CLINIC | Age: 49
End: 2024-09-04
Payer: MEDICARE

## 2024-09-04 DIAGNOSIS — N62 MACROMASTIA: Primary | ICD-10-CM

## 2024-09-04 DIAGNOSIS — M54.6 CHRONIC BILATERAL THORACIC BACK PAIN: ICD-10-CM

## 2024-09-04 DIAGNOSIS — G89.29 CHRONIC BILATERAL THORACIC BACK PAIN: ICD-10-CM

## 2024-09-04 PROCEDURE — 1159F MED LIST DOCD IN RCRD: CPT | Mod: CPTII,95,, | Performed by: NURSE PRACTITIONER

## 2024-09-04 PROCEDURE — 99213 OFFICE O/P EST LOW 20 MIN: CPT | Mod: 95,,, | Performed by: NURSE PRACTITIONER

## 2024-09-04 RX ORDER — FINASTERIDE 5 MG/1
5 TABLET, FILM COATED ORAL DAILY
COMMUNITY
Start: 2024-08-27 | End: 2025-02-23

## 2024-09-04 RX ORDER — MINOXIDIL 2.5 MG/1
1.25 TABLET ORAL DAILY
COMMUNITY
Start: 2024-08-27 | End: 2025-02-23

## 2024-09-04 NOTE — ASSESSMENT & PLAN NOTE
Pt endorsing b/l shoulder pain and mid back pain s/t breast size. Requesting referral for evaluation for reduction. She has successfully lost weight after gastric sleeve surgery May 2022 and continues to lose and maintaining wt loss. Referral ordered as requested to Dr. Lal.

## 2024-09-04 NOTE — PROGRESS NOTES
The patient location is: home  The chief complaint leading to consultation is: referral for breast reduction    Visit type: audiovisual    Face to Face time with patient: 5 minutes   9 minutes of total time spent on the encounter, which includes face to face time and non-face to face time preparing to see the patient (eg, review of tests), Obtaining and/or reviewing separately obtained history, Documenting clinical information in the electronic or other health record, Independently interpreting results (not separately reported) and communicating results to the patient/family/caregiver, or Care coordination (not separately reported).         Each patient to whom he or she provides medical services by telemedicine is:  (1) informed of the relationship between the physician and patient and the respective role of any other health care provider with respect to management of the patient; and (2) notified that he or she may decline to receive medical services by telemedicine and may withdraw from such care at any time.    Notes:   Pt with history of HTN, ZEV, family history of colon cancer (father), anal sphincter incontinence, anxiety/ depression, AR, obesity and h/o gastric sleeve May 2022 requesting referral for plastic surgery for breast reduction. She is endorsing b/l shoulder pain from bra strap. Having to put tighter and causing indentations in shoulders. Also endorsing mid back pain.  Currently wearing 40 DD. She has maintained and continues to lose weight from gastric sleeve May 2022. Doing well overall. No other concerns.     Other providers   [facility] G[facility]  Dr. Collins- Orthopedics  Dr. Beaver- Bariatrics  Neema Valerio- Mental Health     Medication List with Changes/Refills   Current Medications    ALUMINUM & MAGNESIUM HYDROXIDE-SIMETHICONE (MYLANTA MAX STRENGTH) 400-400-40 MG/5 ML SUSPENSION    Take 5 mLs by mouth every 6 (six) hours as needed.    CALCIUM-VITAMIN D3 (OS-FIGUEROA 500 + D3) 500 MG-5 MCG (200 UNIT) PER  TABLET    Take 1 tablet by mouth.    CETIRIZINE (ZYRTEC) 10 MG TABLET    Take 10 mg by mouth daily as needed.    CHOLESTYRAMINE (QUESTRAN) 4 GRAM PACKET    Take 1 packet (4 g total) by mouth 3 (three) times daily with meals.    CLINDAMYCIN (CLEOCIN T) 1 % SWAB    SMARTSI Topical Every Morning    CLOTRIMAZOLE (LOTRIMIN) 1 % CREAM    Apply topically 2 (two) times daily.    DEXTROAMPHETAMINE-AMPHETAMINE (ADDERALL XR) 30 MG 24 HR CAPSULE        DICLOFENAC SODIUM (VOLTAREN) 1 % GEL    APPLY TOPICALLY TO THE AFFECTED AREA FOUR TIMES DAILY AS NEEDED FOR PAIN    FINASTERIDE (PROSCAR) 5 MG TABLET    Take 5 mg by mouth once daily.    FLUTICASONE PROPIONATE (FLONASE) 50 MCG/ACTUATION NASAL SPRAY    prn    IBUPROFEN (ADVIL,MOTRIN) 800 MG TABLET    TAKE 1 TABLET(800 MG) BY MOUTH THREE TIMES DAILY WITH FOOD AS NEEDED FOR PAIN    LIDOCAINE-DIPHENHYD-AL-MAG-SIM (FIRST-MOUTHWASH BLM) -069-40 MG/30ML MOUTHWASH SUSPENSION    Swish and spit 10 mLs 3 (three) times daily.    LINZESS 145 MCG CAP CAPSULE    Take 145 mcg by mouth once daily.    MINOXIDIL (LONITEN) 2.5 MG TABLET    Take 1.25 mg by mouth once daily.    MV-MN-IRON FUM-FA-OMEGA3,6,9#3 ORAL    Take 1 tablet by mouth once daily.    NYSTATIN-TRIAMCINOLONE (MYCOLOG II) CREAM    Apply topically 2 (two) times daily as needed.    OMEPRAZOLE (PRILOSEC) 20 MG CAPSULE    Take 20 mg by mouth once daily.    ONDANSETRON (ZOFRAN-ODT) 4 MG TBDL    Take 4 mg by mouth every 8 (eight) hours as needed.    PAXIL 30 MG TABLET    Take 30 mg by mouth every morning.    SUCRALFATE (CARAFATE) 1 GRAM TABLET    Take 1 g by mouth 4 (four) times daily.    TRAZODONE (DESYREL) 50 MG TABLET    Take  mg by mouth nightly.   Discontinued Medications    POLYETHYLENE GLYCOL (MOVIPREP) 100-7.5-2.691 GRAM SOLUTION    Take as directed prior to colonoscopy         Problem List Items Addressed This Visit          Renal/    Macromastia - Primary    Current Assessment & Plan     Pt endorsing b/l shoulder  pain and mid back pain s/t breast size. Requesting referral for evaluation for reduction. She has successfully lost weight after gastric sleeve surgery May 2022 and continues to lose and maintaining wt loss. Referral ordered as requested to Dr. Lal.         Relevant Orders    Ambulatory referral/consult to Plastic Surgery       Orthopedic    Chronic bilateral thoracic back pain       Follow up if symptoms worsen or fail to improve, for Keep f/u as scheduled.

## 2024-09-19 ENCOUNTER — OFFICE VISIT (OUTPATIENT)
Dept: ORTHOPEDICS | Facility: CLINIC | Age: 49
End: 2024-09-19
Payer: MEDICARE

## 2024-09-19 VITALS
HEART RATE: 74 BPM | WEIGHT: 190 LBS | BODY MASS INDEX: 37.3 KG/M2 | HEIGHT: 60 IN | DIASTOLIC BLOOD PRESSURE: 83 MMHG | SYSTOLIC BLOOD PRESSURE: 123 MMHG

## 2024-09-19 DIAGNOSIS — M17.11 PRIMARY OSTEOARTHRITIS OF RIGHT KNEE: Primary | ICD-10-CM

## 2024-09-19 NOTE — PROGRESS NOTES
[facility]opaedic Clinic  Orthopedic Clinic Note      Chief Complaint:   Chief Complaint   Patient presents with    Appointment     Patient here today for f/u right knee Synvisc-one, received on 05/17/2024. She feels like the synvisc did help with her knee pain. She only feels pain with over use or extended activity. No pain during a normal active day.      Referring Physician: No ref. provider found      History of Present Illness:    This is a 48-year-old female well known to our clinic who presents for re-evaluation of right knee pain.  She has been treated extensively for right knee osteoarthritis.  This knee pain is moderate in severity.  Patient states pain is worse with ambulation and activity, especially squats and lunges.  Patient states pain is global.  Patient has been treated previously with prescribed anti-inflammatories and topical medications, multiple corticosteroid and viscosupplementation injections, and significant weight loss.  She received a right knee viscosupplementation injection approximately 6 months ago and reports that it alleviated most of her symptoms.  She would like to proceed with repeat viscosupplementation injection today.  She also complains of new onset left wrist pain worsening over the last few weeks.  Significant pain over the radial aspect of the wrist with no associated injury or trauma.  Pain worsens with ulnar deviation of the wrist.  She has attempted her previously prescribed ibuprofen and rest with no improvement in her symptoms.  09/19/24 she comes in with continued symptoms today.  She wants to schedule another Synvisc injection.      Past Medical History:   Diagnosis Date    Abnormal Pap smear of cervix     Acute cystitis with hematuria 07/26/2023    Arthritis     BMI 36.0-36.9,adult 07/26/2023    Breast pain, left 04/22/2024    Carpal tunnel syndrome     Gastritis     Hypertension     Respiratory distress        Past Surgical History:   Procedure Laterality Date      SECTION      CHOLECYSTECTOMY      COLONOSCOPY, WITH POLYPECTOMY USING HOT BIOPSY FORCEPS N/A 2024    Procedure: COLONOSCOPY, WITH POLYPECTOMY USING HOT BIOPSY FORCEPS;  Surgeon: SUKUMAR Quinones MD;  Location: [facility] ENDOSCOPY;  Service: Gastroenterology;  Laterality: N/A;  Cold forceps    COLONOSCOPY, WITH POLYPECTOMY USING SNARE N/A 2024    Procedure: COLONOSCOPY, WITH POLYPECTOMY USING SNARE;  Surgeon: SUKUMAR Quinones MD;  Location: [facility] ENDOSCOPY;  Service: Gastroenterology;  Laterality: N/A;    gastric sleeve  2022    HAND SURGERY      LAPAROSCOPIC CHOLECYSTECTOMY  10/13/2022    NERVE REPAIR      PENECTOMY, TOTAL, RADICAL, WITH LYMPHADENECTOMY      RECTAL SURGERY      REPAIR OF LIGAMENT OF ANKLE      Right leg vein procedure   2024    TONSILLECTOMY      VAGINA SURGERY      vaginal fistula    WISDOM TOOTH EXTRACTION         Current Outpatient Medications   Medication Sig    calcium-vitamin D3 (OS-FIGUEROA 500 + D3) 500 mg-5 mcg (200 unit) per tablet Take 1 tablet by mouth.    cetirizine (ZYRTEC) 10 MG tablet Take 10 mg by mouth daily as needed.    cholestyramine (QUESTRAN) 4 gram packet Take 1 packet (4 g total) by mouth 3 (three) times daily with meals.    clindamycin (CLEOCIN T) 1 % Swab SMARTSI Topical Every Morning    clotrimazole (LOTRIMIN) 1 % cream Apply topically 2 (two) times daily.    dextroamphetamine-amphetamine (ADDERALL XR) 30 MG 24 hr capsule     diclofenac sodium (VOLTAREN) 1 % Gel APPLY TOPICALLY TO THE AFFECTED AREA FOUR TIMES DAILY AS NEEDED FOR PAIN    finasteride (PROSCAR) 5 mg tablet Take 5 mg by mouth once daily.    fluticasone propionate (FLONASE) 50 mcg/actuation nasal spray prn    ibuprofen (ADVIL,MOTRIN) 800 MG tablet TAKE 1 TABLET(800 MG) BY MOUTH THREE TIMES DAILY WITH FOOD AS NEEDED FOR PAIN    LIDOcaine-diphenhyd-Al-mag-sim (FIRST-MOUTHWASH BLM) -661-40 mg/30mL mouthwash suspension Swish and spit 10 mLs 3 (three) times daily.    LINZESS 145 mcg Cap  capsule Take 145 mcg by mouth once daily.    minoxidiL (LONITEN) 2.5 MG tablet Take 1.25 mg by mouth once daily.    MV-MN-IRON FUM-FA-OMEGA3,6,9#3 ORAL Take 1 tablet by mouth once daily.    nystatin-triamcinolone (MYCOLOG II) cream Apply topically 2 (two) times daily as needed.    omeprazole (PRILOSEC) 20 MG capsule Take 20 mg by mouth once daily.    ondansetron (ZOFRAN-ODT) 4 MG TbDL Take 4 mg by mouth every 8 (eight) hours as needed.    PAXIL 30 mg tablet Take 30 mg by mouth every morning.    sucralfate (CARAFATE) 1 gram tablet Take 1 g by mouth 4 (four) times daily.    traZODone (DESYREL) 50 MG tablet Take  mg by mouth nightly.    aluminum & magnesium hydroxide-simethicone (MYLANTA MAX STRENGTH) 400-400-40 mg/5 mL suspension Take 5 mLs by mouth every 6 (six) hours as needed.     No current facility-administered medications for this visit.       Review of patient's allergies indicates:   Allergen Reactions    Macrolide antibiotics     Nitrofurantoin      Other reaction(s): Palpitations  Other reaction(s): Palpitations    Nitrofurantoin monohyd/m-cryst Palpitations       Family History   Problem Relation Name Age of Onset    COPD Mother      Arthritis Mother      Lung cancer Mother      Hypertension Mother      Hyperlipidemia Father      Rheum arthritis Father      Colon cancer Father      Rheum arthritis Sister      ALS Paternal Uncle      Parkinsonism Paternal Uncle         Social History     Socioeconomic History    Marital status: Single    Number of children: 3   Tobacco Use    Smoking status: Never    Smokeless tobacco: Never   Substance and Sexual Activity    Alcohol use: Not Currently    Drug use: Never    Sexual activity: Yes     Partners: Male   Social History Narrative    ** Merged History Encounter **          Social Determinants of Health     Financial Resource Strain: Low Risk  (9/4/2024)    Overall Financial Resource Strain (CARDIA)     Difficulty of Paying Living Expenses: Not hard at all    Food Insecurity: No Food Insecurity (9/4/2024)    Hunger Vital Sign     Worried About Running Out of Food in the Last Year: Never true     Ran Out of Food in the Last Year: Never true   Transportation Needs: No Transportation Needs (8/7/2024)    TRANSPORTATION NEEDS     Transportation : No   Physical Activity: Sufficiently Active (8/7/2024)    Exercise Vital Sign     Days of Exercise per Week: 3 days     Minutes of Exercise per Session: 50 min   Stress: Stress Concern Present (8/7/2024)    [employer] [place] [employer] - Occupational Stress Questionnaire     Feeling of Stress : Very much   Housing Stability: Low Risk  (9/4/2024)    Housing Stability Vital Sign     Unable to Pay for Housing in the Last Year: No     Homeless in the Last Year: No           Review of Systems:  All review of systems negative except for those stated in the HPI.    Examination:    Vital Signs:    Vitals:    09/19/24 0841   BP: 123/83   Pulse: 74   Weight: 86.2 kg (190 lb)   Height: 5' (1.524 m)           Body mass index is 37.11 kg/m².    Physical Examination:  General: Well-developed, well-nourished.  Neuro: Alert and oriented x 3.  Psych: Normal mood and affect.  Card: Regular rate and rhythm  Resp: Unlabored and quiet.    Right knee Exam:  No obvious deformity. Range of motion from 0-130 degrees. Increased subluxation of the kneecap medially and laterally greater than 1 cm. Negative patella tendon tenderness. Negative J sign. Negative Lachman and anterior drawer test. Negative posterior drawer test. Negative varus and valgus stress test. Negative medial joint line tenderness. Negative lateral joint line tenderness. 4/5 strength and normal skin appearance. Sensibility normal.      Imaging: Four views of the right knee demonstrate degenerative changes with joint space narrowing, particularly in the medial aspect of the tibial femoral compartment and the patellofemoral compartment of the knee.      Assessment: Primary  osteoarthritis of right knee  -     Prior authorization Order      Plan:  Plan to proceed with repeat right knee viscosupplementation injection upon follow-up.  She verbalized understanding of the plan of care with no further questions.             No follow-ups on file.      DISCLAIMER: This note may have been dictated using voice recognition software and may contain grammatical errors.     NOTE: Consult report sent to referring provider via [employer] EMR.

## 2024-09-23 ENCOUNTER — TELEPHONE (OUTPATIENT)
Dept: ENDOSCOPY | Facility: HOSPITAL | Age: 49
End: 2024-09-23
Payer: MEDICARE

## 2024-09-23 NOTE — TELEPHONE ENCOUNTER
----- Message from SUKUMAR Quinones MD sent at 9/20/2024  1:18 PM CDT -----  Pathology shows polyps were adenoma, which is pre-cancerous but no cancer. Patient will need a follow up colonoscopy in _3-5__  years.

## 2024-10-01 ENCOUNTER — OFFICE VISIT (OUTPATIENT)
Dept: INTERNAL MEDICINE | Facility: CLINIC | Age: 49
End: 2024-10-01
Payer: MEDICARE

## 2024-10-01 VITALS
OXYGEN SATURATION: 100 % | TEMPERATURE: 98 F | SYSTOLIC BLOOD PRESSURE: 122 MMHG | WEIGHT: 189 LBS | BODY MASS INDEX: 37.11 KG/M2 | DIASTOLIC BLOOD PRESSURE: 82 MMHG | HEIGHT: 60 IN | RESPIRATION RATE: 18 BRPM | HEART RATE: 76 BPM

## 2024-10-01 DIAGNOSIS — Z23 IMMUNIZATION DUE: ICD-10-CM

## 2024-10-01 DIAGNOSIS — H92.02 LEFT EAR PAIN: Primary | ICD-10-CM

## 2024-10-01 PROCEDURE — 99214 OFFICE O/P EST MOD 30 MIN: CPT | Mod: S$PBB,,, | Performed by: NURSE PRACTITIONER

## 2024-10-01 PROCEDURE — 3079F DIAST BP 80-89 MM HG: CPT | Mod: CPTII,,, | Performed by: NURSE PRACTITIONER

## 2024-10-01 PROCEDURE — 90656 IIV3 VACC NO PRSV 0.5 ML IM: CPT | Mod: PBBFAC

## 2024-10-01 PROCEDURE — 1159F MED LIST DOCD IN RCRD: CPT | Mod: CPTII,,, | Performed by: NURSE PRACTITIONER

## 2024-10-01 PROCEDURE — 1160F RVW MEDS BY RX/DR IN RCRD: CPT | Mod: CPTII,,, | Performed by: NURSE PRACTITIONER

## 2024-10-01 PROCEDURE — 3008F BODY MASS INDEX DOCD: CPT | Mod: CPTII,,, | Performed by: NURSE PRACTITIONER

## 2024-10-01 PROCEDURE — G0008 ADMIN INFLUENZA VIRUS VAC: HCPCS | Mod: PBBFAC

## 2024-10-01 PROCEDURE — 99213 OFFICE O/P EST LOW 20 MIN: CPT | Mod: PBBFAC | Performed by: NURSE PRACTITIONER

## 2024-10-01 PROCEDURE — 3074F SYST BP LT 130 MM HG: CPT | Mod: CPTII,,, | Performed by: NURSE PRACTITIONER

## 2024-10-01 RX ORDER — MUPIROCIN 20 MG/G
OINTMENT TOPICAL 2 TIMES DAILY
Qty: 22 G | Refills: 0 | Status: SHIPPED | OUTPATIENT
Start: 2024-10-01 | End: 2024-10-08

## 2024-10-01 RX ADMIN — INFLUENZA VIRUS VACCINE 0.5 ML: 15; 15; 15 SUSPENSION INTRAMUSCULAR at 11:10

## 2024-10-01 NOTE — PROGRESS NOTES
L Walter, NP   [facility]  [address] [facility] [address]      PATIENT NAME: Beverly Lee  : 1975  DATE: 10/1/24  MRN: [id number]        History of Present Illness / Problem Focused Workflow     Beverly Lee presents to the clinic with Otalgia (Left ear pain since 2024 . Feels like  blackhead)     48 yo female accompanied by her daughter for evaluation of left ear pain. She states she feels like it is a pimple ongoing since 2024 and not improving. Treating with peroxide. Denies any drainage, tinnitus or hearing loss. Wants flu vaccine today. Tolerated before in the past without s/e.     Review of Systems     Review of Systems   Constitutional: Negative.    HENT:  Positive for ear pain (left ear pain).    Eyes: Negative.    Respiratory: Negative.     Cardiovascular: Negative.    Gastrointestinal: Negative.    Endocrine: Negative.    Genitourinary: Negative.    Musculoskeletal: Negative.    Skin: Negative.    Allergic/Immunologic: Negative.    Neurological: Negative.    Hematological: Negative.    Psychiatric/Behavioral: Negative.         Medical / Social / Family History     -------------------------------------    Abnormal Pap smear of cervix    Acute cystitis with hematuria    Arthritis    BMI 36.0-36.9,adult    Breast pain, left    Carpal tunnel syndrome    Gastritis    Hypertension    Respiratory distress        Past Surgical History:   Procedure Laterality Date     SECTION      CHOLECYSTECTOMY      COLONOSCOPY, WITH POLYPECTOMY USING HOT BIOPSY FORCEPS N/A 2024    Procedure: COLONOSCOPY, WITH POLYPECTOMY USING HOT BIOPSY FORCEPS;  Surgeon: SUKUMAR Quinones MD;  Location: [facility] ENDOSCOPY;  Service: Gastroenterology;  Laterality: N/A;  Cold forceps    COLONOSCOPY, WITH POLYPECTOMY USING SNARE N/A 2024    Procedure: COLONOSCOPY, WITH POLYPECTOMY USING SNARE;  Surgeon: SUKUMAR Quinones MD;   Location: [facility] ENDOSCOPY;  Service: Gastroenterology;  Laterality: N/A;    gastric sleeve  05/26/2022    HAND SURGERY      LAPAROSCOPIC CHOLECYSTECTOMY  10/13/2022    NERVE REPAIR      PENECTOMY, TOTAL, RADICAL, WITH LYMPHADENECTOMY      RECTAL SURGERY      REPAIR OF LIGAMENT OF ANKLE      Right leg vein procedure   08/2024    TONSILLECTOMY      VAGINA SURGERY      vaginal fistula    WISDOM TOOTH EXTRACTION         Social History     Socioeconomic History    Marital status: Single    Number of children: 3   Tobacco Use    Smoking status: Never    Smokeless tobacco: Never   Substance and Sexual Activity    Alcohol use: Not Currently    Drug use: Never    Sexual activity: Yes     Partners: Male   Social History Narrative    ** Merged History Encounter **          Social Drivers of Health     Financial Resource Strain: Low Risk  (9/4/2024)    Overall Financial Resource Strain (CARDIA)     Difficulty of Paying Living Expenses: Not hard at all   Food Insecurity: No Food Insecurity (9/4/2024)    Hunger Vital Sign     Worried About Running Out of Food in the Last Year: Never true     Ran Out of Food in the Last Year: Never true   Transportation Needs: No Transportation Needs (8/7/2024)    TRANSPORTATION NEEDS     Transportation : No   Physical Activity: Sufficiently Active (8/7/2024)    Exercise Vital Sign     Days of Exercise per Week: 3 days     Minutes of Exercise per Session: 50 min   Stress: Stress Concern Present (8/7/2024)    [employer] - Occupational Stress Questionnaire     Feeling of Stress : Very much   Housing Stability: Low Risk  (9/4/2024)    Housing Stability Vital Sign     Unable to Pay for Housing in the Last Year: No     Homeless in the Last Year: No        Family History   Problem Relation Name Age of Onset    COPD Mother      Arthritis Mother      Lung cancer Mother      Hypertension Mother      Hyperlipidemia Father      Rheum arthritis Father      Colon cancer Father       Rheum arthritis Sister      ALS Paternal Uncle      Parkinsonism Paternal Uncle          Medications and Allergies     Medications  Current Outpatient Medications   Medication Instructions    aluminum & magnesium hydroxide-simethicone (MYLANTA MAX STRENGTH) 400-400-40 mg/5 mL suspension 5 mLs, Oral, Every 6 hours PRN    calcium-vitamin D3 (OS-FIGUEROA 500 + D3) 500 mg-5 mcg (200 unit) per tablet 1 tablet    cetirizine (ZYRTEC) 10 mg, Daily PRN    cholestyramine (QUESTRAN) 4 gram packet 4 g, Oral, 3 times daily with meals    clindamycin (CLEOCIN T) 1 % Swab SMARTSI Topical Every Morning    clotrimazole (LOTRIMIN) 1 % cream Topical (Top), 2 times daily    dextroamphetamine-amphetamine (ADDERALL XR) 30 MG 24 hr capsule No dose, route, or frequency recorded.    diclofenac sodium (VOLTAREN) 1 % Gel APPLY TOPICALLY TO THE AFFECTED AREA FOUR TIMES DAILY AS NEEDED FOR PAIN    finasteride (PROSCAR) 5 mg, Daily    fluticasone propionate (FLONASE) 50 mcg/actuation nasal spray prn    ibuprofen (ADVIL,MOTRIN) 800 MG tablet TAKE 1 TABLET(800 MG) BY MOUTH THREE TIMES DAILY WITH FOOD AS NEEDED FOR PAIN    LIDOcaine-diphenhyd-Al-mag-sim (FIRST-MOUTHWASH BLM) -333-40 mg/30mL mouthwash suspension 10 mLs, Swish & Spit, 3 times daily    LINZESS 145 mcg, Daily    minoxidiL (LONITEN) 1.25 mg, Daily    mupirocin (BACTROBAN) 2 % ointment Topical (Top), 2 times daily    MV-MN-IRON FUM-FA-OMEGA3,6,9#3 ORAL 1 tablet, Daily    nystatin-triamcinolone (MYCOLOG II) cream 2 times daily PRN    omeprazole (PRILOSEC) 20 mg, Daily    ondansetron (ZOFRAN-ODT) 4 mg, Every 8 hours PRN    PaxiL 30 mg, Every morning    sucralfate (CARAFATE) 1 g, 4 times daily    traZODone (DESYREL)  mg, Nightly       Allergies  Review of patient's allergies indicates:   Allergen Reactions    Macrolide antibiotics     Nitrofurantoin      Other reaction(s): Palpitations  Other reaction(s): Palpitations    Nitrofurantoin monohyd/m-cryst Palpitations       Physical  Examination     Visit Vitals  /82 (BP Location: Left arm)   Pulse 76   Temp 98.3 °F (36.8 °C) (Oral)   Resp 18   Ht 5' (1.524 m)   Wt 85.7 kg (189 lb)   LMP 09/19/2024 (Exact Date)   SpO2 100%   BMI 36.91 kg/m²       Physical Exam  Constitutional:       General: She is not in acute distress.     Appearance: Normal appearance. She is not ill-appearing or diaphoretic.   HENT:      Head: Normocephalic.      Left Ear: Tympanic membrane normal. Tenderness present.      Ears:      Comments: Negative erythema, drainage, edema   Small area noted to be pimple without signs of abscess or infection  Cardiovascular:      Rate and Rhythm: Normal rate and regular rhythm.      Heart sounds: No murmur heard.  Pulmonary:      Effort: Pulmonary effort is normal. No respiratory distress.      Breath sounds: Normal breath sounds. No stridor. No wheezing, rhonchi or rales.   Skin:     General: Skin is warm and dry.   Neurological:      Mental Status: She is alert and oriented to person, place, and time. Mental status is at baseline.      Coordination: Coordination normal.      Gait: Gait normal.   Psychiatric:         Mood and Affect: Mood normal.         Behavior: Behavior normal.         Thought Content: Thought content normal.         Judgment: Judgment normal.           Results       Assessment       ICD-10-CM ICD-9-CM   1. Left ear pain  H92.02 388.70   2. Immunization due  Z23 V05.9       Plan       Problem List Items Addressed This Visit    None  Visit Diagnoses       Left ear pain    -  Primary  Pt can apply warm compress to area. No signs of infection to warrant antibiotic therapy. Will send Bactroban to apply BID as needed. Referral to ENT.    Relevant Orders    Ambulatory referral/consult to ENT    Immunization due        Relevant Medications    influenza (Flulaval, Fluzone, Fluarix) 45 mcg/0.5 mL IM vaccine (> or = 6 mo) 0.5 mL (Completed)            Future Appointments   Date Time Provider Department Center    11/26/2024  3:00 PM Ayla Todd, FNP [facility] [place]   6/10/2025  8:30 AM Denia Quiles, ANP [facility] [place]   8/20/2025  9:00 AM [facility]        Follow up if symptoms worsen or fail to improve.    Signature:  Lauren Watson NP  [facility]  [address] [address]    Date of encounter: 10/1/24

## 2024-10-18 ENCOUNTER — OFFICE VISIT (OUTPATIENT)
Dept: INTERNAL MEDICINE | Facility: CLINIC | Age: 49
End: 2024-10-18
Payer: MEDICARE

## 2024-10-18 ENCOUNTER — TELEPHONE (OUTPATIENT)
Dept: INTERNAL MEDICINE | Facility: CLINIC | Age: 49
End: 2024-10-18

## 2024-10-18 VITALS
WEIGHT: 196.63 LBS | BODY MASS INDEX: 38.6 KG/M2 | RESPIRATION RATE: 18 BRPM | TEMPERATURE: 98 F | OXYGEN SATURATION: 100 % | SYSTOLIC BLOOD PRESSURE: 126 MMHG | DIASTOLIC BLOOD PRESSURE: 84 MMHG | HEART RATE: 79 BPM | HEIGHT: 60 IN

## 2024-10-18 DIAGNOSIS — M54.6 CHRONIC BILATERAL THORACIC BACK PAIN: ICD-10-CM

## 2024-10-18 DIAGNOSIS — I10 PRIMARY HYPERTENSION: ICD-10-CM

## 2024-10-18 DIAGNOSIS — Z98.84 S/P BARIATRIC SURGERY: ICD-10-CM

## 2024-10-18 DIAGNOSIS — G89.29 CHRONIC BILATERAL THORACIC BACK PAIN: ICD-10-CM

## 2024-10-18 DIAGNOSIS — D50.9 IRON DEFICIENCY ANEMIA, UNSPECIFIED IRON DEFICIENCY ANEMIA TYPE: ICD-10-CM

## 2024-10-18 DIAGNOSIS — Z01.818 PREOPERATIVE EXAMINATION: Primary | ICD-10-CM

## 2024-10-18 PROCEDURE — 99214 OFFICE O/P EST MOD 30 MIN: CPT | Mod: PBBFAC

## 2024-10-18 NOTE — PROGRESS NOTES
Attending Physician Addendum  Management and plan discussed with resident at time of clinic visit and agree with assessment.

## 2024-10-18 NOTE — TELEPHONE ENCOUNTER
Labs ordered on 8/7/2024 by Lauren Watson NP dates need to be changed for patient to complete for surgery clearance.  Dr. Lal ( Plastic Surgeon) requesting for lab results to be within 30 days. Also additional labs needed to be ordered PT, INR and PTT. Once labs are update can the patient be contacted to complete labs. Thanks in advance

## 2024-10-18 NOTE — PROGRESS NOTES
Department of Internal Medicine  Pre-Operative Clearance      10/18/2024    Reason for Pre-op: Breast augmentation     History of present illness:  Patient is a 49 y.o. female with history of HTN carpal tunnel, and history of gastric bypass surgery who presents to the clinic today for pre-operative evaluation.    Reviewed past medical/surgical/social history    Review of Systems :  General - negative for fevers/chills, night sweats, or unintentional weight gain/loss  HENT - negative for vision change, nasal congestion, rhinorrhea, sore throat, or ear pain  Cardiovascular - negative for chest pain, worsening edema, palpitations  Respiratory - negative for cough, shortness of breath, hemoptysis, wheezing  GI - negative for nausea/vomiting, diarrhea/constipation, hematochezia, melena   - negative for incontinence, dysuria, hematuria  Neurosensory - negative for focal acute motor or sensory loss, acute changes in mentation  Musculoskeletal - negative for joint pain, swelling, redness, warmth   Skin - negative for rashes, color change, itching, or new lesions  Psych - no significant anxiety or depression      Past Medical History:   Diagnosis Date    Abnormal Pap smear of cervix     Acute cystitis with hematuria 2023    Arthritis     BMI 36.0-36.9,adult 2023    Breast pain, left 2024    Carpal tunnel syndrome     Gastritis     Hypertension     Respiratory distress       Past Surgical History:   Procedure Laterality Date     SECTION      CHOLECYSTECTOMY      COLONOSCOPY, WITH POLYPECTOMY USING HOT BIOPSY FORCEPS N/A 2024    Procedure: COLONOSCOPY, WITH POLYPECTOMY USING HOT BIOPSY FORCEPS;  Surgeon: SUKUMAR Quinones MD;  Location: [facility] ENDOSCOPY;  Service: Gastroenterology;  Laterality: N/A;  Cold forceps    COLONOSCOPY, WITH POLYPECTOMY USING SNARE N/A 2024    Procedure: COLONOSCOPY, WITH POLYPECTOMY USING SNARE;  Surgeon: SUKUMAR Quinones MD;  Location: [facility] ENDOSCOPY;  Service:  Gastroenterology;  Laterality: N/A;    gastric sleeve  2022    HAND SURGERY      LAPAROSCOPIC CHOLECYSTECTOMY  10/13/2022    NERVE REPAIR      PENECTOMY, TOTAL, RADICAL, WITH LYMPHADENECTOMY      RECTAL SURGERY      REPAIR OF LIGAMENT OF ANKLE      Right leg vein procedure   2024    TONSILLECTOMY      VAGINA SURGERY      vaginal fistula    WISDOM TOOTH EXTRACTION        (Not in a hospital admission)      Current Outpatient Medications:     aluminum & magnesium hydroxide-simethicone (MYLANTA MAX STRENGTH) 400-400-40 mg/5 mL suspension, Take 5 mLs by mouth every 6 (six) hours as needed., Disp: , Rfl:     calcium-vitamin D3 (OS-FIGUEROA 500 + D3) 500 mg-5 mcg (200 unit) per tablet, Take 1 tablet by mouth., Disp: , Rfl:     cetirizine (ZYRTEC) 10 MG tablet, Take 10 mg by mouth daily as needed., Disp: , Rfl:     cholestyramine (QUESTRAN) 4 gram packet, Take 1 packet (4 g total) by mouth 3 (three) times daily with meals., Disp: 270 packet, Rfl: 12    clindamycin (CLEOCIN T) 1 % Swab, SMARTSI Topical Every Morning, Disp: , Rfl:     clotrimazole (LOTRIMIN) 1 % cream, Apply topically 2 (two) times daily., Disp: 60 g, Rfl: 1    dextroamphetamine-amphetamine (ADDERALL XR) 30 MG 24 hr capsule, Take 30 mg by mouth every morning., Disp: , Rfl:     diclofenac sodium (VOLTAREN) 1 % Gel, APPLY TOPICALLY TO THE AFFECTED AREA FOUR TIMES DAILY AS NEEDED FOR PAIN, Disp: , Rfl:     finasteride (PROSCAR) 5 mg tablet, Take 5 mg by mouth once daily., Disp: , Rfl:     fluticasone propionate (FLONASE) 50 mcg/actuation nasal spray, prn, Disp: , Rfl:     ibuprofen (ADVIL,MOTRIN) 800 MG tablet, TAKE 1 TABLET(800 MG) BY MOUTH THREE TIMES DAILY WITH FOOD AS NEEDED FOR PAIN, Disp: 90 tablet, Rfl: 2    LINZESS 145 mcg Cap capsule, Take 145 mcg by mouth once daily., Disp: , Rfl:     minoxidiL (LONITEN) 2.5 MG tablet, Take 1.25 mg by mouth once daily., Disp: , Rfl:     MV-MN-IRON FUM-FA-OMEGA3,6,9#3 ORAL, Take 1 tablet by mouth once daily.,  Disp: , Rfl:     nystatin-triamcinolone (MYCOLOG II) cream, Apply topically 2 (two) times daily as needed., Disp: , Rfl:     omeprazole (PRILOSEC) 20 MG capsule, Take 20 mg by mouth once daily., Disp: , Rfl:     ondansetron (ZOFRAN-ODT) 4 MG TbDL, Take 4 mg by mouth every 8 (eight) hours as needed., Disp: , Rfl:     PAXIL 30 mg tablet, Take 30 mg by mouth every morning., Disp: , Rfl:     sucralfate (CARAFATE) 1 gram tablet, Take 1 g by mouth 4 (four) times daily., Disp: , Rfl:     traZODone (DESYREL) 50 MG tablet, Take  mg by mouth nightly., Disp: , Rfl:     LIDOcaine-diphenhyd-Al-mag-sim (FIRST-MOUTHWASH BLM) -376-40 mg/30mL mouthwash suspension, Swish and spit 10 mLs 3 (three) times daily. (Patient not taking: Reported on 10/18/2024), Disp: 90 mL, Rfl: 0  Review of patient's allergies indicates:   Allergen Reactions    Macrolide antibiotics     Nitrofurantoin      Other reaction(s): Palpitations  Other reaction(s): Palpitations    Nitrofurantoin monohyd/m-cryst Palpitations      Social History     Tobacco Use    Smoking status: Never    Smokeless tobacco: Never   Substance Use Topics    Alcohol use: Not Currently      Family History   Problem Relation Name Age of Onset    COPD Mother      Arthritis Mother      Lung cancer Mother      Hypertension Mother      Hyperlipidemia Father      Rheum arthritis Father      Colon cancer Father      Rheum arthritis Sister      ALS Paternal Uncle      Parkinsonism Paternal Uncle          Physical Exam :  Vitals:    10/18/24 0834   BP: 126/84   Pulse: 79   Resp: 18   Temp: 98.1 °F (36.7 °C)     Lab Results   Component Value Date    WBC 8.69 08/07/2024    HGB 12.7 08/07/2024    HCT 38.4 08/07/2024     08/07/2024    MCV 95.8 (H) 08/07/2024    RDW 12.2 08/07/2024    MPV 10.3 08/07/2024     Lab Results   Component Value Date     08/07/2024    K 4.0 08/07/2024     08/07/2024    CO2 28 08/07/2024    BUN 10.1 08/07/2024    CALCIUM 9.7 08/07/2024     ANIONGAP 7 (L) 12/15/2023     Lab Results   Component Value Date    AST 16 08/07/2024    ALT 10 08/07/2024    ALKPHOS 69 08/07/2024    ALBUMIN 3.8 08/07/2024    BILITOT 0.5 08/07/2024     Body mass index is 38.41 kg/m².    GENERAL: Patient is a 49 y.o. female who looks 49 y.o. old, is alert and oriented and in no acute distress  HEENT: PERRL, EOMI, no erythema or exudates of pharynx  CHEST: Lungs sounds clear and vesicular in all four quadrants with no wheezes, rales, or rochi  CARDIOVASCULAR: RRR, no murmurs, clicks, rubs, or gallops, radial pulses 2+ and symmetric  ABDOMEN: Non-tender to palpation, normal bowel sounds  EXTREMITIES: No trauma, swollen joints, cyanosis, or edema  NEUROLOGICAL: CN II-XII intact and no gross deficits    Assessment :     1. Preoperative examination    2. Primary hypertension    3. S/P bariatric surgery    4. Chronic bilateral thoracic back pain    5. Iron deficiency anemia, unspecified iron deficiency anemia type        Plan :     Revised Cardiac Risk Index (RCRI):  - High-Risk Surgery: 0 (Intraperitoneal, Intrathoracic, Suprainguinal vascular)  - Hx of IHD: 0 (MI, +Stress, current angina, use of nitrates, ECG w/ Q waves  - Hx of CHF: 0 (Pulm edema, bilat rales, s3; PND; CXR pulm vasc redistribution)  - Hx of CVD: 0 (prior TIA or stroke)  - Pre-op Insulin: 0  - Pre-op Cr >2: 0    RCRI Index: 0  0 pts; Class 1 Risk; 3.9% risk of major cardiac event  1 pt; Class 2 Risk; 6.0% risk of major cardiac event  2 pt; Class 3 Risk; 10.1% risk of major cardiac event  >3pts; Class 4 Risk; 15% risk of major cardiac event      FUNCTIONAL CAPACITY:  < 4 METs -unable to walk > 2 blocks on level ground without stopping due to symptoms  - eating, dressing, toileting, walking indoors, light housework. POOR   > 4 METs -climbing > 1 flight of stairs without stopping  -walking up hill > 1-2 blocks  -scrubbing floors  -moving furniture  - golf, bowling, dancing or tennis  -running short distance MODERATE TO  EXCELLENT   METs Score:    > 4 METS    This is a low-risk patient set to undergo a low-to-moderate risk procedure     Pending pre-operative labs which were ordered and will be reviewed by PCP pre-operatively. Risk stratification to change pending any significant changes in lab results from prior.     Staff: Dr. Jean Rincon, DO  [facility] Internal Medicine PGY-2

## 2024-10-21 ENCOUNTER — TELEPHONE (OUTPATIENT)
Dept: INTERNAL MEDICINE | Facility: CLINIC | Age: 49
End: 2024-10-21
Payer: MEDICARE

## 2024-10-21 DIAGNOSIS — R79.1 ABNORMAL COAGULATION PROFILE: ICD-10-CM

## 2024-10-21 DIAGNOSIS — Z01.818 PRE-OP TESTING: Primary | ICD-10-CM

## 2024-10-22 ENCOUNTER — LAB VISIT (OUTPATIENT)
Dept: LAB | Facility: HOSPITAL | Age: 49
End: 2024-10-22
Attending: NURSE PRACTITIONER
Payer: MEDICARE

## 2024-10-22 DIAGNOSIS — R79.1 ABNORMAL COAGULATION PROFILE: ICD-10-CM

## 2024-10-22 DIAGNOSIS — Z01.818 PRE-OP TESTING: ICD-10-CM

## 2024-10-22 LAB
ALBUMIN SERPL-MCNC: 3.3 G/DL (ref 3.5–5)
ALBUMIN/GLOB SERPL: 1.1 RATIO (ref 1.1–2)
ALP SERPL-CCNC: 64 UNIT/L (ref 40–150)
ALT SERPL-CCNC: 7 UNIT/L (ref 0–55)
ANION GAP SERPL CALC-SCNC: 5 MEQ/L
APTT PPP: 27.7 SECONDS (ref 23.2–33.7)
AST SERPL-CCNC: 15 UNIT/L (ref 5–34)
BASOPHILS # BLD AUTO: 0.02 X10(3)/MCL
BASOPHILS NFR BLD AUTO: 0.3 %
BILIRUB SERPL-MCNC: 0.4 MG/DL
BUN SERPL-MCNC: 13.7 MG/DL (ref 7–18.7)
CALCIUM SERPL-MCNC: 8.8 MG/DL (ref 8.4–10.2)
CHLORIDE SERPL-SCNC: 109 MMOL/L (ref 98–107)
CO2 SERPL-SCNC: 26 MMOL/L (ref 22–29)
CREAT SERPL-MCNC: 0.8 MG/DL (ref 0.55–1.02)
CREAT/UREA NIT SERPL: 17
EOSINOPHIL # BLD AUTO: 0.32 X10(3)/MCL (ref 0–0.9)
EOSINOPHIL NFR BLD AUTO: 5.1 %
ERYTHROCYTE [DISTWIDTH] IN BLOOD BY AUTOMATED COUNT: 12 % (ref 11.5–17)
GFR SERPLBLD CREATININE-BSD FMLA CKD-EPI: >60 ML/MIN/1.73/M2
GLOBULIN SER-MCNC: 3 GM/DL (ref 2.4–3.5)
GLUCOSE SERPL-MCNC: 93 MG/DL (ref 74–100)
HCT VFR BLD AUTO: 34.7 % (ref 37–47)
HGB BLD-MCNC: 11.3 G/DL (ref 12–16)
IMM GRANULOCYTES # BLD AUTO: 0.02 X10(3)/MCL (ref 0–0.04)
IMM GRANULOCYTES NFR BLD AUTO: 0.3 %
INR PPP: 1
LYMPHOCYTES # BLD AUTO: 1.47 X10(3)/MCL (ref 0.6–4.6)
LYMPHOCYTES NFR BLD AUTO: 23.3 %
MCH RBC QN AUTO: 30.6 PG (ref 27–31)
MCHC RBC AUTO-ENTMCNC: 32.6 G/DL (ref 33–36)
MCV RBC AUTO: 94 FL (ref 80–94)
MONOCYTES # BLD AUTO: 0.29 X10(3)/MCL (ref 0.1–1.3)
MONOCYTES NFR BLD AUTO: 4.6 %
NEUTROPHILS # BLD AUTO: 4.19 X10(3)/MCL (ref 2.1–9.2)
NEUTROPHILS NFR BLD AUTO: 66.4 %
NRBC BLD AUTO-RTO: 0 %
PLATELET # BLD AUTO: 215 X10(3)/MCL (ref 130–400)
PMV BLD AUTO: 10.7 FL (ref 7.4–10.4)
POTASSIUM SERPL-SCNC: 3.7 MMOL/L (ref 3.5–5.1)
PROT SERPL-MCNC: 6.3 GM/DL (ref 6.4–8.3)
PROTHROMBIN TIME: 13.1 SECONDS (ref 11.4–14)
RBC # BLD AUTO: 3.69 X10(6)/MCL (ref 4.2–5.4)
SODIUM SERPL-SCNC: 140 MMOL/L (ref 136–145)
WBC # BLD AUTO: 6.31 X10(3)/MCL (ref 4.5–11.5)

## 2024-10-22 PROCEDURE — 85610 PROTHROMBIN TIME: CPT

## 2024-10-22 PROCEDURE — 80053 COMPREHEN METABOLIC PANEL: CPT

## 2024-10-22 PROCEDURE — 85025 COMPLETE CBC W/AUTO DIFF WBC: CPT

## 2024-10-22 PROCEDURE — 85730 THROMBOPLASTIN TIME PARTIAL: CPT

## 2024-10-22 PROCEDURE — 36415 COLL VENOUS BLD VENIPUNCTURE: CPT

## 2024-11-04 ENCOUNTER — TELEPHONE (OUTPATIENT)
Dept: INTERNAL MEDICINE | Facility: CLINIC | Age: 49
End: 2024-11-04
Payer: MEDICARE

## 2024-11-04 DIAGNOSIS — K14.6 BURNING TONGUE: ICD-10-CM

## 2024-11-04 RX ORDER — ALUMINUM HYDROXIDE, MAGNESIUM HYDROXIDE, AND SIMETHICONE 2400; 240; 2400 MG/30ML; MG/30ML; MG/30ML
5 SUSPENSION ORAL EVERY 6 HOURS PRN
Status: CANCELLED | OUTPATIENT
Start: 2024-11-04 | End: 2026-11-15

## 2024-11-04 NOTE — TELEPHONE ENCOUNTER
----- Message from Nadia sent at 11/4/2024 11:16 AM CST -----  .Who Called: Beverly Lee        Preferred Method of Contact: Phone Call  Patient's Preferred Phone Number on File: [phone]   Best Call Back Number, if different:  Additional Information: pt taken vitamin otc pt allergic please advice

## 2024-11-04 NOTE — TELEPHONE ENCOUNTER
Call returned , was informed that she had swelling to the inside of her  mouth . She stop taking her iron for 4 days and it's better. Concerned of her anemia , if there's a replacement.   She would like to refill the magic  mouthwash - helped before. Please advise.

## 2024-11-13 ENCOUNTER — OFFICE VISIT (OUTPATIENT)
Dept: ORTHOPEDICS | Facility: CLINIC | Age: 49
End: 2024-11-13
Payer: MEDICARE

## 2024-11-13 VITALS — WEIGHT: 196.63 LBS | HEIGHT: 60 IN | BODY MASS INDEX: 38.6 KG/M2

## 2024-11-13 DIAGNOSIS — M17.11 PRIMARY OSTEOARTHRITIS OF RIGHT KNEE: ICD-10-CM

## 2024-11-13 DIAGNOSIS — M65.4 DE QUERVAIN'S TENOSYNOVITIS, LEFT: Primary | ICD-10-CM

## 2024-11-13 RX ORDER — LIDOCAINE HYDROCHLORIDE 10 MG/ML
1 INJECTION, SOLUTION INFILTRATION; PERINEURAL
Status: DISCONTINUED | OUTPATIENT
Start: 2024-11-13 | End: 2024-11-13 | Stop reason: HOSPADM

## 2024-11-13 RX ORDER — BETAMETHASONE SODIUM PHOSPHATE AND BETAMETHASONE ACETATE 3; 3 MG/ML; MG/ML
6 INJECTION, SUSPENSION INTRA-ARTICULAR; INTRALESIONAL; INTRAMUSCULAR; SOFT TISSUE
Status: DISCONTINUED | OUTPATIENT
Start: 2024-11-13 | End: 2024-11-13 | Stop reason: HOSPADM

## 2024-11-13 RX ORDER — IBUPROFEN 800 MG/1
800 TABLET ORAL 3 TIMES DAILY
Qty: 21 TABLET | Refills: 0 | Status: SHIPPED | OUTPATIENT
Start: 2024-11-13

## 2024-11-13 RX ADMIN — LIDOCAINE HYDROCHLORIDE 1 ML: 10 INJECTION, SOLUTION INFILTRATION; PERINEURAL at 10:11

## 2024-11-13 RX ADMIN — BETAMETHASONE SODIUM PHOSPHATE AND BETAMETHASONE ACETATE 6 MG: 3; 3 INJECTION, SUSPENSION INTRA-ARTICULAR; INTRALESIONAL; INTRAMUSCULAR; SOFT TISSUE at 10:11

## 2024-11-13 NOTE — PROGRESS NOTES
Orthopedic Clinic - Hand    Chief Complaint:  Left wrist pain      History of Present Illness: Beverly Lee is a 49 y.o. female here today for left wrist pain.  Patient states that her wrist has been giving her trouble for the last couple of weeks.  She says that she experiences more pain when bending the wrist.  She denies numbness or tingling.  No prior injuries. Her last DeQuervain's injection was on 2024. She states that it provided her relief until recently.       Past Medical History:   Diagnosis Date    Abnormal Pap smear of cervix     Acute cystitis with hematuria 2023    Arthritis     BMI 36.0-36.9,adult 2023    Breast pain, left 2024    Carpal tunnel syndrome     Gastritis     Hypertension     Respiratory distress         Past Surgical History:   Procedure Laterality Date     SECTION      CHOLECYSTECTOMY      COLONOSCOPY, WITH POLYPECTOMY USING HOT BIOPSY FORCEPS N/A 2024    Procedure: COLONOSCOPY, WITH POLYPECTOMY USING HOT BIOPSY FORCEPS;  Surgeon: SUKUMAR Quinones MD;  Location: [facility] ENDOSCOPY;  Service: Gastroenterology;  Laterality: N/A;  Cold forceps    COLONOSCOPY, WITH POLYPECTOMY USING SNARE N/A 2024    Procedure: COLONOSCOPY, WITH POLYPECTOMY USING SNARE;  Surgeon: SUKUMAR Quinones MD;  Location: [facility] ENDOSCOPY;  Service: Gastroenterology;  Laterality: N/A;    gastric sleeve  2022    HAND SURGERY      LAPAROSCOPIC CHOLECYSTECTOMY  10/13/2022    NERVE REPAIR      PENECTOMY, TOTAL, RADICAL, WITH LYMPHADENECTOMY      RECTAL SURGERY      REPAIR OF LIGAMENT OF ANKLE      Right leg vein procedure   2024    TONSILLECTOMY      VAGINA SURGERY      vaginal fistula    WISDOM TOOTH EXTRACTION          Social History     Tobacco Use    Smoking status: Never    Smokeless tobacco: Never   Substance and Sexual Activity    Alcohol use: Not Currently    Drug use: Never    Sexual activity: Yes     Partners: Male        Current Outpatient Medications    Medication Instructions    aluminum & magnesium hydroxide-simethicone (MYLANTA MAX STRENGTH) 400-400-40 mg/5 mL suspension 5 mLs, Every 6 hours PRN    calcium-vitamin D3 (OS-FIGUEROA 500 + D3) 500 mg-5 mcg (200 unit) per tablet 1 tablet    cetirizine (ZYRTEC) 10 mg, Daily PRN    cholestyramine (QUESTRAN) 4 gram packet 4 g, Oral, 3 times daily with meals    clindamycin (CLEOCIN T) 1 % Swab SMARTSI Topical Every Morning    clotrimazole (LOTRIMIN) 1 % cream Topical (Top), 2 times daily    dextroamphetamine-amphetamine (ADDERALL XR) 30 MG 24 hr capsule 30 mg, Every morning    diclofenac sodium (VOLTAREN) 1 % Gel APPLY TOPICALLY TO THE AFFECTED AREA FOUR TIMES DAILY AS NEEDED FOR PAIN    finasteride (PROSCAR) 5 mg, Daily    fluticasone propionate (FLONASE) 50 mcg/actuation nasal spray prn    ibuprofen (ADVIL,MOTRIN) 800 mg, Oral, 3 times daily    LIDOcaine-diphenhyd-Al-mag-sim (FIRST-MOUTHWASH BLM) -441-40 mg/30mL mouthwash suspension 10 mLs, Swish & Spit, 3 times daily    LINZESS 145 mcg, Daily    minoxidiL (LONITEN) 1.25 mg, Daily    MV-MN-IRON FUM-FA-OMEGA3,6,9#3 ORAL 1 tablet, Daily    nystatin-triamcinolone (MYCOLOG II) cream 2 times daily PRN    omeprazole (PRILOSEC) 20 mg, Daily    ondansetron (ZOFRAN-ODT) 4 mg, Every 8 hours PRN    PaxiL 30 mg, Every morning    sucralfate (CARAFATE) 1 g, 4 times daily    traZODone (DESYREL)  mg, Nightly       Review of patient's allergies indicates:   Allergen Reactions    Macrolide antibiotics     Nitrofurantoin      Other reaction(s): Palpitations  Other reaction(s): Palpitations    Nitrofurantoin monohyd/m-cryst Palpitations        Patient Care Team:  Lauren Watson, NP as PCP - General (Nurse Practitioner)     Review of Systems:    Constitution:   Denies chills, fever, and sweats.  HENT:   Denies headaches or blurry vision.  Cardiovascular:   Denies chest pain or irregular heart beat.  Respiratory:   Denies cough or shortness of breath.  Gastrointestinal:   Denies abdominal pain, nausea, or vomiting.  Musculoskeletal:   Denies muscle cramps.  Neurological:   Denies dizziness or focal weakness.  Psychiatric/Behavior: Normal mental status.  Hematology/Lymph:  Denies bleeding problem or easy bruising/bleeding.  Skin:    Denies rash or suspicious lesions.    Physical Examination:    Vital Signs:    Vitals:    11/13/24 1016   Weight: 89.2 kg (196 lb 10.4 oz)   Height: 5' (1.524 m)   Body mass index is 38.41 kg/m².    Constitution:   Well-developed, well nourished patient in no acute distress.  Neurological:   Alert and oriented x 3 and cooperative to examination.     Psychiatric/Behavior: Normal mental status.  Respiratory:   No shortness of breath. Non-labored breathing.  Eyes:    Extraoccular muscles intact.    Musculoskeletal Examination:     Left Upper Extremity:  [x] No open wounds or rashes.    [] Abnormal skin findings:.  [x] Full ROM of the wrist, hand and digits.    [] Limited ROM of the wrist, hand, and digits:   [x] Radial pulses 2+ is warm well perfused.      Positive Finkelstein's test  Tenderness to palpation of radial aspect of wrist  Mild swelling noted radial aspect of wrist       Imaging:   X-rays of the left wrist three views show no abnormal findings     Assessment:  Left de Quervain's tenosynovitis    Plan:  I think this patient would benefit from a steroid injection.  She has opted to receive a left de Quervain injection today in clinic.  I will send her home with a left thumb spica brace.  I informed her that she should wear this brace as needed. I informed her that if this injection does not provide relief that we will discuss surgical options. Follow up in 3 months to reassess.     Follow Up:  3 months    X-Ray at Next Visit:  None

## 2024-11-13 NOTE — PROCEDURES
Tendon Sheath    Date/Time: 11/13/2024 10:00 AM    Performed by: Elayne Gaytan PA-C  Authorized by: Elayne Gaytan PA-C    Consent Done?:  Yes (Verbal)  Indications:  Pain  Timeout: prior to procedure the correct patient, procedure, and site was verified    Local anesthesia used?: No    Location:  Wrist  Site:  L first doral compartment  Ultrasonic guidance for needle placement?: No    Needle size:  25 G  Approach:  Radial  Medications:  1 mL LIDOcaine HCL 10 mg/ml (1%) 10 mg/mL (1 %); 6 mg betamethasone acetate-betamethasone sodium phosphate 6 mg/mL  Patient tolerance:  Patient tolerated the procedure well with no immediate complications

## 2024-11-26 ENCOUNTER — TELEPHONE (OUTPATIENT)
Dept: ORTHOPEDICS | Facility: CLINIC | Age: 49
End: 2024-11-26

## 2024-12-05 NOTE — PROGRESS NOTES
[facility]  Otolaryngology Clinic Note    Beverly Lee  YOB: 1975    Chief Complaint:   Chief Complaint   Patient presents with    referral: Left Ear Pain        HPI: 2024: 49 y.o. female referred for left otalgia/pimple. States she was seen by her PCP twice for this, & it seems to have resolved spontaneously now. She does c/o b/l HL, L>R which has been progressively worsening for some time. Denies any previous otologic hx. She worked in a loud factory many years ago. Family hx of HL in her father, who wears aids. Also c/o intermittent nasal congestion, rhinitis, & PND for which she takes zyrtec prn.     ROS:   10-point review of systems negative except per HPI      Review of patient's allergies indicates:   Allergen Reactions    Macrolide antibiotics     Nitrofurantoin      Other reaction(s): Palpitations  Other reaction(s): Palpitations    Nitrofurantoin monohyd/m-cryst Palpitations       Past Medical History:   Diagnosis Date    Abnormal Pap smear of cervix     Acute cystitis with hematuria 2023    Arthritis     BMI 36.0-36.9,adult 2023    Breast pain, left 2024    Carpal tunnel syndrome     Gastritis     Hypertension     Respiratory distress        Past Surgical History:   Procedure Laterality Date     SECTION      CHOLECYSTECTOMY      COLONOSCOPY, WITH POLYPECTOMY USING HOT BIOPSY FORCEPS N/A 2024    Procedure: COLONOSCOPY, WITH POLYPECTOMY USING HOT BIOPSY FORCEPS;  Surgeon: SUKUMAR Quinones MD;  Location: [facility] ENDOSCOPY;  Service: Gastroenterology;  Laterality: N/A;  Cold forceps    COLONOSCOPY, WITH POLYPECTOMY USING SNARE N/A 2024    Procedure: COLONOSCOPY, WITH POLYPECTOMY USING SNARE;  Surgeon: SUKUMAR Quinones MD;  Location: [facility] ENDOSCOPY;  Service: Gastroenterology;  Laterality: N/A;    gastric sleeve  2022    HAND SURGERY      LAPAROSCOPIC CHOLECYSTECTOMY  10/13/2022    NERVE REPAIR      PENECTOMY, TOTAL, RADICAL,  WITH LYMPHADENECTOMY      RECTAL SURGERY      REPAIR OF LIGAMENT OF ANKLE      Right leg vein procedure   08/2024    TONSILLECTOMY      VAGINA SURGERY      vaginal fistula    WISDOM TOOTH EXTRACTION         Social History     Socioeconomic History    Marital status: Single    Number of children: 3   Tobacco Use    Smoking status: Never    Smokeless tobacco: Never   Substance and Sexual Activity    Alcohol use: Not Currently    Drug use: Never    Sexual activity: Yes     Partners: Male   Social History Narrative    ** Merged History Encounter **          Social Drivers of Health     Financial Resource Strain: Low Risk  (9/4/2024)    Overall Financial Resource Strain (CARDIA)     Difficulty of Paying Living Expenses: Not hard at all   Food Insecurity: No Food Insecurity (9/4/2024)    Hunger Vital Sign     Worried About Running Out of Food in the Last Year: Never true     Ran Out of Food in the Last Year: Never true   Transportation Needs: No Transportation Needs (8/7/2024)    TRANSPORTATION NEEDS     Transportation : No   Physical Activity: Sufficiently Active (8/7/2024)    Exercise Vital Sign     Days of Exercise per Week: 3 days     Minutes of Exercise per Session: 50 min   Stress: Stress Concern Present (8/7/2024)    [employer] - Occupational Stress Questionnaire     Feeling of Stress : Very much   Housing Stability: Low Risk  (9/4/2024)    Housing Stability Vital Sign     Unable to Pay for Housing in the Last Year: No     Homeless in the Last Year: No       Family History   Problem Relation Name Age of Onset    COPD Mother Naomy rafael     Arthritis Mother Naomy rafael     Lung cancer Mother Naomy rafael     Hypertension Mother Naomy rafael     Hyperlipidemia Father Naomy rafael     Rheum arthritis Father Naomy rafael     Colon cancer Father Naomy rafael     Rheum arthritis Sister      ALS Paternal Uncle      Parkinsonism Paternal Uncle         Outpatient Encounter Medications as of  2024   Medication Sig Dispense Refill    aluminum & magnesium hydroxide-simethicone (MYLANTA MAX STRENGTH) 400-400-40 mg/5 mL suspension Take 5 mLs by mouth every 6 (six) hours as needed.      calcium-vitamin D3 (OS-FIGUEROA 500 + D3) 500 mg-5 mcg (200 unit) per tablet Take 1 tablet by mouth.      cetirizine (ZYRTEC) 10 MG tablet Take 10 mg by mouth daily as needed.      cholestyramine (QUESTRAN) 4 gram packet Take 1 packet (4 g total) by mouth 3 (three) times daily with meals. 270 packet 12    clindamycin (CLEOCIN T) 1 % Swab SMARTSI Topical Every Morning      clotrimazole (LOTRIMIN) 1 % cream Apply topically 2 (two) times daily. 60 g 1    dextroamphetamine-amphetamine (ADDERALL XR) 30 MG 24 hr capsule Take 30 mg by mouth every morning.      diclofenac sodium (VOLTAREN) 1 % Gel APPLY TOPICALLY TO THE AFFECTED AREA FOUR TIMES DAILY AS NEEDED FOR PAIN      finasteride (PROSCAR) 5 mg tablet Take 5 mg by mouth once daily.      fluticasone propionate (FLONASE) 50 mcg/actuation nasal spray prn      ibuprofen (ADVIL,MOTRIN) 800 MG tablet Take 1 tablet (800 mg total) by mouth 3 (three) times daily. 21 tablet 0    LINZESS 145 mcg Cap capsule Take 145 mcg by mouth once daily.      minoxidiL (LONITEN) 2.5 MG tablet Take 1.25 mg by mouth once daily.      MV-MN-IRON FUM-FA-OMEGA3,6,9#3 ORAL Take 1 tablet by mouth once daily.      nystatin-triamcinolone (MYCOLOG II) cream Apply topically 2 (two) times daily as needed.      omeprazole (PRILOSEC) 20 MG capsule Take 20 mg by mouth once daily.      ondansetron (ZOFRAN-ODT) 4 MG TbDL Take 4 mg by mouth every 8 (eight) hours as needed.      PAXIL 30 mg tablet Take 30 mg by mouth every morning.      sucralfate (CARAFATE) 1 gram tablet Take 1 g by mouth 4 (four) times daily.      traZODone (DESYREL) 50 MG tablet Take  mg by mouth nightly.      LIDOcaine-diphenhyd-Al-mag-sim (FIRST-MOUTHWASH BLM) -240-40 mg/30mL mouthwash suspension Swish and spit 10 mLs 3 (three) times  daily. (Patient not taking: Reported on 12/6/2024) 90 mL 0    [DISCONTINUED] ibuprofen (ADVIL,MOTRIN) 800 MG tablet TAKE 1 TABLET(800 MG) BY MOUTH THREE TIMES DAILY WITH FOOD AS NEEDED FOR PAIN 90 tablet 2     No facility-administered encounter medications on file as of 12/6/2024.       Physical Exam:  Vitals:    12/06/24 0939   BP: 125/83   BP Location: Right arm   Patient Position: Sitting   Pulse: 97   Temp: 98 °F (36.7 °C)   TempSrc: Oral       Physical Exam   General: NAD, voice normal  Neuro: AAO, CN II - XII grossly intact  Head/ Face: NCAT, symmetric, sensations intact bilaterally  Eyes: EOMI, PERRL  Ears: externally normal with grossly normal hearing. [place] midline. AC > BC b/l  AD: EAC patent, TM intact, no middle ear effusion, no retractions  AS: EAC with cerumen impaction- atraumatic removal under microscopy with suction- TM intact, no middle ear effusion, no retractions  Nose: bilateral nares patent, midline septum, no rhinorrhea, no external deformity, mild turbinate hypertrophy  OC/OP: MMM, no intraoral lesions, no trismus, dentition is good, no uvular deviation, bilaterally symmetric soft palate elevation, palatoglossus and palatopharyngeal fold wnl; tonsils are symmetric/absent  Indirect laryngoscopy: deferred due to patient intolerance  Neck: soft, supple, no LAD, normal ROM, no thyromegaly  Respiratory: nonlabored, no wheezing, bilateral chest rise  Cardiovascular: RRR  Gastrointestinal: S NT ND  Skin: warm, no lesions  Musculoskeletal: 5/5 strength  Psych: Appropriate affect/mood     Pertinent Data:  ? LABS:  ? AUDIO:           ? PATH:      Imaging:   I personally reviewed the following images:        Assessment/Plan:  49 y.o. female with subjective HL & AR. Left cerumen impaction removed today.   - Flonase BID (reviewed technique)  - Audio  - RTC 3mo    Melanie Vigil NP

## 2024-12-06 ENCOUNTER — OFFICE VISIT (OUTPATIENT)
Dept: OTOLARYNGOLOGY | Facility: CLINIC | Age: 49
End: 2024-12-06
Payer: MEDICARE

## 2024-12-06 VITALS — SYSTOLIC BLOOD PRESSURE: 125 MMHG | DIASTOLIC BLOOD PRESSURE: 83 MMHG | HEART RATE: 97 BPM | TEMPERATURE: 98 F

## 2024-12-06 DIAGNOSIS — H61.22 IMPACTED CERUMEN OF LEFT EAR: ICD-10-CM

## 2024-12-06 DIAGNOSIS — J30.9 ALLERGIC RHINITIS, UNSPECIFIED SEASONALITY, UNSPECIFIED TRIGGER: ICD-10-CM

## 2024-12-06 DIAGNOSIS — H92.02 LEFT EAR PAIN: ICD-10-CM

## 2024-12-06 DIAGNOSIS — H91.90 HEARING DISORDER, UNSPECIFIED LATERALITY: Primary | ICD-10-CM

## 2024-12-06 PROCEDURE — 99214 OFFICE O/P EST MOD 30 MIN: CPT | Mod: PBBFAC | Performed by: NURSE PRACTITIONER

## 2024-12-06 RX ORDER — FLUTICASONE PROPIONATE 50 MCG
2 SPRAY, SUSPENSION (ML) NASAL 2 TIMES DAILY
Qty: 16 G | Refills: 11 | Status: SHIPPED | OUTPATIENT
Start: 2024-12-06

## 2024-12-12 ENCOUNTER — HOSPITAL ENCOUNTER (OUTPATIENT)
Facility: HOSPITAL | Age: 49
Discharge: HOME | End: 2024-12-12
Attending: SURGERY | Admitting: SURGERY
Payer: MEDICARE

## 2024-12-12 ENCOUNTER — ANESTHESIA (OUTPATIENT)
Dept: SURGERY | Facility: HOSPITAL | Age: 49
End: 2024-12-12
Payer: MEDICARE

## 2024-12-12 ENCOUNTER — ANESTHESIA EVENT (OUTPATIENT)
Dept: SURGERY | Facility: HOSPITAL | Age: 49
End: 2024-12-12
Payer: MEDICARE

## 2024-12-12 DIAGNOSIS — N62 HYPERTROPHY OF BREAST: ICD-10-CM

## 2024-12-12 DIAGNOSIS — M25.519 ARTHRALGIA OF SHOULDER, UNSPECIFIED LATERALITY: ICD-10-CM

## 2024-12-12 DIAGNOSIS — L98.9 DISORDER OF SKIN AND SUBCUTANEOUS TISSUE: ICD-10-CM

## 2024-12-12 DIAGNOSIS — M54.6 PAIN IN THORACIC SPINE: ICD-10-CM

## 2024-12-12 DIAGNOSIS — M54.2 CERVICALGIA: ICD-10-CM

## 2024-12-12 LAB
B-HCG UR QL: NEGATIVE
CTP QC/QA: YES

## 2024-12-12 PROCEDURE — 63600175 PHARM REV CODE 636 W HCPCS: Performed by: SURGERY

## 2024-12-12 PROCEDURE — 36000706: Performed by: SURGERY

## 2024-12-12 PROCEDURE — 25000003 PHARM REV CODE 250: Performed by: NURSE ANESTHETIST, CERTIFIED REGISTERED

## 2024-12-12 PROCEDURE — 71000033 HC RECOVERY, INTIAL HOUR: Performed by: SURGERY

## 2024-12-12 PROCEDURE — 36000707: Performed by: SURGERY

## 2024-12-12 PROCEDURE — 71000015 HC POSTOP RECOV 1ST HR: Performed by: SURGERY

## 2024-12-12 PROCEDURE — 19318 BREAST REDUCTION: CPT | Mod: 50,AS,GZ, | Performed by: NURSE PRACTITIONER

## 2024-12-12 PROCEDURE — 71000016 HC POSTOP RECOV ADDL HR: Performed by: SURGERY

## 2024-12-12 PROCEDURE — 63600175 PHARM REV CODE 636 W HCPCS: Performed by: ANESTHESIOLOGY

## 2024-12-12 PROCEDURE — 19318 BREAST REDUCTION: CPT | Mod: 50,GZ,, | Performed by: SURGERY

## 2024-12-12 PROCEDURE — 63600175 PHARM REV CODE 636 W HCPCS: Performed by: NURSE ANESTHETIST, CERTIFIED REGISTERED

## 2024-12-12 PROCEDURE — 88305 TISSUE EXAM BY PATHOLOGIST: CPT | Performed by: SURGERY

## 2024-12-12 PROCEDURE — C9290 INJ, BUPIVACAINE LIPOSOME: HCPCS | Performed by: SURGERY

## 2024-12-12 PROCEDURE — 25000003 PHARM REV CODE 250: Performed by: ANESTHESIOLOGY

## 2024-12-12 PROCEDURE — 37000008 HC ANESTHESIA 1ST 15 MINUTES: Performed by: SURGERY

## 2024-12-12 PROCEDURE — 37000009 HC ANESTHESIA EA ADD 15 MINS: Performed by: SURGERY

## 2024-12-12 PROCEDURE — 81025 URINE PREGNANCY TEST: CPT | Performed by: ANESTHESIOLOGY

## 2024-12-12 RX ORDER — IPRATROPIUM BROMIDE AND ALBUTEROL SULFATE 2.5; .5 MG/3ML; MG/3ML
3 SOLUTION RESPIRATORY (INHALATION)
Status: DISCONTINUED | OUTPATIENT
Start: 2024-12-12 | End: 2024-12-12 | Stop reason: HOSPADM

## 2024-12-12 RX ORDER — ROCURONIUM BROMIDE 10 MG/ML
INJECTION, SOLUTION INTRAVENOUS
Status: DISCONTINUED | OUTPATIENT
Start: 2024-12-12 | End: 2024-12-12

## 2024-12-12 RX ORDER — SCOLOPAMINE TRANSDERMAL SYSTEM 1 MG/1
1 PATCH, EXTENDED RELEASE TRANSDERMAL ONCE
Status: DISCONTINUED | OUTPATIENT
Start: 2024-12-12 | End: 2024-12-12 | Stop reason: HOSPADM

## 2024-12-12 RX ORDER — METHOCARBAMOL 100 MG/ML
1000 INJECTION, SOLUTION INTRAMUSCULAR; INTRAVENOUS ONCE AS NEEDED
Status: COMPLETED | OUTPATIENT
Start: 2024-12-12 | End: 2024-12-12

## 2024-12-12 RX ORDER — FENTANYL CITRATE 50 UG/ML
INJECTION, SOLUTION INTRAMUSCULAR; INTRAVENOUS
Status: DISCONTINUED | OUTPATIENT
Start: 2024-12-12 | End: 2024-12-12

## 2024-12-12 RX ORDER — MEPERIDINE HYDROCHLORIDE 25 MG/ML
12.5 INJECTION INTRAMUSCULAR; INTRAVENOUS; SUBCUTANEOUS EVERY 10 MIN PRN
Status: DISCONTINUED | OUTPATIENT
Start: 2024-12-12 | End: 2024-12-12 | Stop reason: HOSPADM

## 2024-12-12 RX ORDER — SODIUM CHLORIDE 9 MG/ML
INJECTION, SOLUTION INTRAVENOUS CONTINUOUS
OUTPATIENT
Start: 2024-12-12

## 2024-12-12 RX ORDER — ONDANSETRON HYDROCHLORIDE 2 MG/ML
INJECTION, SOLUTION INTRAMUSCULAR; INTRAVENOUS
Status: DISCONTINUED | OUTPATIENT
Start: 2024-12-12 | End: 2024-12-12

## 2024-12-12 RX ORDER — PROCHLORPERAZINE EDISYLATE 5 MG/ML
5 INJECTION INTRAMUSCULAR; INTRAVENOUS EVERY 30 MIN PRN
Status: DISCONTINUED | OUTPATIENT
Start: 2024-12-12 | End: 2024-12-12 | Stop reason: HOSPADM

## 2024-12-12 RX ORDER — ONDANSETRON 4 MG/1
8 TABLET, ORALLY DISINTEGRATING ORAL EVERY 6 HOURS PRN
Status: DISCONTINUED | OUTPATIENT
Start: 2024-12-12 | End: 2024-12-12 | Stop reason: HOSPADM

## 2024-12-12 RX ORDER — CEFAZOLIN SODIUM 1 G/3ML
2 INJECTION, POWDER, FOR SOLUTION INTRAMUSCULAR; INTRAVENOUS
OUTPATIENT
Start: 2024-12-12 | End: 2024-12-13

## 2024-12-12 RX ORDER — CYCLOBENZAPRINE HCL 10 MG
10 TABLET ORAL 3 TIMES DAILY PRN
OUTPATIENT
Start: 2024-12-12

## 2024-12-12 RX ORDER — ONDANSETRON HYDROCHLORIDE 2 MG/ML
4 INJECTION, SOLUTION INTRAVENOUS DAILY PRN
Status: DISCONTINUED | OUTPATIENT
Start: 2024-12-12 | End: 2024-12-12 | Stop reason: HOSPADM

## 2024-12-12 RX ORDER — MORPHINE SULFATE 4 MG/ML
2 INJECTION, SOLUTION INTRAMUSCULAR; INTRAVENOUS EVERY 4 HOURS PRN
OUTPATIENT
Start: 2024-12-12

## 2024-12-12 RX ORDER — ACETAMINOPHEN 10 MG/ML
1000 INJECTION, SOLUTION INTRAVENOUS ONCE
Status: COMPLETED | OUTPATIENT
Start: 2024-12-12 | End: 2024-12-12

## 2024-12-12 RX ORDER — PHENYLEPHRINE HYDROCHLORIDE 10 MG/ML
INJECTION INTRAVENOUS
Status: DISCONTINUED | OUTPATIENT
Start: 2024-12-12 | End: 2024-12-12

## 2024-12-12 RX ORDER — LIDOCAINE HYDROCHLORIDE 10 MG/ML
INJECTION, SOLUTION EPIDURAL; INFILTRATION; INTRACAUDAL; PERINEURAL
Status: DISCONTINUED | OUTPATIENT
Start: 2024-12-12 | End: 2024-12-12

## 2024-12-12 RX ORDER — OXYCODONE AND ACETAMINOPHEN 5; 325 MG/1; MG/1
2 TABLET ORAL EVERY 4 HOURS PRN
Status: DISCONTINUED | OUTPATIENT
Start: 2024-12-12 | End: 2024-12-12 | Stop reason: HOSPADM

## 2024-12-12 RX ORDER — BUPIVACAINE 13.3 MG/ML
INJECTION, SUSPENSION, LIPOSOMAL INFILTRATION
Status: DISCONTINUED | OUTPATIENT
Start: 2024-12-12 | End: 2024-12-12 | Stop reason: HOSPADM

## 2024-12-12 RX ORDER — HYDROMORPHONE HYDROCHLORIDE 2 MG/ML
0.4 INJECTION, SOLUTION INTRAMUSCULAR; INTRAVENOUS; SUBCUTANEOUS EVERY 5 MIN PRN
Status: DISCONTINUED | OUTPATIENT
Start: 2024-12-12 | End: 2024-12-12 | Stop reason: HOSPADM

## 2024-12-12 RX ORDER — GLUCAGON 1 MG
1 KIT INJECTION
Status: DISCONTINUED | OUTPATIENT
Start: 2024-12-12 | End: 2024-12-12 | Stop reason: HOSPADM

## 2024-12-12 RX ORDER — ONDANSETRON HYDROCHLORIDE 2 MG/ML
4 INJECTION, SOLUTION INTRAVENOUS EVERY 12 HOURS PRN
OUTPATIENT
Start: 2024-12-12

## 2024-12-12 RX ORDER — PROPOFOL 10 MG/ML
VIAL (ML) INTRAVENOUS
Status: DISCONTINUED | OUTPATIENT
Start: 2024-12-12 | End: 2024-12-12

## 2024-12-12 RX ORDER — HYDROMORPHONE HYDROCHLORIDE 2 MG/ML
0.2 INJECTION, SOLUTION INTRAMUSCULAR; INTRAVENOUS; SUBCUTANEOUS EVERY 5 MIN PRN
Status: DISCONTINUED | OUTPATIENT
Start: 2024-12-12 | End: 2024-12-12 | Stop reason: HOSPADM

## 2024-12-12 RX ORDER — DEXAMETHASONE SODIUM PHOSPHATE 4 MG/ML
INJECTION, SOLUTION INTRA-ARTICULAR; INTRALESIONAL; INTRAMUSCULAR; INTRAVENOUS; SOFT TISSUE
Status: DISCONTINUED | OUTPATIENT
Start: 2024-12-12 | End: 2024-12-12

## 2024-12-12 RX ORDER — MUPIROCIN 20 MG/G
OINTMENT TOPICAL 2 TIMES DAILY
OUTPATIENT
Start: 2024-12-12 | End: 2024-12-17

## 2024-12-12 RX ORDER — SODIUM CHLORIDE, SODIUM GLUCONATE, SODIUM ACETATE, POTASSIUM CHLORIDE AND MAGNESIUM CHLORIDE 30; 37; 368; 526; 502 MG/100ML; MG/100ML; MG/100ML; MG/100ML; MG/100ML
INJECTION, SOLUTION INTRAVENOUS CONTINUOUS
Status: DISCONTINUED | OUTPATIENT
Start: 2024-12-12 | End: 2024-12-12 | Stop reason: HOSPADM

## 2024-12-12 RX ORDER — MIDAZOLAM HYDROCHLORIDE 2 MG/2ML
2 INJECTION, SOLUTION INTRAMUSCULAR; INTRAVENOUS ONCE AS NEEDED
Status: COMPLETED | OUTPATIENT
Start: 2024-12-12 | End: 2024-12-12

## 2024-12-12 RX ORDER — LIDOCAINE HYDROCHLORIDE 10 MG/ML
1 INJECTION, SOLUTION EPIDURAL; INFILTRATION; INTRACAUDAL; PERINEURAL ONCE
Status: DISCONTINUED | OUTPATIENT
Start: 2024-12-12 | End: 2024-12-12 | Stop reason: HOSPADM

## 2024-12-12 RX ORDER — CEFAZOLIN 2 G/1
2 INJECTION, POWDER, FOR SOLUTION INTRAMUSCULAR; INTRAVENOUS
Status: COMPLETED | OUTPATIENT
Start: 2024-12-12 | End: 2024-12-12

## 2024-12-12 RX ORDER — PROCHLORPERAZINE EDISYLATE 5 MG/ML
5 INJECTION INTRAMUSCULAR; INTRAVENOUS EVERY 6 HOURS PRN
OUTPATIENT
Start: 2024-12-12

## 2024-12-12 RX ORDER — BUPIVACAINE 13.3 MG/ML
INJECTION, SUSPENSION, LIPOSOMAL INFILTRATION
Status: DISCONTINUED
Start: 2024-12-12 | End: 2024-12-12 | Stop reason: HOSPADM

## 2024-12-12 RX ORDER — GABAPENTIN 300 MG/1
600 CAPSULE ORAL ONCE
Status: COMPLETED | OUTPATIENT
Start: 2024-12-12 | End: 2024-12-12

## 2024-12-12 RX ADMIN — DEXAMETHASONE SODIUM PHOSPHATE 4 MG: 4 INJECTION, SOLUTION INTRA-ARTICULAR; INTRALESIONAL; INTRAMUSCULAR; INTRAVENOUS; SOFT TISSUE at 11:12

## 2024-12-12 RX ADMIN — HYDROMORPHONE HYDROCHLORIDE 0.4 MG: 2 INJECTION INTRAMUSCULAR; INTRAVENOUS; SUBCUTANEOUS at 01:12

## 2024-12-12 RX ADMIN — FENTANYL CITRATE 50 MCG: 50 INJECTION, SOLUTION INTRAMUSCULAR; INTRAVENOUS at 11:12

## 2024-12-12 RX ADMIN — SCOPOLAMINE 1 PATCH: 1 PATCH TRANSDERMAL at 10:12

## 2024-12-12 RX ADMIN — FENTANYL CITRATE 25 MCG: 50 INJECTION, SOLUTION INTRAMUSCULAR; INTRAVENOUS at 01:12

## 2024-12-12 RX ADMIN — FENTANYL CITRATE 50 MCG: 50 INJECTION, SOLUTION INTRAMUSCULAR; INTRAVENOUS at 12:12

## 2024-12-12 RX ADMIN — SODIUM CHLORIDE, POTASSIUM CHLORIDE, SODIUM LACTATE AND CALCIUM CHLORIDE: 600; 310; 30; 20 INJECTION, SOLUTION INTRAVENOUS at 01:12

## 2024-12-12 RX ADMIN — ROCURONIUM BROMIDE 50 MG: 10 INJECTION, SOLUTION INTRAVENOUS at 11:12

## 2024-12-12 RX ADMIN — HYDROMORPHONE HYDROCHLORIDE 0.4 MG: 2 INJECTION INTRAMUSCULAR; INTRAVENOUS; SUBCUTANEOUS at 02:12

## 2024-12-12 RX ADMIN — ONDANSETRON 4 MG: 2 INJECTION INTRAMUSCULAR; INTRAVENOUS at 01:12

## 2024-12-12 RX ADMIN — LIDOCAINE HYDROCHLORIDE 50 MG: 10 INJECTION, SOLUTION EPIDURAL; INFILTRATION; INTRACAUDAL; PERINEURAL at 11:12

## 2024-12-12 RX ADMIN — ACETAMINOPHEN 1000 MG: 10 INJECTION, SOLUTION INTRAVENOUS at 10:12

## 2024-12-12 RX ADMIN — SUGAMMADEX 200 MG: 100 INJECTION, SOLUTION INTRAVENOUS at 01:12

## 2024-12-12 RX ADMIN — CEFAZOLIN 2 G: 2 INJECTION, POWDER, FOR SOLUTION INTRAMUSCULAR; INTRAVENOUS at 11:12

## 2024-12-12 RX ADMIN — METHOCARBAMOL 1000 MG: 100 INJECTION INTRAMUSCULAR; INTRAVENOUS at 01:12

## 2024-12-12 RX ADMIN — PROPOFOL 175 MG: 10 INJECTION, EMULSION INTRAVENOUS at 11:12

## 2024-12-12 RX ADMIN — GABAPENTIN 300 MG: 300 CAPSULE ORAL at 10:12

## 2024-12-12 RX ADMIN — MIDAZOLAM HYDROCHLORIDE 2 MG: 1 INJECTION, SOLUTION INTRAMUSCULAR; INTRAVENOUS at 10:12

## 2024-12-12 RX ADMIN — PHENYLEPHRINE HYDROCHLORIDE 100 MCG: 10 INJECTION INTRAVENOUS at 11:12

## 2024-12-12 RX ADMIN — SODIUM CHLORIDE, POTASSIUM CHLORIDE, SODIUM LACTATE AND CALCIUM CHLORIDE: 600; 310; 30; 20 INJECTION, SOLUTION INTRAVENOUS at 11:12

## 2024-12-12 NOTE — PLAN OF CARE
VSS, simi score  9, pt arousable and ready for transfer to [facility] per Dr Andrade.

## 2024-12-12 NOTE — TRANSFER OF CARE
"Anesthesia Transfer of Care Note    Patient: Beverly Lee    Procedure(s) Performed: Procedure(s) (LRB):  MAMMOPLASTY, REDUCTION, BILATERAL (bilateral breast reduction with free nipple grafts) (Bilateral)    Patient location: PACU    Anesthesia Type: general    Transport from OR: Transported from OR on room air with adequate spontaneous ventilation    Post pain: adequate analgesia    Post assessment: no apparent anesthetic complications    Post vital signs: stable    Level of consciousness: responds to stimulation    Nausea/Vomiting: no nausea/vomiting    Complications: none    Transfer of care protocol was followed      Last vitals: Visit Vitals  /84   Pulse 76   Temp 36.8 °C (98.2 °F) (Oral)   Resp 20   Ht 5' 2" (1.575 m)   Wt 87.8 kg (193 lb 9 oz)   LMP 11/19/2024 (Exact Date)   SpO2 99%   Breastfeeding No   BMI 35.40 kg/m²     "

## 2024-12-12 NOTE — ANESTHESIA POSTPROCEDURE EVALUATION
Anesthesia Post Evaluation    Patient: Beverly Lee    Procedure(s) Performed: Procedure(s) (LRB):  MAMMOPLASTY, REDUCTION, BILATERAL (bilateral breast reduction with free nipple grafts) (Bilateral)    Final Anesthesia Type: general      Patient location during evaluation: floor  Patient participation: Yes- Able to Participate  Level of consciousness: awake and alert  Post-procedure vital signs: reviewed and stable  Pain management: adequate  Airway patency: patent    PONV status at discharge: No PONV  Anesthetic complications: no      Cardiovascular status: blood pressure returned to baseline  Respiratory status: spontaneous ventilation and room air  Hydration status: euvolemic  Follow-up not needed.              Vitals Value Taken Time   /73 12/12/24 1622   Temp 36.4 °C (97.5 °F) 12/12/24 1352   Pulse 62 12/12/24 1622   Resp 16 12/12/24 1622   SpO2 96 % 12/12/24 1622         Event Time   Out of Recovery 14:49:00         Pain/Vero Score: Pain Rating Prior to Med Admin: 6 (12/12/2024  1:55 PM)  Vero Score: 10 (12/12/2024  5:00 PM)  Modified Vero Score: 20 (12/12/2024  5:00 PM)

## 2024-12-12 NOTE — ANESTHESIA PROCEDURE NOTES
Intubation    Date/Time: 12/12/2024 11:33 AM    Performed by: Rell Ndiaye CRNA  Authorized by: Noble Hancock Jr., MD    Intubation:     Induction:  Intravenous    Intubated:  Postinduction    Mask Ventilation:  Easy mask    Attempts:  1    Attempted By:  CRNA    Method of Intubation:  Direct    Blade:  Lundberg 2    Laryngeal View Grade: Grade IIA - cords partially seen      Difficult Airway Encountered?: No      Complications:  None    Airway Device:  Oral endotracheal tube    Airway Device Size:  6.5    Style/Cuff Inflation:  Cuffed    Tube secured:  21    Secured at:  The lips    Placement Verified By:  Capnometry    Complicating Factors:  None    Findings Post-Intubation:  BS equal bilateral and atraumatic/condition of teeth unchanged

## 2024-12-12 NOTE — ANESTHESIA PREPROCEDURE EVALUATION
"                                                                                                             12/12/2024  Beverly Lee is a 49 y.o., female with PONV presents as an outpatient for bilateral mammoplasty.  Cleared by cardiology    Last 3 sets of Vitals        12/6/2024     9:39 AM 12/10/2024    12:24 PM 12/12/2024     9:10 AM   Vitals - 1 value per visit   SYSTOLIC 125  128   DIASTOLIC 83  84   Pulse 97  76   Temp 36.7 °C (98 °F)  36.8 °C (98.2 °F)   SPO2   99 %   Weight (lb)  172    Weight (kg)  78.019    Height  5' 2" (1.575 m)    BMI (Calculated)  31.5    Pain Score Zero           Lab Results   Component Value Date    WBC 6.31 10/22/2024    HGB 11.3 (L) 10/22/2024    HCT 34.7 (L) 10/22/2024    MCV 94.0 10/22/2024     10/22/2024            Chemistry        Component Value Date/Time     10/22/2024 0850     12/15/2023 0723    K 3.7 10/22/2024 0850    K 3.9 12/15/2023 0723     (H) 10/22/2024 0850     12/15/2023 0723    CO2 26 10/22/2024 0850    CO2 25 12/15/2023 0723    BUN 13.7 10/22/2024 0850    BUN 11 12/15/2023 0723    CREATININE 0.80 10/22/2024 0850    CREATININE 0.73 12/15/2023 0723        Component Value Date/Time    CALCIUM 8.8 10/22/2024 0850    CALCIUM 7.9 (L) 12/15/2023 0723    ALKPHOS 64 10/22/2024 0850    AST 15 10/22/2024 0850    ALT 7 10/22/2024 0850    BILITOT 0.4 10/22/2024 0850    ESTGFRAFRICA 103 12/15/2023 0723    EGFRNONAA >60 01/24/2022 1433        Pre-op Assessment    I have reviewed the Patient Summary Reports.    I have reviewed the NPO Status.   I have reviewed the Medications.     Review of Systems  Anesthesia Hx:              Personal Hx of Anesthesia complications, Post-Operative Nausea/Vomiting                    Social:  Non-Smoker       Hematology/Oncology:       -- Anemia:                                  Cardiovascular:     Hypertension                  Functional Capacity good / => 4 METS                         Endocrine:        " Obesity / BMI > 30      Physical Exam  General: Well nourished, Cooperative, Alert and Oriented    Airway:  Mallampati: I   Mouth Opening: Normal  TM Distance: Normal  Tongue: Normal  Neck ROM: Normal ROM    Dental:  Intact    Chest/Lungs:  Clear to auscultation, Normal Respiratory Rate    Heart:  Rate: Normal  Rhythm: Regular Rhythm        Anesthesia Plan  Type of Anesthesia, risks & benefits discussed:    Anesthesia Type: Gen ETT  Intra-op Monitoring Plan: Standard ASA Monitors  Post Op Pain Control Plan: multimodal analgesia and IV/PO Opioids PRN  Induction:  IV  Airway Plan: Direct  Informed Consent: Informed consent signed with the Patient and all parties understand the risks and agree with anesthesia plan.  All questions answered.   ASA Score: 2  Day of Surgery Review of History & Physical: H&P Update referred to the surgeon/provider.    Ready For Surgery From Anesthesia Perspective.     .

## 2024-12-12 NOTE — DISCHARGE SUMMARY
[facility] Surgical - Periop Services  Discharge Note  Short Stay    Procedure(s) (LRB):  MAMMOPLASTY, REDUCTION, BILATERAL (bilateral breast reduction with free nipple grafts) (Bilateral)      OUTCOME: Patient tolerated treatment/procedure well without complication and is now ready for discharge.    DISPOSITION: Home or Self Care    FINAL DIAGNOSIS:  <principal problem not specified>    FOLLOWUP: In clinic    DISCHARGE INSTRUCTIONS:  No discharge procedures on file.     TIME SPENT ON DISCHARGE:   5 minutes

## 2024-12-13 VITALS
WEIGHT: 193.56 LBS | OXYGEN SATURATION: 96 % | RESPIRATION RATE: 16 BRPM | DIASTOLIC BLOOD PRESSURE: 73 MMHG | BODY MASS INDEX: 35.62 KG/M2 | HEIGHT: 62 IN | TEMPERATURE: 98 F | SYSTOLIC BLOOD PRESSURE: 119 MMHG | HEART RATE: 62 BPM

## 2024-12-13 NOTE — OP NOTE
SURGEON:  Jhon Lal MD     ANESTHESIA:  General.     ASSISTANT:  Heather Hansen, nurse practitioner, who was essential for deep retraction, assistance and facilitating the procedure and closure.    Howard Aviles MD. Surgery Resident     PREOPERATIVE DIAGNOSIS:  Macromastia.     POSTOPERATIVE DIAGNOSIS:  Macromastia.     PROCEDURE PERFORMED:  Bilateral reduction mammoplasty.     SPECIMENS:  Right and left breast tissue sent to Surgical Pathology.  Please see the report for official weights.     INDICATION FOR PROCEDURE:  This patient presented with very symptomatic macromastia.  Options were discussed with her and she elected to proceed with bilateral reduction mammoplasty.  Risks, benefits, potential complications of surgery were discussed in detail.  She signed the informed consent.    We discussed complications including wound healing issues, tissue necrosis, asymmetry, sensation changes, breast feeding issues and other complications in detail     DESCRIPTION OF PROCEDURE:  The patient was identified in the preoperative holding area.  She was marked for a Keenan pattern reduction mammoplasty.  She was   taken to the operating room, placed in supine position.  General endotracheal anesthesia was provided.  She was prepped and draped in a sterile surgical   fashion.  Time-out was taken and everyone in agreement.  I initially started the procedure.  I used a 42 mm template around the right nipple-areolar complex and   de-epithelialized the inferior pedicle.  I then incised our markings with a 10 blade, 2 cm thick flaps were raised superiorly down to the chest wall.    Electrocautery was used to remove the breast tissue medial, lateral and posterior to the pedicle.  Hemostasis was ensured.  Exparel was injected   throughout the field and the wound was temporarily tacked, closed with staples.  I then performed the exact same procedure on the patient's left breast.  The   inferior pedicle was de-epithelialized,  maintaining nipple-areolar complex at 42 mm template.  The markings were incised and using electrocautery, I elevated 2   cm thick flaps superiorly down to the chest wall.  Electrocautery was used to remove breast tissue posterior, medial, lateral to the pedicle.  Hemostasis was   ensured.  Exparel was injected throughout the field.  The specimens were passed off the field and weighed.  The patient was evaluated in upright and supine   positions for symmetry, was noted to be good.  Area of nipple-areolar complex translocation was marked out with 42 mm template on both sides and skin and   subcutaneous tissue removed from these areas and added to the specimens.  We then proceeded with closure with 0 Vicryl sutures in deep dermis along the   vertical and horizontal incisions, 2-0 subcuticular Stratafix along the IMF incisions, 3-0 Vicryl to inset the nipple-areolar complex and 3-0 subcuticular   Stratafix around the nipple-areolar complex and down the vertical incisions.  Prineo was used as a dressing.  There were no complications.  All counts were correct.

## 2024-12-16 LAB — PSYCHE PATHOLOGY RESULT: NORMAL

## 2024-12-26 ENCOUNTER — OFFICE VISIT (OUTPATIENT)
Dept: ORTHOPEDICS | Facility: CLINIC | Age: 49
End: 2024-12-26
Payer: MEDICARE

## 2024-12-26 VITALS
WEIGHT: 193.56 LBS | BODY MASS INDEX: 35.62 KG/M2 | SYSTOLIC BLOOD PRESSURE: 120 MMHG | HEART RATE: 65 BPM | DIASTOLIC BLOOD PRESSURE: 71 MMHG | HEIGHT: 62 IN

## 2024-12-26 DIAGNOSIS — M17.11 PRIMARY OSTEOARTHRITIS OF RIGHT KNEE: Primary | ICD-10-CM

## 2024-12-26 RX ORDER — LIDOCAINE HYDROCHLORIDE 20 MG/ML
3 INJECTION, SOLUTION INFILTRATION; PERINEURAL
Status: DISCONTINUED | OUTPATIENT
Start: 2024-12-26 | End: 2024-12-26 | Stop reason: HOSPADM

## 2024-12-26 RX ADMIN — LIDOCAINE HYDROCHLORIDE 3 ML: 20 INJECTION, SOLUTION INFILTRATION; PERINEURAL at 09:12

## 2024-12-26 NOTE — PROGRESS NOTES
[facility]  Orthopedic Clinic Note      Chief Complaint:   Chief Complaint   Patient presents with    Right Knee - Injections    Injections     Right knee synvisc injection, states like the last one is starting to wear off now.     Referring Physician: Phil Collins MD      History of Present Illness:    This is a 48-year-old female well known to our clinic who presents for re-evaluation of right knee pain.  She has been treated extensively for right knee osteoarthritis.  This knee pain is moderate in severity.  Patient states pain is worse with ambulation and activity, especially squats and lunges.  Patient states pain is global.  Patient has been treated previously with prescribed anti-inflammatories and topical medications, multiple corticosteroid and viscosupplementation injections, and significant weight loss.  She received a right knee viscosupplementation injection approximately 6 months ago and reports that it alleviated most of her symptoms.  She would like to proceed with repeat viscosupplementation injection today.  She also complains of new onset left wrist pain worsening over the last few weeks.  Significant pain over the radial aspect of the wrist with no associated injury or trauma.  Pain worsens with ulnar deviation of the wrist.  She has attempted her previously prescribed ibuprofen and rest with no improvement in her symptoms.  09/19/2024 she comes in with continued symptoms today.  She wants to schedule another Synvisc injection.  12/26/2024 patient presents for right knee viscosupplementation injection.  She states that she was doing very well, but has slowly noticed her symptoms returning over the last few weeks.      Past Medical History:   Diagnosis Date    Abnormal Pap smear of cervix     Acute cystitis with hematuria 07/26/2023    ADHD     Anxiety and depression     Arthritis     BMI 36.0-36.9,adult 07/26/2023    Bowel incontinence     Breast pain, left 04/22/2024    Carpal tunnel  syndrome, right     Gastritis     Hypertension     Respiratory distress  with anesthesia     Seasonal allergies     Vaginal fistula        Past Surgical History:   Procedure Laterality Date    carpal tunnel released Right      SECTION      CHOLECYSTECTOMY      COLONOSCOPY, WITH POLYPECTOMY USING HOT BIOPSY FORCEPS N/A 2024    Procedure: COLONOSCOPY, WITH POLYPECTOMY USING HOT BIOPSY FORCEPS;  Surgeon: SUKUMAR Quinones MD;  Location: [facility] ENDOSCOPY;  Service: Gastroenterology;  Laterality: N/A;  Cold forceps    COLONOSCOPY, WITH POLYPECTOMY USING SNARE N/A 2024    Procedure: COLONOSCOPY, WITH POLYPECTOMY USING SNARE;  Surgeon: SUKUMAR Quinones MD;  Location: [facility] ENDOSCOPY;  Service: Gastroenterology;  Laterality: N/A;    gastric sleeve  2022    PENECTOMY, TOTAL, RADICAL, WITH LYMPHADENECTOMY      RECTAL SURGERY      REDUCTION OF BOTH BREASTS Bilateral 2024    Procedure: MAMMOPLASTY, REDUCTION, BILATERAL (bilateral breast reduction with free nipple grafts);  Surgeon: Jhon Lal MD;  Location: [facility];  Service: Plastics;  Laterality: Bilateral;  468 grams removed from right breast, 412 grams removed from left breast    REPAIR OF ACHILLES TENDON Right     STIMULATOR PLACEMENT AND REMOVAL      STATES TO STIMULATE BOWELS    TONSILLECTOMY      VAGINA SURGERY      vaginal fistula    WISDOM TOOTH EXTRACTION         Current Outpatient Medications   Medication Sig    calcium-vitamin D3 (OS-FIGUEROA 500 + D3) 500 mg-5 mcg (200 unit) per tablet Take 1 tablet by mouth.    cetirizine (ZYRTEC) 10 MG tablet Take 10 mg by mouth daily as needed.    cholestyramine (QUESTRAN) 4 gram packet Take 1 packet (4 g total) by mouth 3 (three) times daily with meals.    clindamycin (CLEOCIN T) 1 % Swab SMARTSI Topical Every Morning    clotrimazole (LOTRIMIN) 1 % cream Apply topically 2 (two) times daily.    dextroamphetamine-amphetamine (ADDERALL XR) 30 MG 24 hr capsule Take 30 mg by mouth every morning.     diclofenac sodium (VOLTAREN) 1 % Gel APPLY TOPICALLY TO THE AFFECTED AREA FOUR TIMES DAILY AS NEEDED FOR PAIN    finasteride (PROSCAR) 5 mg tablet Take 5 mg by mouth once daily.    fluticasone propionate (FLONASE) 50 mcg/actuation nasal spray 2 sprays (100 mcg total) by Each Nostril route 2 (two) times a day. prn    ibuprofen (ADVIL,MOTRIN) 800 MG tablet Take 1 tablet (800 mg total) by mouth 3 (three) times daily.    LINZESS 145 mcg Cap capsule Take 145 mcg by mouth once daily.    minoxidiL (LONITEN) 2.5 MG tablet Take 1.25 mg by mouth once daily.    MV-MN-IRON FUM-FA-OMEGA3,6,9#3 ORAL Take 1 tablet by mouth once daily.    nystatin-triamcinolone (MYCOLOG II) cream Apply topically 2 (two) times daily as needed.    omeprazole (PRILOSEC) 20 MG capsule Take 20 mg by mouth once daily.    ondansetron (ZOFRAN-ODT) 4 MG TbDL Take 4 mg by mouth every 8 (eight) hours as needed.    PAXIL 30 mg tablet Take 30 mg by mouth every morning.    sucralfate (CARAFATE) 1 gram tablet Take 1 g by mouth 4 (four) times daily.    traZODone (DESYREL) 50 MG tablet Take  mg by mouth nightly.    aluminum & magnesium hydroxide-simethicone (MYLANTA MAX STRENGTH) 400-400-40 mg/5 mL suspension Take 5 mLs by mouth every 6 (six) hours as needed.    LIDOcaine-diphenhyd-Al-mag-sim (FIRST-MOUTHWASH BLM) -155-40 mg/30mL mouthwash suspension Swish and spit 10 mLs 3 (three) times daily. (Patient not taking: Reported on 12/26/2024)     No current facility-administered medications for this visit.       Review of patient's allergies indicates:   Allergen Reactions    Macrolide antibiotics Palpitations    Nitrofurantoin Palpitations       Family History   Problem Relation Name Age of Onset    COPD Mother Naomy rafael     Arthritis Mother Naomy rafael     Lung cancer Mother Naomy rafael     Hypertension Mother Naomy rafael     Hyperlipidemia Father Naomy rafael     Rheum arthritis Father Naomy rafael     Colon cancer Father Naomy rafael     Rheum  "arthritis Sister      ALS Paternal Uncle      Parkinsonism Paternal Uncle         Social History     Socioeconomic History    Marital status: Single    Number of children: 3   Tobacco Use    Smoking status: Never    Smokeless tobacco: Never   Substance and Sexual Activity    Alcohol use: Not Currently    Drug use: Never    Sexual activity: Yes     Partners: Male   Social History Narrative    ** Merged History Encounter **          Social Drivers of Health     Financial Resource Strain: Low Risk  (9/4/2024)    Overall Financial Resource Strain (CARDIA)     Difficulty of Paying Living Expenses: Not hard at all   Food Insecurity: No Food Insecurity (9/4/2024)    Hunger Vital Sign     Worried About Running Out of Food in the Last Year: Never true     Ran Out of Food in the Last Year: Never true   Transportation Needs: No Transportation Needs (8/7/2024)    TRANSPORTATION NEEDS     Transportation : No   Physical Activity: Sufficiently Active (8/7/2024)    Exercise Vital Sign     Days of Exercise per Week: 3 days     Minutes of Exercise per Session: 50 min   Stress: Stress Concern Present (8/7/2024)    [employer] - Occupational Stress Questionnaire     Feeling of Stress : Very much   Housing Stability: Low Risk  (9/4/2024)    Housing Stability Vital Sign     Unable to Pay for Housing in the Last Year: No     Homeless in the Last Year: No           Review of Systems:  All review of systems negative except for those stated in the HPI.    Examination:    Vital Signs:    Vitals:    12/26/24 0944   BP: 120/71   Pulse: 65   Weight: 87.8 kg (193 lb 9 oz)   Height: 5' 2" (1.575 m)   PainSc:   4           Body mass index is 35.4 kg/m².    Physical Examination:  General: Well-developed, well-nourished.  Neuro: Alert and oriented x 3.  Psych: Normal mood and affect.  Card: Regular rate and rhythm  Resp: Unlabored and quiet.  Right knee Exam:  No obvious deformity. Range of motion from 0-130 degrees. " Increased subluxation of the kneecap medially and laterally greater than 1 cm. Negative patella tendon tenderness. Negative J sign. Negative Lachman and anterior drawer test. Negative posterior drawer test. Negative varus and valgus stress test. Negative medial joint line tenderness. Negative lateral joint line tenderness. 4/5 strength and normal skin appearance. Sensibility normal.      Imaging: Four views of the right knee demonstrate degenerative changes with joint space narrowing, particularly in the medial aspect of the tibial femoral compartment and the patellofemoral compartment of the knee.      Assessment: Primary osteoarthritis of right knee  -     Large Joint Aspiration/Injection: R knee      Plan:  Plan to proceed with repeat right knee viscosupplementation injection today.  Over-the-counter medications as needed for pain.  Home exercise program with activity modification as necessary.  Encouraged ice application, rest, elevation as needed for swelling.  She will return to clinic in approximately 3-4 months for re-evaluation.  She verbalized understanding of the plan of care with no further questions.      Large Joint Aspiration/Injection: R knee    Date/Time: 12/26/2024 9:45 AM    Performed by: Ayla Todd FNP  Authorized by: Ayla Todd FNP    Consent Done?:  Yes (Verbal)  Indications:  Pain  Site marked: the procedure site was marked    Timeout: prior to procedure the correct patient, procedure, and site was verified    Prep: patient was prepped and draped in usual sterile fashion      Local anesthesia used?: Yes    Local anesthetic:  Topical anesthetic  Ultrasonic Guidance for needle placement?: No    Approach:  Superior (superolateral)  Location:  Knee  Site:  R knee  Medications:  48 mg hylan g-f 20 48 mg/6 mL; 3 mL LIDOcaine HCL 20 mg/ml (2%) 20 mg/mL (2 %)  Patient tolerance:  Patient tolerated the procedure well with no immediate complications      Follow up in about 3 months  (around 3/26/2025) for Reevaluation.      DISCLAIMER: This note may have been dictated using voice recognition software and may contain grammatical errors.     NOTE: Consult report sent to referring provider via EP[employer] EMR.

## 2024-12-26 NOTE — PROCEDURES
Large Joint Aspiration/Injection: R knee    Date/Time: 12/26/2024 9:45 AM    Performed by: Ayla Todd FNP  Authorized by: Ayla Todd FNP    Consent Done?:  Yes (Verbal)  Indications:  Pain  Site marked: the procedure site was marked    Timeout: prior to procedure the correct patient, procedure, and site was verified    Prep: patient was prepped and draped in usual sterile fashion      Local anesthesia used?: Yes    Local anesthetic:  Topical anesthetic  Ultrasonic Guidance for needle placement?: No    Approach:  Superior (superolateral)  Location:  Knee  Site:  R knee  Medications:  48 mg hylan g-f 20 48 mg/6 mL; 3 mL LIDOcaine HCL 20 mg/ml (2%) 20 mg/mL (2 %)  Patient tolerance:  Patient tolerated the procedure well with no immediate complications

## 2025-01-06 ENCOUNTER — CLINICAL SUPPORT (OUTPATIENT)
Dept: AUDIOLOGY | Facility: HOSPITAL | Age: 50
End: 2025-01-06
Payer: MEDICARE

## 2025-01-06 DIAGNOSIS — H91.90 HEARING DISORDER, UNSPECIFIED LATERALITY: ICD-10-CM

## 2025-01-06 PROCEDURE — 92557 COMPREHENSIVE HEARING TEST: CPT | Performed by: AUDIOLOGIST

## 2025-01-06 PROCEDURE — 92567 TYMPANOMETRY: CPT | Performed by: AUDIOLOGIST

## 2025-01-06 NOTE — PROGRESS NOTES
Hearing Evaluation      Patient History:Ms. Lee is in for a hearing evaluation reporting a subjective hearing loss, onset unknown. She denies tinnitus, vertigo, or middle ear issues. All additional history is unremarkable.      Test Results:       Pure Tone Testing:     Right ear:   Normal peripheral hearing sensitivity from 250-8kHz. Speech reception threshold is in agreement with puretone findings.  Discrimination score of 100% is considered excellent.      Left ear: Normal peripheral hearing sensitivity from 250-8kHz. Speech reception threshold is in agreement with puretone findings.  Discrimination score of 100% is considered excellent.         Tympanometry:        Right ear:   Type 'A' tymp, normal middle ear pressure/function    Left ear: Type 'A' tymp, normal middle ear pressure/function       Distortion Product Otoacoustic Emission Testing (DPOAE):         Right ear: Present emissions from 1k-6kHz      Left ear: Present emissions from 1k-6kHz         Interpretations:     Behavioral test findings indicate hearing within normal limits, bilaterally. Speech reception thresholds obtained at 10dB, AU, and are in agreement with puretone findings. Speech discrimination scores of 100%, AU, are considered excellent.  DPOAE findings indicate normal cochlear outer hair cell function, bilaterally. Immittance measures indicate normal middle ear pressure/function, bilaterally.         Recommendations:    Continue with ENT follow up  RTC PRN with audiology

## 2025-01-10 ENCOUNTER — TELEPHONE (OUTPATIENT)
Dept: INTERNAL MEDICINE | Facility: CLINIC | Age: 50
End: 2025-01-10
Payer: MEDICARE

## 2025-01-10 ENCOUNTER — OFFICE VISIT (OUTPATIENT)
Dept: URGENT CARE | Facility: CLINIC | Age: 50
End: 2025-01-10
Payer: MEDICARE

## 2025-01-10 ENCOUNTER — HOSPITAL ENCOUNTER (OUTPATIENT)
Dept: RADIOLOGY | Facility: HOSPITAL | Age: 50
Discharge: HOME | End: 2025-01-10
Payer: MEDICARE

## 2025-01-10 VITALS
DIASTOLIC BLOOD PRESSURE: 84 MMHG | HEART RATE: 71 BPM | WEIGHT: 199 LBS | BODY MASS INDEX: 36.62 KG/M2 | HEIGHT: 62 IN | OXYGEN SATURATION: 99 % | SYSTOLIC BLOOD PRESSURE: 138 MMHG | RESPIRATION RATE: 16 BRPM | TEMPERATURE: 99 F

## 2025-01-10 DIAGNOSIS — R18.8 OTHER ASCITES: ICD-10-CM

## 2025-01-10 DIAGNOSIS — K46.9 HERNIA OF ABDOMINAL CAVITY: Primary | ICD-10-CM

## 2025-01-10 PROCEDURE — 71046 X-RAY EXAM CHEST 2 VIEWS: CPT | Mod: TC

## 2025-01-10 PROCEDURE — 99215 OFFICE O/P EST HI 40 MIN: CPT | Mod: PBBFAC,25

## 2025-01-10 PROCEDURE — 74019 RADEX ABDOMEN 2 VIEWS: CPT | Mod: TC

## 2025-01-10 NOTE — TELEPHONE ENCOUNTER
----- Message from LisbethSafehis sent at 1/9/2025  3:20 PM CST -----  Who Called: Beverly Lee    Patient is returning phone call    Who Left Message for Patient:  Does the patient know what this is regarding?:      Preferred Method of Contact: Phone Call  Patient's Preferred Phone Number on File: [phone]   Best Call Back Number, if different:  Additional Information: pt called stating has swelling in stomache pls advise

## 2025-01-10 NOTE — TELEPHONE ENCOUNTER
Spoke to pt . Pt stated she had a breast lift on 12/12/24. States she has been experiencing increased abdominal swelling /weight gain for the last 4-5 days. Says the swelling is located approximately 1/2 inch below her incisions and goes down to her navel . States she has noticed dimpling in areas of her abdomen as well and has been taking pictures daily and noted there's an increase in the size of her abdomen. . Pt states she visited the plastic surgeon's office on yesterday ;was informed she should contact her PCP and the swelling does not seem to be related to the sx. Advised ER for evaluation  and scheduled pt for next available.  Pt voiced understanding and stated she will visit ER today .

## 2025-01-10 NOTE — PROGRESS NOTES
"Subjective:       Patient ID: Beverly Lee is a 49 y.o. female.    Vitals:  height is 5' 2" (1.575 m) and weight is 90.3 kg (199 lb). Her temperature is 98.6 °F (37 °C). Her blood pressure is 138/84 and her pulse is 71. Her respiration is 16 and oxygen saturation is 99%.     Chief Complaint: Edema (Pt states edema to abd for the last couple of days. Has reduced some since yesterday. Breast reduction with reconstruction 12/12/24. Denies any pain.  States last BM 3 days PTA. Irregular.)    49-year-old female reports to the clinic with complaints abdominal edema that began a few days ago.  Patient states that the edema has reduced some since yesterday.  Patient had a breast reduction and reconstruction on 12/12/24 and reports that she has had over accumulation of fluid with surgeries in the past and has had to have surgical drains put back in because of this issue.  Patient states she tried to follow-up with plastic surgeon and primary care provider but was unable to get appointments with them.  Patient denies any pain, nausea, vomiting, diarrhea, bloody stools, dysuria, painful bowel movements.  Patient states that her last bowel movement was 3 days ago and that she normally has irregular bowel movements that she takes Linzess for daily.  Patient denies fever.  Patient states that abdomen is not painful, but that she does feel an increase in pressure when sitting upright.    All other systems are negative    Chart reviewed    Objective:   Physical Exam   Constitutional: She is oriented to person, place, and time. She appears well-developed.   HENT:   Head: Normocephalic and atraumatic.   Ears:   Right Ear: External ear normal.   Left Ear: External ear normal.   Nose: Nose normal.   Mouth/Throat: Mucous membranes are normal.   Eyes: Conjunctivae and lids are normal.   Neck: Trachea normal. Neck supple.   Cardiovascular: Normal rate, regular rhythm and normal heart sounds.   Pulmonary/Chest: Effort normal and breath " sounds normal. No respiratory distress.   Abdominal: Normal appearance and bowel sounds are normal. She exhibits distension. She exhibits no mass. Soft. There is no abdominal tenderness. There is no rebound, no guarding, no tenderness at McBurney's point, no left CVA tenderness and no right CVA tenderness. A hernia is present. Hernia confirmed positive in the umbilical area.      Comments: Bulging pouch present to upper abdomen, consistent with umbilical hernia, pouch soft to touch and nonpainful.  Breast reduction surgery scars edges well approximated, no drainage, edema, or erythema present.     Musculoskeletal: Normal range of motion.         General: Normal range of motion.   Neurological: She is alert and oriented to person, place, and time. She has normal strength.   Skin: Skin is warm, dry, intact, not diaphoretic and not pale.   Psychiatric: Her speech is normal and behavior is normal. Judgment and thought content normal.   Nursing note and vitals reviewed.      Assessment:     1. Other ascites          XR ABDOMEN FLAT AND ERECT    Result Date: 1/10/2025  EXAMINATION: XR ABDOMEN FLAT AND ERECT CLINICAL HISTORY: Other ascites COMPARISON: 17 April 2012 FINDINGS: Flat and upright views of the abdomen.  There is a nonspecific, nonobstructive bowel gas pattern.  No large stool burden moderate stool scattered in the colon.  No free air.  There are cholecystectomy clips.     No acute radiographic findings. Electronically signed by: Douglas Ratliff Date:    01/10/2025 Time:    10:53    XR CHEST PA AND LATERAL    Result Date: 1/10/2025  EXAMINATION: XR CHEST PA AND LATERAL CLINICAL HISTORY: Other ascites TECHNIQUE: Two-view COMPARISON: August 2, 2021. FINDINGS: Cardiopericardial silhouette is within normal limits. Lungs are without dense focal or segmental consolidation, congestion, pleural effusion or pneumothorax.     No acute cardiopulmonary process identified. Electronically signed by: Rob Mosher  Date:    01/10/2025 Time:    10:46     Plan:     Follow-up with surgeon who performed 1st hernia repair for CT scan and evaluation of hernia  Discussed ER precautions and when to go to the ER if swelling worsens  Discussed importance of follow-up with plastic surgeon as instructed  Follow-up with primary care provider for next appointment    Other ascites  -     XR ABDOMEN FLAT AND ERECT; Future; Expected date: 01/10/2025  -     XR CHEST PA AND LATERAL; Future; Expected date: 01/10/2025        Please note: This chart was completed via voice to text dictation. It may contain typographical/word recognition errors. If there are any questions, please contact the provider for final clarification.

## 2025-01-28 ENCOUNTER — OFFICE VISIT (OUTPATIENT)
Dept: INTERNAL MEDICINE | Facility: CLINIC | Age: 50
End: 2025-01-28
Payer: MEDICARE

## 2025-01-28 VITALS
DIASTOLIC BLOOD PRESSURE: 78 MMHG | TEMPERATURE: 98 F | BODY MASS INDEX: 36.44 KG/M2 | HEIGHT: 62 IN | WEIGHT: 198 LBS | SYSTOLIC BLOOD PRESSURE: 135 MMHG | RESPIRATION RATE: 20 BRPM | HEART RATE: 83 BPM | OXYGEN SATURATION: 100 %

## 2025-01-28 DIAGNOSIS — D50.9 IRON DEFICIENCY ANEMIA, UNSPECIFIED IRON DEFICIENCY ANEMIA TYPE: ICD-10-CM

## 2025-01-28 DIAGNOSIS — R19.00 ABDOMINAL SWELLING: Primary | ICD-10-CM

## 2025-01-28 DIAGNOSIS — K76.0 HEPATIC STEATOSIS: ICD-10-CM

## 2025-01-28 DIAGNOSIS — D73.4 SPLENIC CYST: ICD-10-CM

## 2025-01-28 DIAGNOSIS — E53.8 DEFICIENCY OF OTHER SPECIFIED B GROUP VITAMINS: ICD-10-CM

## 2025-01-28 DIAGNOSIS — Z98.84 S/P BARIATRIC SURGERY: ICD-10-CM

## 2025-01-28 PROCEDURE — 99214 OFFICE O/P EST MOD 30 MIN: CPT | Mod: S$PBB,,, | Performed by: NURSE PRACTITIONER

## 2025-01-28 PROCEDURE — 99215 OFFICE O/P EST HI 40 MIN: CPT | Mod: PBBFAC | Performed by: NURSE PRACTITIONER

## 2025-01-28 PROCEDURE — 3075F SYST BP GE 130 - 139MM HG: CPT | Mod: CPTII,,, | Performed by: NURSE PRACTITIONER

## 2025-01-28 PROCEDURE — 3008F BODY MASS INDEX DOCD: CPT | Mod: CPTII,,, | Performed by: NURSE PRACTITIONER

## 2025-01-28 PROCEDURE — 1159F MED LIST DOCD IN RCRD: CPT | Mod: CPTII,,, | Performed by: NURSE PRACTITIONER

## 2025-01-28 PROCEDURE — 1160F RVW MEDS BY RX/DR IN RCRD: CPT | Mod: CPTII,,, | Performed by: NURSE PRACTITIONER

## 2025-01-28 PROCEDURE — 3078F DIAST BP <80 MM HG: CPT | Mod: CPTII,,, | Performed by: NURSE PRACTITIONER

## 2025-01-28 RX ORDER — CEPHALEXIN 500 MG/1
500 CAPSULE ORAL 4 TIMES DAILY
COMMUNITY
Start: 2024-12-09

## 2025-01-28 RX ORDER — MUPIROCIN 20 MG/G
OINTMENT TOPICAL
COMMUNITY
Start: 2024-10-01

## 2025-01-28 RX ORDER — FERROUS GLUCONATE 324(38)MG
324 TABLET ORAL EVERY OTHER DAY
Qty: 45 TABLET | Refills: 3 | Status: SHIPPED | OUTPATIENT
Start: 2025-01-28

## 2025-01-28 NOTE — ASSESSMENT & PLAN NOTE
Pt would like further evaluation for staging/ testing. Referral to GI. US in a year ordered for surveillance  Low fat/ chol diet. Limit Tylenol 2 g/d. Avoid alcohol. Regular exercise as tolerated. Control of co-morbidities such as HTN, HLD, DM if applicable.

## 2025-01-28 NOTE — ASSESSMENT & PLAN NOTE
BMI 36.21, wt gain 11# since last visit 10/2024  Educated on increased risk of disease s/t obesity.  Educated on health benefits of at least 5 days/ week of 30 minutes moderate intensity exercise (brisk walking) and 2 or more days/ week of muscle strength activities (as tolerated).  Eat well balanced diet of fresh fruits/ vegetables, whole grains, lean meats and limit high carbohydrate foods.

## 2025-01-28 NOTE — ASSESSMENT & PLAN NOTE
Asymptomatic. Last H/H 10/2024 stable  Reports tongue irritation with OTC ferrous gluconate. Rx sent. If continues, notify provider

## 2025-01-28 NOTE — PROGRESS NOTES
L Walter, NP   [facility]  [address] [facility] [address]      PATIENT NAME: Beverly Lee  : 1975  DATE: 25  MRN: [id number]        History of Present Illness / Problem Focused Workflow     Beverly Lee presents to the clinic with Follow-up, Weight Gain, and abdominal swelling      Pt here for c/o weight gain and abdominal swelling. Noticed swelling to epigastrium a few weeks after surgery. She has had issues with abdominal swelling/ fluid collection with previous panniculectomy.     Of note, she underwent b/l breast reduction 24 with Dr. Lal. Was seen in [facility] [place] on 1/10/25 for same complaints as above.    Recently seen by bariatrics 25 and documentation reviewed with report noted that she has not been exercising since last surgery while caring for her ill mother.   She underwent CT abdomen/ pelvis 1/15/25 with results as below.   DISCUSSION:   LOWER THORAX: Lung bases clear. No pericardial or pleural effusions. Small hiatal hernia.     HEPATOBILIARY: The liver is enlarged, measures 18 cm in the right midclavicular line. Diffuse hepatic low attenuation suggests steatosis. Geographic hypodensity at the falciform ligament, likely focal fatty infiltration. Otherwise no focal hepatic lesions. Mild biliary ductal prominence, likely reservoir effect status post cholecystectomy.   SPLEEN: Size within normal limits. Cystic 1.2 cm lesion, not significantly changed.     PANCREAS: No focal masses or ductal dilatation.   ADRENALS: No nodules.   KIDNEYS/URETERS: No hydronephrosis, stones, or solid mass lesions.     PELVIC ORGANS/BLADDER: Right ovarian follicles.   PERITONEUM / RETROPERITONEUM: No free intraperitoneal air. Trace free fluid in the pelvis is presumed physiologic.     LYMPH NODES: No lymphadenopathy.   VESSELS: Unremarkable.     GI TRACT: Status post gastric sleeve. No distention or wall thickening. Normal  appendix.     BONES AND SOFT TISSUES: Chest and abdominal wall evolving postsurgical changes. Healed lower anterior abdominal wall surgical scar. Otherwise soft tissues within normal limits. No bony destructive lesions. No acute bony pathology.     Today's weight 198#. [place] in [facility] [place] and wt 189#. Continues to have tongue irritation to OTC iron pills.    Review of Systems     Review of Systems   Constitutional: Negative.    HENT: Negative.     Eyes: Negative.    Respiratory: Negative.     Cardiovascular: Negative.    Gastrointestinal: Negative.    Endocrine: Negative.    Genitourinary: Negative.    Musculoskeletal: Negative.    Skin: Negative.    Allergic/Immunologic: Negative.    Neurological: Negative.    Hematological: Negative.    Psychiatric/Behavioral: Negative.         Medical / Social / Family History     -------------------------------------    Abnormal Pap smear of cervix    Acute cystitis with hematuria    ADHD    Anxiety and depression    Arthritis    BMI 36.0-36.9,adult    Bowel incontinence    Breast pain, left    Carpal tunnel syndrome, right    Gastritis    Hypertension    Respiratory distress  with anesthesia    Seasonal allergies    Vaginal fistula        Past Surgical History:   Procedure Laterality Date    carpal tunnel released Right      SECTION      CHOLECYSTECTOMY      COLONOSCOPY, WITH POLYPECTOMY USING HOT BIOPSY FORCEPS N/A 2024    Procedure: COLONOSCOPY, WITH POLYPECTOMY USING HOT BIOPSY FORCEPS;  Surgeon: SUKUMAR Quinones MD;  Location: [facility] ENDOSCOPY;  Service: Gastroenterology;  Laterality: N/A;  Cold forceps    COLONOSCOPY, WITH POLYPECTOMY USING SNARE N/A 2024    Procedure: COLONOSCOPY, WITH POLYPECTOMY USING SNARE;  Surgeon: SUKUMAR Quinones MD;  Location: [facility] ENDOSCOPY;  Service: Gastroenterology;  Laterality: N/A;    gastric sleeve  2022    PENECTOMY, TOTAL, RADICAL, WITH LYMPHADENECTOMY      RECTAL SURGERY      REDUCTION OF BOTH BREASTS  Bilateral 12/12/2024    Procedure: MAMMOPLASTY, REDUCTION, BILATERAL (bilateral breast reduction with free nipple grafts);  Surgeon: Jhon Lal MD;  Location: [facility];  Service: Plastics;  Laterality: Bilateral;  468 grams removed from right breast, 412 grams removed from left breast    REPAIR OF ACHILLES TENDON Right     STIMULATOR PLACEMENT AND REMOVAL      STATES TO STIMULATE BOWELS    TONSILLECTOMY      VAGINA SURGERY      vaginal fistula    WISDOM TOOTH EXTRACTION         Social History     Socioeconomic History    Marital status: Single    Number of children: 3   Tobacco Use    Smoking status: Never    Smokeless tobacco: Never   Substance and Sexual Activity    Alcohol use: Not Currently    Drug use: Never    Sexual activity: Yes     Partners: Male   Social History Narrative    ** Merged History Encounter **          Social Drivers of Health     Financial Resource Strain: Low Risk  (9/4/2024)    Overall Financial Resource Strain (CARDIA)     Difficulty of Paying Living Expenses: Not hard at all   Food Insecurity: No Food Insecurity (9/4/2024)    Hunger Vital Sign     Worried About Running Out of Food in the Last Year: Never true     Ran Out of Food in the Last Year: Never true   Transportation Needs: No Transportation Needs (8/7/2024)    TRANSPORTATION NEEDS     Transportation : No   Physical Activity: Sufficiently Active (8/7/2024)    Exercise Vital Sign     Days of Exercise per Week: 3 days     Minutes of Exercise per Session: 50 min   Stress: Stress Concern Present (8/7/2024)    [employer] - Occupational Stress Questionnaire     Feeling of Stress : Very much   Housing Stability: Low Risk  (9/4/2024)    Housing Stability Vital Sign     Unable to Pay for Housing in the Last Year: No     Homeless in the Last Year: No        Family History   Problem Relation Name Age of Onset    COPD Mother Naomy rafael     Arthritis Mother Naomy rafael     Lung cancer Mother Naomy rafael      Hypertension Mother Naomy hall     Hyperlipidemia Father Naomy hall     Rheum arthritis Father Naomy hall     Colon cancer Father Naomy hall     Rheum arthritis Sister      ALS Paternal Uncle      Parkinsonism Paternal Uncle          Medications and Allergies     Medications  Current Outpatient Medications   Medication Instructions    aluminum & magnesium hydroxide-simethicone (MYLANTA MAX STRENGTH) 400-400-40 mg/5 mL suspension 5 mLs, Every 6 hours PRN    calcium-vitamin D3 (OS-FIGUEROA 500 + D3) 500 mg-5 mcg (200 unit) per tablet 1 tablet    cephALEXin (KEFLEX) 500 mg, 4 times daily    cetirizine (ZYRTEC) 10 mg, Daily PRN    cholestyramine (QUESTRAN) 4 gram packet 4 g, Oral, 3 times daily with meals    clindamycin (CLEOCIN T) 1 % Swab SMARTSI Topical Every Morning    clotrimazole (LOTRIMIN) 1 % cream Topical (Top), 2 times daily    dextroamphetamine-amphetamine (ADDERALL XR) 30 MG 24 hr capsule 30 mg, Every morning    diclofenac sodium (VOLTAREN) 1 % Gel APPLY TOPICALLY TO THE AFFECTED AREA FOUR TIMES DAILY AS NEEDED FOR PAIN    ferrous gluconate (FERGON) 324 mg, Oral, Every other day    finasteride (PROSCAR) 5 mg, Daily    fluticasone propionate (FLONASE) 100 mcg, Each Nostril, 2 times daily, prn    ibuprofen (ADVIL,MOTRIN) 800 mg, Oral, 3 times daily    LIDOcaine-diphenhyd-Al-mag-sim (FIRST-MOUTHWASH BLM) -480-40 mg/30mL mouthwash suspension 10 mLs, Swish & Spit, 3 times daily    LINZESS 145 mcg, Daily    minoxidiL (LONITEN) 1.25 mg, Daily    mupirocin (BACTROBAN) 2 % ointment APPLY TOPICALLY TO THE AFFECTED AREA TWICE DAILY FOR 7 DAYS    MV-MN-IRON FUM-FA-OMEGA3,6,9#3 ORAL 1 tablet, Daily    nystatin-triamcinolone (MYCOLOG II) cream 2 times daily PRN    omeprazole (PRILOSEC) 20 mg, Daily    ondansetron (ZOFRAN-ODT) 4 mg, Every 8 hours PRN    PaxiL 30 mg, Every morning    sucralfate (CARAFATE) 1 g, 4 times daily    traZODone (DESYREL)  mg, Nightly       Allergies  Review of patient's allergies  "indicates:   Allergen Reactions    Macrolide antibiotics Palpitations    Nitrofurantoin Palpitations       Physical Examination     Visit Vitals  /78   Pulse 83   Temp 98.4 °F (36.9 °C) (Oral)   Resp 20   Ht 5' 2" (1.575 m)   Wt 89.8 kg (198 lb)   LMP 01/20/2025 (Exact Date)   SpO2 100%   BMI 36.21 kg/m²       Physical Exam  Constitutional:       General: She is not in acute distress.     Appearance: Normal appearance. She is obese. She is not ill-appearing or diaphoretic.   HENT:      Head: Normocephalic.   Cardiovascular:      Rate and Rhythm: Normal rate.   Pulmonary:      Effort: Pulmonary effort is normal. No respiratory distress.   Abdominal:      General: There is no distension.      Palpations: Abdomen is soft. There is no mass.      Tenderness: There is no abdominal tenderness.   Skin:     General: Skin is warm and dry.   Neurological:      Mental Status: She is alert and oriented to person, place, and time. Mental status is at baseline.      Coordination: Coordination normal.      Gait: Gait normal.   Psychiatric:         Mood and Affect: Mood normal.         Behavior: Behavior normal.         Thought Content: Thought content normal.         Judgment: Judgment normal.           Results     Lab Results   Component Value Date    WBC 6.31 10/22/2024    RBC 3.69 (L) 10/22/2024    HGB 11.3 (L) 10/22/2024    HCT 34.7 (L) 10/22/2024    MCV 94.0 10/22/2024    MCH 30.6 10/22/2024    MCHC 32.6 (L) 10/22/2024    RDW 12.0 10/22/2024     10/22/2024    MPV 10.7 (H) 10/22/2024    EOS 0.4 06/10/2024    BASO 0.0 06/10/2024    EOSINOPHIL 4 06/10/2024     Assessment       ICD-10-CM ICD-9-CM   1. Abdominal swelling  R19.00 789.30   2. Hepatic steatosis  K76.0 571.8   3. Splenic cyst  D73.4 289.59   4. Iron deficiency anemia, unspecified iron deficiency anemia type  D50.9 280.9   5. S/P bariatric surgery  Z98.84 V45.86   6. Deficiency of other specified B group vitamins  E53.8 266.2   7. BMI 35.0-35.9,adult  " Z68.35 V85.35       Plan       Problem List Items Addressed This Visit          Oncology    Iron deficiency anemia    Current Assessment & Plan     Asymptomatic. Last H/H 10/2024 stable  Reports tongue irritation with OTC ferrous gluconate. Rx sent. If continues, notify provider          Relevant Medications    ferrous gluconate (FERGON) 324 MG tablet    Other Relevant Orders    Ferritin    Iron and TIBC    Splenic cyst    Overview     Refer to January 15, 2025 CT abdomen with 1.2 cm splenic cyst         Current Assessment & Plan     Referral to GI. US in a year for surveillance.          Relevant Orders    Ambulatory referral/consult to Gastroenterology    US Abdomen Complete       Endocrine    BMI 35.0-35.9,adult    Current Assessment & Plan     BMI 36.21, wt gain 11# since last visit 10/2024  Educated on increased risk of disease s/t obesity.  Educated on health benefits of at least 5 days/ week of 30 minutes moderate intensity exercise (brisk walking) and 2 or more days/ week of muscle strength activities (as tolerated).  Eat well balanced diet of fresh fruits/ vegetables, whole grains, lean meats and limit high carbohydrate foods.            S/P bariatric surgery    Overview     Formatting of this note might be different from the original.  S/p sleeve gastrectomy with hiatal hernia repair on 5/26/22    Last Assessment & Plan:   Formatting of this note might be different from the original.  2 weeks post-op  Initial Wt: 282 lb  Pre-op Wt: 258  Todays Wt: 241    Net since surgery: -17 lbs  Net since joining program: -41 lbs    Plan:  - continue post-gastrectomy diet  - continue vitamins  - increase physical activity         Relevant Orders    Vitamin B12    Ferritin    Iron and TIBC    Vitamin D       GI    Hepatic steatosis    Overview     Refer to CT abdomen 1/15/25         Current Assessment & Plan     Pt would like further evaluation for staging/ testing. Referral to GI. US in a year ordered for  surveillance  Low fat/ chol diet. Limit Tylenol 2 g/d. Avoid alcohol. Regular exercise as tolerated. Control of co-morbidities such as HTN, HLD, DM if applicable.          Relevant Orders    Ambulatory referral/consult to Gastroenterology    US Abdomen Complete     Other Visit Diagnoses       Abdominal swelling    -  Primary  Negative CT and XR abd for abnormalities to explain fluid collection. Pt did not have muscle repair with previous surgery and seems there is lack of abdominal muscle wall in epigastrium with lkely fluid collection after surgery. Pt has not been cleared for exercise and plans to start back as soon as she can. Encourage to continue to monitor and if any worsening or continued symptoms, notify provider.      Deficiency of other specified B group vitamins        Relevant Orders    Vitamin B12            Future Appointments   Date Time Provider Department Center   2/12/2025  8:30 AM Elayne Gaytan PA-C [facility]   3/13/2025 10:30 AM Melanie Vigil FNP [facility] ENT [place]   4/1/2025  9:45 AM Phil Collins MD [facility] [place]   6/10/2025  8:30 AM Denia Quiles, DEMI [facility] GYN [place]   8/20/2025  9:00 AM Lauren Watson NP [facility] INTM[facility] [place]        Follow up if symptoms worsen or fail to improve.    Signature:  Lauren Watson NP  [facility]  [address] [address]    Date of encounter: 1/28/25

## 2025-02-12 ENCOUNTER — OFFICE VISIT (OUTPATIENT)
Dept: ORTHOPEDICS | Facility: CLINIC | Age: 50
End: 2025-02-12
Payer: MEDICARE

## 2025-02-12 VITALS
WEIGHT: 198 LBS | SYSTOLIC BLOOD PRESSURE: 145 MMHG | HEART RATE: 78 BPM | BODY MASS INDEX: 36.44 KG/M2 | HEIGHT: 62 IN | DIASTOLIC BLOOD PRESSURE: 85 MMHG

## 2025-02-12 DIAGNOSIS — M65.4 DE QUERVAIN'S TENOSYNOVITIS, LEFT: Primary | ICD-10-CM

## 2025-02-12 PROCEDURE — 99213 OFFICE O/P EST LOW 20 MIN: CPT | Mod: ,,,

## 2025-02-12 PROCEDURE — 1160F RVW MEDS BY RX/DR IN RCRD: CPT | Mod: CPTII,,,

## 2025-02-12 PROCEDURE — 3079F DIAST BP 80-89 MM HG: CPT | Mod: CPTII,,,

## 2025-02-12 PROCEDURE — 3008F BODY MASS INDEX DOCD: CPT | Mod: CPTII,,,

## 2025-02-12 PROCEDURE — 3077F SYST BP >= 140 MM HG: CPT | Mod: CPTII,,,

## 2025-02-12 PROCEDURE — 1159F MED LIST DOCD IN RCRD: CPT | Mod: CPTII,,,

## 2025-02-12 NOTE — PROGRESS NOTES
[facility] - [facility]    Chief Complaint:  Left wrist pain      History of Present Illness: Beverly Lee is a 49 y.o. female here today for left wrist pain.  Patient states that her wrist has been giving her trouble for the last couple of weeks.  She says that she experiences more pain when bending the wrist.  She denies numbness or tingling.  No prior injuries. Her last DeQuervain's injection was on 2024. She states that it provided her relief for about a month. She would like to schedule surgery.       Past Medical History:   Diagnosis Date    Abnormal Pap smear of cervix     Acute cystitis with hematuria 2023    ADHD     Anxiety and depression     Arthritis     BMI 36.0-36.9,adult 2023    Bowel incontinence     Breast pain, left 2024    Carpal tunnel syndrome, right     Gastritis     Hypertension     Respiratory distress  with anesthesia     Seasonal allergies     Vaginal fistula         Past Surgical History:   Procedure Laterality Date    carpal tunnel released Right      SECTION      CHOLECYSTECTOMY      COLONOSCOPY, WITH POLYPECTOMY USING HOT BIOPSY FORCEPS N/A 2024    Procedure: COLONOSCOPY, WITH POLYPECTOMY USING HOT BIOPSY FORCEPS;  Surgeon: SUKUMAR Quinones MD;  Location: [facility] ENDOSCOPY;  Service: Gastroenterology;  Laterality: N/A;  Cold forceps    COLONOSCOPY, WITH POLYPECTOMY USING SNARE N/A 2024    Procedure: COLONOSCOPY, WITH POLYPECTOMY USING SNARE;  Surgeon: SUKUMAR Quinones MD;  Location: [facility] ENDOSCOPY;  Service: Gastroenterology;  Laterality: N/A;    gastric sleeve  2022    PENECTOMY, TOTAL, RADICAL, WITH LYMPHADENECTOMY      RECTAL SURGERY      REDUCTION OF BOTH BREASTS Bilateral 2024    Procedure: MAMMOPLASTY, REDUCTION, BILATERAL (bilateral breast reduction with free nipple grafts);  Surgeon: Jhon Lal MD;  Location: [facility] OR;  Service: Plastics;  Laterality: Bilateral;  468 grams removed from right breast, 412 grams removed  from left breast    REPAIR OF ACHILLES TENDON Right     STIMULATOR PLACEMENT AND REMOVAL      STATES TO STIMULATE BOWELS    TONSILLECTOMY      VAGINA SURGERY      vaginal fistula    WISDOM TOOTH EXTRACTION          Social History     Tobacco Use    Smoking status: Never    Smokeless tobacco: Never   Substance and Sexual Activity    Alcohol use: Not Currently    Drug use: Never    Sexual activity: Yes     Partners: Male        Current Outpatient Medications   Medication Instructions    aluminum & magnesium hydroxide-simethicone (MYLANTA MAX STRENGTH) 400-400-40 mg/5 mL suspension 5 mLs, Every 6 hours PRN    calcium-vitamin D3 (OS-FIGUEROA 500 + D3) 500 mg-5 mcg (200 unit) per tablet 1 tablet    cephALEXin (KEFLEX) 500 mg, 4 times daily    cetirizine (ZYRTEC) 10 mg, Daily PRN    cholestyramine (QUESTRAN) 4 gram packet 4 g, Oral, 3 times daily with meals    clindamycin (CLEOCIN T) 1 % Swab SMARTSI Topical Every Morning    clotrimazole (LOTRIMIN) 1 % cream Topical (Top), 2 times daily    dextroamphetamine-amphetamine (ADDERALL XR) 30 MG 24 hr capsule 30 mg, Every morning    diclofenac sodium (VOLTAREN) 1 % Gel APPLY TOPICALLY TO THE AFFECTED AREA FOUR TIMES DAILY AS NEEDED FOR PAIN    ferrous gluconate (FERGON) 324 mg, Oral, Every other day    finasteride (PROSCAR) 5 mg, Daily    fluticasone propionate (FLONASE) 100 mcg, Each Nostril, 2 times daily, prn    ibuprofen (ADVIL,MOTRIN) 800 mg, Oral, 3 times daily    LIDOcaine-diphenhyd-Al-mag-sim (FIRST-MOUTHWASH BLM) -285-40 mg/30mL mouthwash suspension 10 mLs, Swish & Spit, 3 times daily    LINZESS 145 mcg, Daily    minoxidiL (LONITEN) 1.25 mg, Daily    mupirocin (BACTROBAN) 2 % ointment APPLY TOPICALLY TO THE AFFECTED AREA TWICE DAILY FOR 7 DAYS    MV-MN-IRON FUM-FA-OMEGA3,6,9#3 ORAL 1 tablet, Daily    nystatin-triamcinolone (MYCOLOG II) cream 2 times daily PRN    omeprazole (PRILOSEC) 20 mg, Daily    ondansetron (ZOFRAN-ODT) 4 mg, Every 8 hours PRN    PaxiL 30 mg,  "Every morning    sucralfate (CARAFATE) 1 g, 4 times daily    traZODone (DESYREL)  mg, Nightly       Review of patient's allergies indicates:   Allergen Reactions    Macrolide antibiotics Palpitations    Nitrofurantoin Palpitations        Patient Care Team:  Lauren Watson, NP as PCP - General (Nurse Practitioner)     Review of Systems:    Constitution:   Denies chills, fever, and sweats.  HENT:   Denies headaches or blurry vision.  Cardiovascular:   Denies chest pain or irregular heart beat.  Respiratory:   Denies cough or shortness of breath.  Gastrointestinal:  Denies abdominal pain, nausea, or vomiting.  Musculoskeletal:   Denies muscle cramps.  Neurological:   Denies dizziness or focal weakness.  Psychiatric/Behavior: Normal mental status.  Hematology/Lymph:  Denies bleeding problem or easy bruising/bleeding.  Skin:    Denies rash or suspicious lesions.    Physical Examination:    Vital Signs:    Vitals:    02/12/25 0914   BP: (!) 145/85   Pulse: 78   Weight: 89.8 kg (197 lb 15.6 oz)   Height: 5' 2" (1.575 m)   Body mass index is 36.21 kg/m².    Constitution:   Well-developed, well nourished patient in no acute distress.  Neurological:   Alert and oriented x 3 and cooperative to examination.     Psychiatric/Behavior: Normal mental status.  Respiratory:   No shortness of breath. Non-labored breathing.  Eyes:    Extraoccular muscles intact.    Musculoskeletal Examination:     Left Upper Extremity:  [x] No open wounds or rashes.    [] Abnormal skin findings:.  [x] Full ROM of the wrist, hand and digits.    [] Limited ROM of the wrist, hand, and digits:   [x] Radial pulses 2+ is warm well perfused.      Positive Finkelstein's test  Tenderness to palpation of radial aspect of wrist  Mild swelling noted radial aspect of wrist       Imaging:   X-rays of the left wrist three views show no abnormal findings     Assessment:  Left de Quervain's tenosynovitis    Plan:  Patient is still experiencing pain despite " injections. Discussed surgical options and patient would like to schedule surgery sometime in April. She will follow up in a month for a pre-op appointment.     Follow Up:  1 month    X-Ray at Next Visit:  None

## 2025-02-13 ENCOUNTER — OFFICE VISIT (OUTPATIENT)
Dept: URGENT CARE | Facility: CLINIC | Age: 50
End: 2025-02-13
Payer: MEDICARE

## 2025-02-13 VITALS
SYSTOLIC BLOOD PRESSURE: 145 MMHG | OXYGEN SATURATION: 99 % | BODY MASS INDEX: 36.71 KG/M2 | HEART RATE: 74 BPM | HEIGHT: 62 IN | TEMPERATURE: 98 F | RESPIRATION RATE: 18 BRPM | WEIGHT: 199.5 LBS | DIASTOLIC BLOOD PRESSURE: 87 MMHG

## 2025-02-13 DIAGNOSIS — N30.01 ACUTE CYSTITIS WITH HEMATURIA: Primary | ICD-10-CM

## 2025-02-13 DIAGNOSIS — R30.0 DYSURIA: ICD-10-CM

## 2025-02-13 LAB
BILIRUB UR QL STRIP: NEGATIVE
GLUCOSE UR QL STRIP: NEGATIVE
KETONES UR QL STRIP: NEGATIVE
LEUKOCYTE ESTERASE UR QL STRIP: POSITIVE
PH, POC UA: 5.5
POC BLOOD, URINE: POSITIVE
POC NITRATES, URINE: POSITIVE
PROT UR QL STRIP: NEGATIVE
SP GR UR STRIP: 1.02 (ref 1–1.03)
UROBILINOGEN UR STRIP-ACNC: ABNORMAL (ref 0.1–1.1)

## 2025-02-13 PROCEDURE — 87077 CULTURE AEROBIC IDENTIFY: CPT

## 2025-02-13 PROCEDURE — 99213 OFFICE O/P EST LOW 20 MIN: CPT | Mod: S$PBB,,,

## 2025-02-13 PROCEDURE — 81003 URINALYSIS AUTO W/O SCOPE: CPT | Mod: PBBFAC

## 2025-02-13 PROCEDURE — 99215 OFFICE O/P EST HI 40 MIN: CPT | Mod: PBBFAC

## 2025-02-13 RX ORDER — PHENAZOPYRIDINE HYDROCHLORIDE 200 MG/1
200 TABLET, FILM COATED ORAL 3 TIMES DAILY PRN
Qty: 30 TABLET | Refills: 0 | Status: SHIPPED | OUTPATIENT
Start: 2025-02-13 | End: 2025-02-23

## 2025-02-13 RX ORDER — CIPROFLOXACIN 500 MG/1
500 TABLET ORAL EVERY 12 HOURS
Qty: 14 TABLET | Refills: 0 | Status: SHIPPED | OUTPATIENT
Start: 2025-02-13 | End: 2025-02-20

## 2025-02-13 NOTE — PROGRESS NOTES
"Subjective:       Patient ID: Beverly Lee is a 49 y.o. female.    Vitals:  height is 5' 2" (1.575 m) and weight is 90.5 kg (199 lb 8.3 oz). Her temperature is 98.3 °F (36.8 °C). Her blood pressure is 145/87 (abnormal) and her pulse is 74. Her respiration is 18 and oxygen saturation is 99%.     Chief Complaint: Dysuria (X 2days)    49-year-old female reports to the clinic with complaints of dysuria that began 2 days ago, urinary frequency, and urinary pressure.  Patient states she does have incontinence and feels that and incontinence episode while she was out grocery shopping contributed to this UTI.  Patient denies fever, nausea, vomiting, abdominal pain, back pain, headache, chills, fatigue, body aches.  Patient has been taking azo for the past 3 days with no relief.    All other systems are negative    Chart reviewed    Objective:   Physical Exam   Constitutional: She appears well-developed.  Non-toxic appearance. She does not appear ill. No distress.   Neck: Neck supple.   Cardiovascular: Regular rhythm.   Pulmonary/Chest: Effort normal and breath sounds normal.   Abdominal: Normal appearance. She exhibits no distension. Soft. flat abdomen There is no abdominal tenderness. There is no rebound, no guarding, no left CVA tenderness and no right CVA tenderness.   Musculoskeletal: Normal range of motion.         General: Normal range of motion.   Neurological: no focal deficit. She is alert.   Skin: Skin is warm, dry and not diaphoretic.   Psychiatric: Her behavior is normal. Mood normal.   Nursing note and vitals reviewed.      Assessment:     1. Acute cystitis with hematuria    2. Dysuria          Results for orders placed or performed in visit on 02/13/25   POCT Urinalysis, Dipstick, Automated, W/O Scope    Collection Time: 02/13/25  9:32 AM   Result Value Ref Range    POC Blood, Urine Positive (A) Negative    POC Bilirubin, Urine Negative Negative    POC Urobilinogen, Urine 0.2E.U./dl 0.1 - 1.1    POC " Ketones, Urine Negative Negative    POC Protein, Urine Negative Negative    POC Nitrates, Urine Positive (A) Negative    POC Glucose, Urine Negative Negative    pH, UA 5.5     POC Specific Gravity, Urine 1.020 1.003 - 1.029    POC Leukocytes, Urine Positive (A) Negative       Plan:     Complete full course of antibiotics.      Drink plenty of water daily. Avoid soda.    Follow up with your primary care doctor as needed.    Present to the nearest Emergency Department with any significant change or worsening of symptoms including but not limited to blood in urine, nausea/vomiting, abdominal pain, fever, body aches, chills, or flank pain.      Acute cystitis with hematuria  -     Urine Culture High Risk  -     ciprofloxacin HCl (CIPRO) 500 MG tablet; Take 1 tablet (500 mg total) by mouth every 12 (twelve) hours. for 7 days  Dispense: 14 tablet; Refill: 0  -     phenazopyridine (PYRIDIUM) 200 MG tablet; Take 1 tablet (200 mg total) by mouth 3 (three) times daily as needed for Pain.  Dispense: 30 tablet; Refill: 0    Dysuria  -     POCT Urinalysis, Dipstick, Automated, W/O Scope        Please note: This chart was completed via voice to text dictation. It may contain typographical/word recognition errors. If there are any questions, please contact the provider for final clarification.

## 2025-02-15 ENCOUNTER — RESULTS FOLLOW-UP (OUTPATIENT)
Dept: URGENT CARE | Facility: CLINIC | Age: 50
End: 2025-02-15
Payer: MEDICARE

## 2025-02-15 LAB — BACTERIA UR CULT: ABNORMAL

## 2025-02-24 DIAGNOSIS — M17.11 PRIMARY OSTEOARTHRITIS OF RIGHT KNEE: ICD-10-CM

## 2025-02-24 RX ORDER — IBUPROFEN 800 MG/1
800 TABLET ORAL 3 TIMES DAILY
Qty: 21 TABLET | Refills: 0 | OUTPATIENT
Start: 2025-02-24

## 2025-02-24 NOTE — TELEPHONE ENCOUNTER
Elayne prescribed this for her wrist pain.  I would like her to check with her bariatric surgeon regarding if she is okay with her continuing this before I refill it.  She underwent bariatric surgery a few years ago.

## 2025-04-01 ENCOUNTER — OFFICE VISIT (OUTPATIENT)
Dept: ORTHOPEDICS | Facility: CLINIC | Age: 50
End: 2025-04-01
Payer: MEDICARE

## 2025-04-01 VITALS
HEIGHT: 62 IN | DIASTOLIC BLOOD PRESSURE: 87 MMHG | HEART RATE: 78 BPM | BODY MASS INDEX: 36.07 KG/M2 | WEIGHT: 196 LBS | SYSTOLIC BLOOD PRESSURE: 136 MMHG

## 2025-04-01 DIAGNOSIS — M17.11 PRIMARY OSTEOARTHRITIS OF RIGHT KNEE: Primary | ICD-10-CM

## 2025-04-01 PROCEDURE — 3075F SYST BP GE 130 - 139MM HG: CPT | Mod: CPTII,,, | Performed by: ORTHOPAEDIC SURGERY

## 2025-04-01 PROCEDURE — 3008F BODY MASS INDEX DOCD: CPT | Mod: CPTII,,, | Performed by: ORTHOPAEDIC SURGERY

## 2025-04-01 PROCEDURE — 3079F DIAST BP 80-89 MM HG: CPT | Mod: CPTII,,, | Performed by: ORTHOPAEDIC SURGERY

## 2025-04-01 PROCEDURE — 1159F MED LIST DOCD IN RCRD: CPT | Mod: CPTII,,, | Performed by: ORTHOPAEDIC SURGERY

## 2025-04-01 PROCEDURE — 99214 OFFICE O/P EST MOD 30 MIN: CPT | Mod: ,,, | Performed by: ORTHOPAEDIC SURGERY

## 2025-04-01 RX ORDER — IBUPROFEN 800 MG/1
800 TABLET ORAL 3 TIMES DAILY
Qty: 21 TABLET | Refills: 0 | Status: SHIPPED | OUTPATIENT
Start: 2025-04-01

## 2025-04-01 NOTE — PROGRESS NOTES
Subjective:      Patient ID: Beverly Lee is a 49 y.o. female.    Chief Complaint: Follow-up of the Right Knee (3mon f/u right Knee Synvisc on 12/26/24 patient states it helped and still working. Patient states she would need a refill of Ibuprofen 800mg please and thank you)    HPI:     History of Present Illness    CHIEF COMPLAINT:  - Follow-up after gel injection    HPI:  Beverly presents for follow-up three months after receiving a gel injection. She reports significant improvement since the injection. She takes ibuprofen as needed for maintenance, particularly if she experiences a flare-up after extensive workouts. She states that 3-4 months after the injection, she might experience mild discomfort if she exercises intensively, at which point she takes ibuprofen. This may occur every two to four weeks. She had bariatric surgery three years ago and has been taking ibuprofen without complications since then. She mentions an upcoming hand surgery, for which another practitioner considered prescribing ibuprofen. Her 16-year-old son is experiencing football-related issues and desires an evaluation from this practitioner.    Beverly denies taking ibuprofen daily. She denies any current issues related to bariatric surgery.    PREVIOUS TREATMENTS:  - Gel injection: 3 months ago, provided significant benefit    MEDICATIONS:  - Ibuprofen: 800 mg as needed for occasional flare-ups, typically 3-4 months after receiving an injection if patient exercises intensively    SURGICAL HISTORY:  - Bariatric surgery: 3 years ago      ROS:  Musculoskeletal: +joint pain, +limb pain, +pain with movement          Past Medical History:   Diagnosis Date    Abnormal Pap smear of cervix     Acute cystitis with hematuria 07/26/2023    ADHD     Anxiety and depression     Arthritis     BMI 36.0-36.9,adult 07/26/2023    Bowel incontinence     Breast pain, left 04/22/2024    Carpal tunnel syndrome, right     Gastritis     Hypertension      "Respiratory distress  with anesthesia     Seasonal allergies     Vaginal fistula      Past Surgical History:   Procedure Laterality Date    carpal tunnel released Right      SECTION      CHOLECYSTECTOMY      COLONOSCOPY, WITH POLYPECTOMY USING HOT BIOPSY FORCEPS N/A 2024    Procedure: COLONOSCOPY, WITH POLYPECTOMY USING HOT BIOPSY FORCEPS;  Surgeon: SUKUMAR Quinones MD;  Location: [facility] ENDOSCOPY;  Service: Gastroenterology;  Laterality: N/A;  Cold forceps    COLONOSCOPY, WITH POLYPECTOMY USING SNARE N/A 2024    Procedure: COLONOSCOPY, WITH POLYPECTOMY USING SNARE;  Surgeon: SUKUMAR Quinones MD;  Location: [facility] ENDOSCOPY;  Service: Gastroenterology;  Laterality: N/A;    gastric sleeve  2022    PENECTOMY, TOTAL, RADICAL, WITH LYMPHADENECTOMY      RECTAL SURGERY      REDUCTION OF BOTH BREASTS Bilateral 2024    Procedure: MAMMOPLASTY, REDUCTION, BILATERAL (bilateral breast reduction with free nipple grafts);  Surgeon: Jhon Lal MD;  Location: [facility] OR;  Service: Plastics;  Laterality: Bilateral;  468 grams removed from right breast, 412 grams removed from left breast    REPAIR OF ACHILLES TENDON Right     STIMULATOR PLACEMENT AND REMOVAL      STATES TO STIMULATE BOWELS    TONSILLECTOMY      VAGINA SURGERY      vaginal fistula    WISDOM TOOTH EXTRACTION       Social History[1]    Current Medications[2]  Review of patient's allergies indicates:   Allergen Reactions    Macrolide antibiotics Palpitations    Nitrofurantoin Palpitations       /87 (BP Location: Left arm, Patient Position: Sitting)   Pulse 78   Ht 5' 2" (1.575 m)   Wt 88.9 kg (196 lb)   BMI 35.85 kg/m²     Comprehensive review of systems completed and negative except as per HPI.        Objective:   General: Well-developed, well-nourished.  Neuro: Alert and oriented x 3.  Psych: Normal mood and affect.  Card: Regular rate and rhythm  Resp: Respirations regular and unlabored    Knee Exam:    No obvious deformity. " Range of motion from 0-130 degrees. Negative patella grind and equal subluxation of knee cap medial and lateral < 1cm. Negative patella tendon tenderness. Negative Lachman and anterior drawer test. Negative posterior drawer test. Negative varus and valgus stress test. Negative medial joint line tenderness. Negative lateral joint line tenderness. 5/5 strength and normal skin appearance. Sensibility normal.      Assessment:         1. Primary osteoarthritis of right knee          Plan:       Orders Placed This Encounter    ibuprofen (ADVIL,MOTRIN) 800 MG tablet        Imaging and exam findings discussed.     Assessment & Plan    PROCEDURES:  - Discussed steroid injection as a potential treatment option.  - Beverly declined injection at this time.    MEDICATIONS:  - Ibuprofen 800mg, 30 tablets, as needed.    FOLLOW UP:  - Contact the office if pain returns for potential injection.               All questions were answered. Patient happy and in agreement with the plan.     This note was generated with the assistance of ambient listening technology. Verbal consent was obtained by the patient and accompanying visitor(s) for the recording of patient appointment to facilitate this note. I attest to having reviewed and edited the generated note for accuracy, though some syntax or spelling errors may persist. Please contact the author of this note for any clarification.         [1]   Social History  Socioeconomic History    Marital status: Single    Number of children: 3   Tobacco Use    Smoking status: Never    Smokeless tobacco: Never   Substance and Sexual Activity    Alcohol use: Not Currently    Drug use: Never    Sexual activity: Yes     Partners: Male   Social History Narrative    ** Merged History Encounter **          Social Drivers of Health     Financial Resource Strain: High Risk (9/17/2024)    Received from [facility] SDOH Screening     In the past year, have you or any family members you live with  been unable to get any of the following when you really needed them? choose all that apply.: Smartphone/tablet     In the past year, have you or any family members you live with been unable to get any of the following when you really needed them? choose all that apply.: Internet   Food Insecurity: High Risk (9/24/2024)    Received from [facility] SDOH Screening     In the past 2 months, did you or others you live with eat smaller meals or skip meals because you didn't have money for food?: Yes   Transportation Needs: No Transportation Needs (8/7/2024)    TRANSPORTATION NEEDS     Transportation : No   Physical Activity: Sufficiently Active (8/7/2024)    Exercise Vital Sign     Days of Exercise per Week: 3 days     Minutes of Exercise per Session: 50 min   Stress: Stress Concern Present (8/7/2024)    [employer] - Occupational Stress Questionnaire     Feeling of Stress : Very much   Housing Stability: Unknown (9/4/2024)    Housing Stability Vital Sign     Unable to Pay for Housing in the Last Year: No     Homeless in the Last Year: No   [2]   Current Outpatient Medications:     calcium-vitamin D3 (OS-FIGUEROA 500 + D3) 500 mg-5 mcg (200 unit) per tablet, Take 1 tablet by mouth., Disp: , Rfl:     cetirizine (ZYRTEC) 10 MG tablet, Take 10 mg by mouth daily as needed., Disp: , Rfl:     cholestyramine (QUESTRAN) 4 gram packet, Take 1 packet (4 g total) by mouth 3 (three) times daily with meals., Disp: 270 packet, Rfl: 12    clotrimazole (LOTRIMIN) 1 % cream, Apply topically 2 (two) times daily., Disp: 60 g, Rfl: 1    dextroamphetamine-amphetamine (ADDERALL XR) 30 MG 24 hr capsule, Take 30 mg by mouth every morning., Disp: , Rfl:     ferrous gluconate (FERGON) 324 MG tablet, Take 1 tablet (324 mg total) by mouth every other day., Disp: 45 tablet, Rfl: 3    fluticasone propionate (FLONASE) 50 mcg/actuation nasal spray, 2 sprays (100 mcg total) by Each Nostril route 2 (two) times a day.  prn, Disp: 16 g, Rfl: 11    LINZESS 145 mcg Cap capsule, Take 145 mcg by mouth once daily., Disp: , Rfl:     MV-MN-IRON FUM-FA-OMEGA3,6,9#3 ORAL, Take 1 tablet by mouth once daily., Disp: , Rfl:     nystatin-triamcinolone (MYCOLOG II) cream, Apply topically 2 (two) times daily as needed., Disp: , Rfl:     omeprazole (PRILOSEC) 20 MG capsule, Take 20 mg by mouth once daily., Disp: , Rfl:     PAXIL 30 mg tablet, Take 30 mg by mouth every morning., Disp: , Rfl:     sucralfate (CARAFATE) 1 gram tablet, Take 1 g by mouth 4 (four) times daily., Disp: , Rfl:     traZODone (DESYREL) 50 MG tablet, Take  mg by mouth nightly., Disp: , Rfl:     aluminum & magnesium hydroxide-simethicone (MYLANTA MAX STRENGTH) 400-400-40 mg/5 mL suspension, Take 5 mLs by mouth every 6 (six) hours as needed., Disp: , Rfl:     cephALEXin (KEFLEX) 500 MG capsule, Take 500 mg by mouth 4 (four) times daily. (Patient not taking: Reported on 2025), Disp: , Rfl:     clindamycin (CLEOCIN T) 1 % Swab, SMARTSI Topical Every Morning (Patient not taking: Reported on 2025), Disp: , Rfl:     finasteride (PROSCAR) 5 mg tablet, Take 5 mg by mouth once daily., Disp: , Rfl:     ibuprofen (ADVIL,MOTRIN) 800 MG tablet, Take 1 tablet (800 mg total) by mouth 3 (three) times daily., Disp: 21 tablet, Rfl: 0    LIDOcaine-diphenhyd-Al-mag-sim (FIRST-MOUTHWASH BLM) -959-40 mg/30mL mouthwash suspension, Swish and spit 10 mLs 3 (three) times daily. (Patient not taking: Reported on 2025), Disp: 90 mL, Rfl: 0    minoxidiL (LONITEN) 2.5 MG tablet, Take 1.25 mg by mouth once daily., Disp: , Rfl:     mupirocin (BACTROBAN) 2 % ointment, APPLY TOPICALLY TO THE AFFECTED AREA TWICE DAILY FOR 7 DAYS (Patient not taking: Reported on 2025), Disp: , Rfl:     ondansetron (ZOFRAN-ODT) 4 MG TbDL, Take 4 mg by mouth every 8 (eight) hours as needed. (Patient not taking: Reported on 2025), Disp: , Rfl:

## 2025-04-16 ENCOUNTER — HOSPITAL ENCOUNTER (OUTPATIENT)
Dept: RADIOLOGY | Facility: HOSPITAL | Age: 50
Discharge: HOME | End: 2025-04-16
Payer: MEDICARE

## 2025-04-16 ENCOUNTER — ANESTHESIA EVENT (OUTPATIENT)
Dept: SURGERY | Facility: HOSPITAL | Age: 50
End: 2025-04-16
Payer: MEDICARE

## 2025-04-16 ENCOUNTER — OFFICE VISIT (OUTPATIENT)
Dept: ORTHOPEDICS | Facility: CLINIC | Age: 50
End: 2025-04-16
Payer: MEDICARE

## 2025-04-16 ENCOUNTER — CLINICAL SUPPORT (OUTPATIENT)
Dept: LAB | Facility: HOSPITAL | Age: 50
End: 2025-04-16
Payer: MEDICARE

## 2025-04-16 VITALS
BODY MASS INDEX: 35.85 KG/M2 | SYSTOLIC BLOOD PRESSURE: 138 MMHG | HEART RATE: 80 BPM | HEIGHT: 62 IN | DIASTOLIC BLOOD PRESSURE: 83 MMHG

## 2025-04-16 DIAGNOSIS — M65.4 DE QUERVAIN'S TENOSYNOVITIS, LEFT: ICD-10-CM

## 2025-04-16 DIAGNOSIS — M65.4 DE QUERVAIN'S TENOSYNOVITIS, LEFT: Primary | ICD-10-CM

## 2025-04-16 DIAGNOSIS — R79.9 ABNORMAL BLOOD CHEMISTRY: ICD-10-CM

## 2025-04-16 LAB
ALBUMIN SERPL-MCNC: 3.4 G/DL (ref 3.5–5)
ALBUMIN/GLOB SERPL: 1 RATIO (ref 1.1–2)
ALP SERPL-CCNC: 76 UNIT/L (ref 40–150)
ALT SERPL-CCNC: 10 UNIT/L (ref 0–55)
ANION GAP SERPL CALC-SCNC: 7 MEQ/L
AST SERPL-CCNC: 16 UNIT/L (ref 11–45)
BASOPHILS # BLD AUTO: 0.03 X10(3)/MCL
BASOPHILS NFR BLD AUTO: 0.4 %
BILIRUB SERPL-MCNC: 0.3 MG/DL
BUN SERPL-MCNC: 11.8 MG/DL (ref 7–18.7)
CALCIUM SERPL-MCNC: 8.9 MG/DL (ref 8.4–10.2)
CHLORIDE SERPL-SCNC: 105 MMOL/L (ref 98–107)
CO2 SERPL-SCNC: 28 MMOL/L (ref 22–29)
CREAT SERPL-MCNC: 0.82 MG/DL (ref 0.55–1.02)
CREAT/UREA NIT SERPL: 14
EOSINOPHIL # BLD AUTO: 0.29 X10(3)/MCL (ref 0–0.9)
EOSINOPHIL NFR BLD AUTO: 3.6 %
ERYTHROCYTE [DISTWIDTH] IN BLOOD BY AUTOMATED COUNT: 13.5 % (ref 11.5–17)
GFR SERPLBLD CREATININE-BSD FMLA CKD-EPI: >60 ML/MIN/1.73/M2
GLOBULIN SER-MCNC: 3.3 GM/DL (ref 2.4–3.5)
GLUCOSE SERPL-MCNC: 89 MG/DL (ref 74–100)
HCT VFR BLD AUTO: 35.8 % (ref 37–47)
HGB BLD-MCNC: 11.5 G/DL (ref 12–16)
IMM GRANULOCYTES # BLD AUTO: 0.03 X10(3)/MCL (ref 0–0.04)
IMM GRANULOCYTES NFR BLD AUTO: 0.4 %
LYMPHOCYTES # BLD AUTO: 1.62 X10(3)/MCL (ref 0.6–4.6)
LYMPHOCYTES NFR BLD AUTO: 20.4 %
MCH RBC QN AUTO: 28.6 PG (ref 27–31)
MCHC RBC AUTO-ENTMCNC: 32.1 G/DL (ref 33–36)
MCV RBC AUTO: 89.1 FL (ref 80–94)
MONOCYTES # BLD AUTO: 0.5 X10(3)/MCL (ref 0.1–1.3)
MONOCYTES NFR BLD AUTO: 6.3 %
NEUTROPHILS # BLD AUTO: 5.48 X10(3)/MCL (ref 2.1–9.2)
NEUTROPHILS NFR BLD AUTO: 68.9 %
NRBC BLD AUTO-RTO: 0 %
PLATELET # BLD AUTO: 230 X10(3)/MCL (ref 130–400)
PMV BLD AUTO: 10.5 FL (ref 7.4–10.4)
POTASSIUM SERPL-SCNC: 4.1 MMOL/L (ref 3.5–5.1)
PROT SERPL-MCNC: 6.7 GM/DL (ref 6.4–8.3)
RBC # BLD AUTO: 4.02 X10(6)/MCL (ref 4.2–5.4)
SODIUM SERPL-SCNC: 140 MMOL/L (ref 136–145)
WBC # BLD AUTO: 7.95 X10(3)/MCL (ref 4.5–11.5)

## 2025-04-16 PROCEDURE — 85025 COMPLETE CBC W/AUTO DIFF WBC: CPT

## 2025-04-16 PROCEDURE — 93010 ELECTROCARDIOGRAM REPORT: CPT | Mod: ,,, | Performed by: INTERNAL MEDICINE

## 2025-04-16 PROCEDURE — 80053 COMPREHEN METABOLIC PANEL: CPT

## 2025-04-16 PROCEDURE — 71046 X-RAY EXAM CHEST 2 VIEWS: CPT | Mod: TC

## 2025-04-16 PROCEDURE — 93005 ELECTROCARDIOGRAM TRACING: CPT

## 2025-04-16 PROCEDURE — 36415 COLL VENOUS BLD VENIPUNCTURE: CPT

## 2025-04-16 RX ORDER — MULTIVITAMIN WITH IRON
1 TABLET ORAL DAILY
COMMUNITY

## 2025-04-16 RX ORDER — SODIUM CHLORIDE 9 MG/ML
INJECTION, SOLUTION INTRAVENOUS CONTINUOUS
OUTPATIENT
Start: 2025-04-16

## 2025-04-16 RX ORDER — MULTIVITAMIN
1 TABLET ORAL DAILY
COMMUNITY

## 2025-04-16 NOTE — PROGRESS NOTES
[facility]thopedic Clinic    Chief Complaint:  Left wrist pain      History of Present Illness: Beverly Lee is a 49 y.o. female here today for pre-op examination. Patient would like to schedule surgery for left DeQuervain release. Her last wrist injection was on 2024. She reports pain relief for about a month but the pain returned. She has no new complaints today.       Past Medical History:   Diagnosis Date    Abnormal Pap smear of cervix     Acute cystitis with hematuria 2023    ADHD     Anxiety and depression     Arthritis     BMI 36.0-36.9,adult 2023    Bowel incontinence     Breast pain, left 2024    Carpal tunnel syndrome, right     Gastritis     Hypertension     Respiratory distress  with anesthesia     Seasonal allergies     Vaginal fistula         Past Surgical History:   Procedure Laterality Date    carpal tunnel released Right      SECTION      CHOLECYSTECTOMY      COLONOSCOPY, WITH POLYPECTOMY USING HOT BIOPSY FORCEPS N/A 2024    Procedure: COLONOSCOPY, WITH POLYPECTOMY USING HOT BIOPSY FORCEPS;  Surgeon: SUKUMAR Quinones MD;  Location: [facility] ENDOSCOPY;  Service: Gastroenterology;  Laterality: N/A;  Cold forceps    COLONOSCOPY, WITH POLYPECTOMY USING SNARE N/A 2024    Procedure: COLONOSCOPY, WITH POLYPECTOMY USING SNARE;  Surgeon: SUKUMAR Quinones MD;  Location: [facility] ENDOSCOPY;  Service: Gastroenterology;  Laterality: N/A;    gastric sleeve  2022    PENECTOMY, TOTAL, RADICAL, WITH LYMPHADENECTOMY      RECTAL SURGERY      REDUCTION OF BOTH BREASTS Bilateral 2024    Procedure: MAMMOPLASTY, REDUCTION, BILATERAL (bilateral breast reduction with free nipple grafts);  Surgeon: Jhon Lal MD;  Location: [facility];  Service: Plastics;  Laterality: Bilateral;  468 grams removed from right breast, 412 grams removed from left breast    REPAIR OF ACHILLES TENDON Right     STIMULATOR PLACEMENT AND REMOVAL      STATES TO STIMULATE BOWELS    TONSILLECTOMY       VAGINA SURGERY      vaginal fistula    WISDOM TOOTH EXTRACTION          Social History     Tobacco Use    Smoking status: Never    Smokeless tobacco: Never   Substance and Sexual Activity    Alcohol use: Not Currently    Drug use: Never    Sexual activity: Yes     Partners: Male        Current Outpatient Medications   Medication Instructions    aluminum & magnesium hydroxide-simethicone (MYLANTA MAX STRENGTH) 400-400-40 mg/5 mL suspension 5 mLs, Every 6 hours PRN    calcium-vitamin D3 (OS-FIGUEROA 500 + D3) 500 mg-5 mcg (200 unit) per tablet 1 tablet    cephALEXin (KEFLEX) 500 mg, 4 times daily    cetirizine (ZYRTEC) 10 mg, Daily PRN    cholestyramine (QUESTRAN) 4 gram packet 4 g, Oral, 3 times daily with meals    clindamycin (CLEOCIN T) 1 % Swab SMARTSI Topical Every Morning    clotrimazole (LOTRIMIN) 1 % cream Topical (Top), 2 times daily    dextroamphetamine-amphetamine (ADDERALL XR) 30 MG 24 hr capsule 30 mg, Every morning    ferrous gluconate (FERGON) 324 mg, Oral, Every other day    finasteride (PROSCAR) 5 mg, Daily    fluticasone propionate (FLONASE) 100 mcg, Each Nostril, 2 times daily, prn    ibuprofen (ADVIL,MOTRIN) 800 mg, Oral, 3 times daily    LIDOcaine-diphenhyd-Al-mag-sim (FIRST-MOUTHWASH BLM) -179-40 mg/30mL mouthwash suspension 10 mLs, Swish & Spit, 3 times daily    LINZESS 145 mcg, Daily    minoxidiL (LONITEN) 1.25 mg, Daily    mupirocin (BACTROBAN) 2 % ointment     MV-MN-IRON FUM-FA-OMEGA3,6,9#3 ORAL 1 tablet, Daily    nystatin-triamcinolone (MYCOLOG II) cream 2 times daily PRN    omeprazole (PRILOSEC) 20 mg, Daily    ondansetron (ZOFRAN-ODT) 4 mg, Every 8 hours PRN    PaxiL 30 mg, Every morning    sucralfate (CARAFATE) 1 g, 4 times daily    traZODone (DESYREL)  mg, Nightly       Review of patient's allergies indicates:   Allergen Reactions    Macrolide antibiotics Palpitations    Nitrofurantoin Palpitations        Patient Care Team:  Walter,  L, NP as PCP - General (Nurse  "Practitioner)     Review of Systems:    Constitution:   Denies chills, fever, and sweats.  HENT:   Denies headaches or blurry vision.  Cardiovascular:   Denies chest pain or irregular heart beat.  Respiratory:   Denies cough or shortness of breath.  Gastrointestinal:  Denies abdominal pain, nausea, or vomiting.  Musculoskeletal:   Denies muscle cramps.  Neurological:   Denies dizziness or focal weakness.  Psychiatric/Behavior: Normal mental status.  Hematology/Lymph:  Denies bleeding problem or easy bruising/bleeding.  Skin:    Denies rash or suspicious lesions.    Physical Examination:    Vital Signs:    Vitals:    04/16/25 0852   BP: 138/83   Pulse: 80   Height: 5' 2" (1.575 m)   Body mass index is 35.85 kg/m².    Constitution:   Well-developed, well nourished patient in no acute distress.  Neurological:   Alert and oriented x 3 and cooperative to examination.     Psychiatric/Behavior: Normal mental status.  Respiratory:   No shortness of breath. Non-labored breathing.  Eyes:    Extraoccular muscles intact.    Musculoskeletal Examination:     Left Upper Extremity:  [x] No open wounds or rashes.    [] Abnormal skin findings:.  [x] Full ROM of the wrist, hand and digits.    [] Limited ROM of the wrist, hand, and digits:   [x] Radial pulses 2+ is warm well perfused.      Positive Finkelstein's test  Tenderness to palpation of radial aspect of wrist  Mild swelling noted radial aspect of wrist       Imaging:   X-rays of the left wrist three views show no abnormal findings     Assessment:  Left de Quervain's tenosynovitis    Plan:  Patient is still experiencing pain despite injections. We discussed operative and nonoperative options. The patient had an opportunity to ask questions. All questions were answered. The risks and benefits of surgery were discussed with the patient, including but not limited to bleeding, infection, damage to surrounding nerves and structures, need for further surgery, repair failure, " anesthesia risk, progression of arthritis, blood clots, and medical risks of surgery. The patient voiced understanding of the risks and benefits and provided written consent to the procedure. Plan for left DeQuervain release on 5/1/2025.     Follow Up:  After surgery    X-Ray at Next Visit:  None

## 2025-04-16 NOTE — H&P (VIEW-ONLY)
Orthopedic Clinic    Chief Complaint:  Left wrist pain      History of Present Illness: Beverly Lee is a 49 y.o. female here today for pre-op examination. Patient would like to schedule surgery for left DeQuervain release. Her last wrist injection was on 2024. She reports pain relief for about a month but the pain returned. She has no new complaints today.       Past Medical History:   Diagnosis Date    Abnormal Pap smear of cervix     Acute cystitis with hematuria 2023    ADHD     Anxiety and depression     Arthritis     BMI 36.0-36.9,adult 2023    Bowel incontinence     Breast pain, left 2024    Carpal tunnel syndrome, right     Gastritis     Hypertension     Respiratory distress  with anesthesia     Seasonal allergies     Vaginal fistula         Past Surgical History:   Procedure Laterality Date    carpal tunnel released Right      SECTION      CHOLECYSTECTOMY      COLONOSCOPY, WITH POLYPECTOMY USING HOT BIOPSY FORCEPS N/A 2024    Procedure: COLONOSCOPY, WITH POLYPECTOMY USING HOT BIOPSY FORCEPS;  Surgeon: SUKUMAR Quinones MD;  Location: [facility] ENDOSCOPY;  Service: Gastroenterology;  Laterality: N/A;  Cold forceps    COLONOSCOPY, WITH POLYPECTOMY USING SNARE N/A 2024    Procedure: COLONOSCOPY, WITH POLYPECTOMY USING SNARE;  Surgeon: SUKUMAR Quinones MD;  Location: [facility] ENDOSCOPY;  Service: Gastroenterology;  Laterality: N/A;    gastric sleeve  2022    PENECTOMY, TOTAL, RADICAL, WITH LYMPHADENECTOMY      RECTAL SURGERY      REDUCTION OF BOTH BREASTS Bilateral 2024    Procedure: MAMMOPLASTY, REDUCTION, BILATERAL (bilateral breast reduction with free nipple grafts);  Surgeon: Jhon Lal MD;  Location: [facility];  Service: Plastics;  Laterality: Bilateral;  468 grams removed from right breast, 412 grams removed from left breast    REPAIR OF ACHILLES TENDON Right     STIMULATOR PLACEMENT AND REMOVAL      STATES TO STIMULATE BOWELS    TONSILLECTOMY       VAGINA SURGERY      vaginal fistula    WISDOM TOOTH EXTRACTION          Social History     Tobacco Use    Smoking status: Never    Smokeless tobacco: Never   Substance and Sexual Activity    Alcohol use: Not Currently    Drug use: Never    Sexual activity: Yes     Partners: Male        Current Outpatient Medications   Medication Instructions    aluminum & magnesium hydroxide-simethicone (MYLANTA MAX STRENGTH) 400-400-40 mg/5 mL suspension 5 mLs, Every 6 hours PRN    calcium-vitamin D3 (OS-FIGUEROA 500 + D3) 500 mg-5 mcg (200 unit) per tablet 1 tablet    cephALEXin (KEFLEX) 500 mg, 4 times daily    cetirizine (ZYRTEC) 10 mg, Daily PRN    cholestyramine (QUESTRAN) 4 gram packet 4 g, Oral, 3 times daily with meals    clindamycin (CLEOCIN T) 1 % Swab SMARTSI Topical Every Morning    clotrimazole (LOTRIMIN) 1 % cream Topical (Top), 2 times daily    dextroamphetamine-amphetamine (ADDERALL XR) 30 MG 24 hr capsule 30 mg, Every morning    ferrous gluconate (FERGON) 324 mg, Oral, Every other day    finasteride (PROSCAR) 5 mg, Daily    fluticasone propionate (FLONASE) 100 mcg, Each Nostril, 2 times daily, prn    ibuprofen (ADVIL,MOTRIN) 800 mg, Oral, 3 times daily    LIDOcaine-diphenhyd-Al-mag-sim (FIRST-MOUTHWASH BLM) -464-40 mg/30mL mouthwash suspension 10 mLs, Swish & Spit, 3 times daily    LINZESS 145 mcg, Daily    minoxidiL (LONITEN) 1.25 mg, Daily    mupirocin (BACTROBAN) 2 % ointment     MV-MN-IRON FUM-FA-OMEGA3,6,9#3 ORAL 1 tablet, Daily    nystatin-triamcinolone (MYCOLOG II) cream 2 times daily PRN    omeprazole (PRILOSEC) 20 mg, Daily    ondansetron (ZOFRAN-ODT) 4 mg, Every 8 hours PRN    PaxiL 30 mg, Every morning    sucralfate (CARAFATE) 1 g, 4 times daily    traZODone (DESYREL)  mg, Nightly       Review of patient's allergies indicates:   Allergen Reactions    Macrolide antibiotics Palpitations    Nitrofurantoin Palpitations        Patient Care Team:  Walter,  L, NP as PCP - General (Nurse  "Practitioner)     Review of Systems:    Constitution:   Denies chills, fever, and sweats.  HENT:   Denies headaches or blurry vision.  Cardiovascular:   Denies chest pain or irregular heart beat.  Respiratory:   Denies cough or shortness of breath.  Gastrointestinal:  Denies abdominal pain, nausea, or vomiting.  Musculoskeletal:   Denies muscle cramps.  Neurological:   Denies dizziness or focal weakness.  Psychiatric/Behavior: Normal mental status.  Hematology/Lymph:  Denies bleeding problem or easy bruising/bleeding.  Skin:    Denies rash or suspicious lesions.    Physical Examination:    Vital Signs:    Vitals:    04/16/25 0852   BP: 138/83   Pulse: 80   Height: 5' 2" (1.575 m)   Body mass index is 35.85 kg/m².    Constitution:   Well-developed, well nourished patient in no acute distress.  Neurological:   Alert and oriented x 3 and cooperative to examination.     Psychiatric/Behavior: Normal mental status.  Respiratory:   No shortness of breath. Non-labored breathing.  Eyes:    Extraoccular muscles intact.    Musculoskeletal Examination:     Left Upper Extremity:  [x] No open wounds or rashes.    [] Abnormal skin findings:.  [x] Full ROM of the wrist, hand and digits.    [] Limited ROM of the wrist, hand, and digits:   [x] Radial pulses 2+ is warm well perfused.      Positive Finkelstein's test  Tenderness to palpation of radial aspect of wrist  Mild swelling noted radial aspect of wrist       Imaging:   X-rays of the left wrist three views show no abnormal findings     Assessment:  Left de Quervain's tenosynovitis    Plan:  Patient is still experiencing pain despite injections. We discussed operative and nonoperative options. The patient had an opportunity to ask questions. All questions were answered. The risks and benefits of surgery were discussed with the patient, including but not limited to bleeding, infection, damage to surrounding nerves and structures, need for further surgery, repair failure, " anesthesia risk, progression of arthritis, blood clots, and medical risks of surgery. The patient voiced understanding of the risks and benefits and provided written consent to the procedure. Plan for left DeQuervain release on 5/1/2025.     Follow Up:  After surgery    X-Ray at Next Visit:  None

## 2025-04-17 LAB
OHS QRS DURATION: 80 MS
OHS QTC CALCULATION: 448 MS

## 2025-05-01 ENCOUNTER — HOSPITAL ENCOUNTER (OUTPATIENT)
Facility: HOSPITAL | Age: 50
Discharge: HOME | End: 2025-05-01
Attending: REHABILITATION UNIT | Admitting: REHABILITATION UNIT
Payer: MEDICARE

## 2025-05-01 ENCOUNTER — ANESTHESIA (OUTPATIENT)
Dept: SURGERY | Facility: HOSPITAL | Age: 50
End: 2025-05-01
Payer: MEDICARE

## 2025-05-01 DIAGNOSIS — M65.4 DE QUERVAIN'S TENOSYNOVITIS, LEFT: ICD-10-CM

## 2025-05-01 DIAGNOSIS — G89.18 POST-OP PAIN: Primary | ICD-10-CM

## 2025-05-01 LAB
B-HCG UR QL: NEGATIVE
CTP QC/QA: YES

## 2025-05-01 PROCEDURE — 25000 INCISION OF TENDON SHEATH: CPT | Mod: LT,,, | Performed by: REHABILITATION UNIT

## 2025-05-01 PROCEDURE — 71000015 HC POSTOP RECOV 1ST HR: Performed by: REHABILITATION UNIT

## 2025-05-01 PROCEDURE — 63600175 PHARM REV CODE 636 W HCPCS

## 2025-05-01 PROCEDURE — 36000707: Performed by: REHABILITATION UNIT

## 2025-05-01 PROCEDURE — 99499 UNLISTED E&M SERVICE: CPT | Mod: AS,,,

## 2025-05-01 PROCEDURE — 71000016 HC POSTOP RECOV ADDL HR: Performed by: REHABILITATION UNIT

## 2025-05-01 PROCEDURE — 25000003 PHARM REV CODE 250: Performed by: REHABILITATION UNIT

## 2025-05-01 PROCEDURE — 25000003 PHARM REV CODE 250

## 2025-05-01 PROCEDURE — 36000706: Performed by: REHABILITATION UNIT

## 2025-05-01 PROCEDURE — 63600175 PHARM REV CODE 636 W HCPCS: Performed by: REHABILITATION UNIT

## 2025-05-01 PROCEDURE — 81025 URINE PREGNANCY TEST: CPT | Performed by: REHABILITATION UNIT

## 2025-05-01 PROCEDURE — 37000008 HC ANESTHESIA 1ST 15 MINUTES: Performed by: REHABILITATION UNIT

## 2025-05-01 PROCEDURE — 37000009 HC ANESTHESIA EA ADD 15 MINS: Performed by: REHABILITATION UNIT

## 2025-05-01 RX ORDER — MIDAZOLAM HYDROCHLORIDE 1 MG/ML
INJECTION INTRAMUSCULAR; INTRAVENOUS
Status: DISCONTINUED | OUTPATIENT
Start: 2025-05-01 | End: 2025-05-01

## 2025-05-01 RX ORDER — MORPHINE SULFATE 4 MG/ML
4 INJECTION, SOLUTION INTRAMUSCULAR; INTRAVENOUS
Refills: 0 | Status: DISCONTINUED | OUTPATIENT
Start: 2025-05-01 | End: 2025-05-01 | Stop reason: HOSPADM

## 2025-05-01 RX ORDER — LIDOCAINE HYDROCHLORIDE 10 MG/ML
INJECTION, SOLUTION EPIDURAL; INFILTRATION; INTRACAUDAL; PERINEURAL
Status: DISCONTINUED | OUTPATIENT
Start: 2025-05-01 | End: 2025-05-01 | Stop reason: HOSPADM

## 2025-05-01 RX ORDER — SODIUM CHLORIDE, SODIUM GLUCONATE, SODIUM ACETATE, POTASSIUM CHLORIDE AND MAGNESIUM CHLORIDE 30; 37; 368; 526; 502 MG/100ML; MG/100ML; MG/100ML; MG/100ML; MG/100ML
INJECTION, SOLUTION INTRAVENOUS CONTINUOUS
OUTPATIENT
Start: 2025-05-01 | End: 2025-05-31

## 2025-05-01 RX ORDER — CEFAZOLIN 2 G/1
2 INJECTION, POWDER, FOR SOLUTION INTRAMUSCULAR; INTRAVENOUS
Status: DISCONTINUED | OUTPATIENT
Start: 2025-05-01 | End: 2025-05-01 | Stop reason: HOSPADM

## 2025-05-01 RX ORDER — OXYCODONE AND ACETAMINOPHEN 5; 325 MG/1; MG/1
1 TABLET ORAL EVERY 6 HOURS PRN
Qty: 28 EACH | Refills: 0 | Status: SHIPPED | OUTPATIENT
Start: 2025-05-01

## 2025-05-01 RX ORDER — GLUCAGON 1 MG
1 KIT INJECTION
OUTPATIENT
Start: 2025-05-01

## 2025-05-01 RX ORDER — ONDANSETRON HYDROCHLORIDE 2 MG/ML
4 INJECTION, SOLUTION INTRAVENOUS EVERY 6 HOURS PRN
Status: DISCONTINUED | OUTPATIENT
Start: 2025-05-01 | End: 2025-05-01 | Stop reason: HOSPADM

## 2025-05-01 RX ORDER — LIDOCAINE HYDROCHLORIDE 10 MG/ML
INJECTION, SOLUTION INFILTRATION; PERINEURAL
Status: COMPLETED
Start: 2025-05-01 | End: 2025-05-01

## 2025-05-01 RX ORDER — SODIUM CHLORIDE 9 MG/ML
INJECTION, SOLUTION INTRAVENOUS CONTINUOUS
Status: DISCONTINUED | OUTPATIENT
Start: 2025-05-01 | End: 2025-05-01 | Stop reason: HOSPADM

## 2025-05-01 RX ORDER — LIDOCAINE HYDROCHLORIDE 10 MG/ML
INJECTION, SOLUTION EPIDURAL; INFILTRATION; INTRACAUDAL; PERINEURAL
Status: DISCONTINUED | OUTPATIENT
Start: 2025-05-01 | End: 2025-05-01

## 2025-05-01 RX ORDER — LIDOCAINE HYDROCHLORIDE 10 MG/ML
1 INJECTION, SOLUTION EPIDURAL; INFILTRATION; INTRACAUDAL; PERINEURAL ONCE
Status: DISCONTINUED | OUTPATIENT
Start: 2025-05-01 | End: 2025-05-01 | Stop reason: HOSPADM

## 2025-05-01 RX ORDER — ONDANSETRON HYDROCHLORIDE 2 MG/ML
4 INJECTION, SOLUTION INTRAVENOUS DAILY PRN
OUTPATIENT
Start: 2025-05-01

## 2025-05-01 RX ORDER — HYDROCODONE BITARTRATE AND ACETAMINOPHEN 5; 325 MG/1; MG/1
1 TABLET ORAL EVERY 6 HOURS PRN
Qty: 28 TABLET | Refills: 0 | Status: SHIPPED | OUTPATIENT
Start: 2025-05-01 | End: 2025-05-01 | Stop reason: HOSPADM

## 2025-05-01 RX ORDER — DIPHENHYDRAMINE HYDROCHLORIDE 50 MG/ML
25 INJECTION, SOLUTION INTRAMUSCULAR; INTRAVENOUS EVERY 6 HOURS PRN
OUTPATIENT
Start: 2025-05-01

## 2025-05-01 RX ORDER — HYDROCODONE BITARTRATE AND ACETAMINOPHEN 5; 325 MG/1; MG/1
1 TABLET ORAL EVERY 4 HOURS PRN
Refills: 0 | Status: DISCONTINUED | OUTPATIENT
Start: 2025-05-01 | End: 2025-05-01 | Stop reason: HOSPADM

## 2025-05-01 RX ORDER — METOCLOPRAMIDE HYDROCHLORIDE 5 MG/ML
10 INJECTION INTRAMUSCULAR; INTRAVENOUS EVERY 6 HOURS PRN
Status: DISCONTINUED | OUTPATIENT
Start: 2025-05-01 | End: 2025-05-01 | Stop reason: HOSPADM

## 2025-05-01 RX ORDER — ACETAMINOPHEN 10 MG/ML
1000 INJECTION, SOLUTION INTRAVENOUS ONCE
OUTPATIENT
Start: 2025-05-01 | End: 2025-05-01

## 2025-05-01 RX ORDER — MUPIROCIN 20 MG/G
OINTMENT TOPICAL 2 TIMES DAILY
Status: DISCONTINUED | OUTPATIENT
Start: 2025-05-01 | End: 2025-05-01 | Stop reason: HOSPADM

## 2025-05-01 RX ORDER — OXYCODONE AND ACETAMINOPHEN 5; 325 MG/1; MG/1
1 TABLET ORAL ONCE
Refills: 0 | Status: COMPLETED | OUTPATIENT
Start: 2025-05-01 | End: 2025-05-01

## 2025-05-01 RX ORDER — MIDAZOLAM HYDROCHLORIDE 2 MG/2ML
2 INJECTION, SOLUTION INTRAMUSCULAR; INTRAVENOUS ONCE AS NEEDED
Status: DISCONTINUED | OUTPATIENT
Start: 2025-05-01 | End: 2025-05-01 | Stop reason: HOSPADM

## 2025-05-01 RX ORDER — HYDROMORPHONE HYDROCHLORIDE 2 MG/ML
0.2 INJECTION, SOLUTION INTRAMUSCULAR; INTRAVENOUS; SUBCUTANEOUS EVERY 5 MIN PRN
Refills: 0 | OUTPATIENT
Start: 2025-05-01

## 2025-05-01 RX ORDER — PROPOFOL 10 MG/ML
VIAL (ML) INTRAVENOUS
Status: DISCONTINUED | OUTPATIENT
Start: 2025-05-01 | End: 2025-05-01

## 2025-05-01 RX ORDER — METHOCARBAMOL 500 MG/1
500 TABLET, FILM COATED ORAL EVERY 6 HOURS PRN
Status: DISCONTINUED | OUTPATIENT
Start: 2025-05-01 | End: 2025-05-01 | Stop reason: HOSPADM

## 2025-05-01 RX ADMIN — OXYCODONE HYDROCHLORIDE AND ACETAMINOPHEN 1 TABLET: 5; 325 TABLET ORAL at 09:05

## 2025-05-01 RX ADMIN — MIDAZOLAM 2 MG: 1 INJECTION INTRAMUSCULAR; INTRAVENOUS at 07:05

## 2025-05-01 RX ADMIN — LIDOCAINE HYDROCHLORIDE 50 MG: 10 INJECTION, SOLUTION EPIDURAL; INFILTRATION; INTRACAUDAL; PERINEURAL at 07:05

## 2025-05-01 RX ADMIN — PROPOFOL 200 MCG/KG/MIN: 10 INJECTION, EMULSION INTRAVENOUS at 07:05

## 2025-05-01 RX ADMIN — SODIUM CHLORIDE: 9 INJECTION, SOLUTION INTRAVENOUS at 06:05

## 2025-05-01 RX ADMIN — CEFAZOLIN 2 G: 2 INJECTION, POWDER, FOR SOLUTION INTRAMUSCULAR; INTRAVENOUS at 07:05

## 2025-05-01 RX ADMIN — SODIUM CHLORIDE, SODIUM GLUCONATE, SODIUM ACETATE, POTASSIUM CHLORIDE AND MAGNESIUM CHLORIDE: 526; 502; 368; 37; 30 INJECTION, SOLUTION INTRAVENOUS at 07:05

## 2025-05-01 RX ADMIN — PROPOFOL 100 MG: 10 INJECTION, EMULSION INTRAVENOUS at 07:05

## 2025-05-01 NOTE — OP NOTE
Operative Report    Date of Operative Procedure: 2025           Location: [facility] OR 1     Name: Beverly Lee  : 1975   MRN: [id number]    Preoperative diagnosis: Left Dequervain's tenosynovitis  Postoperative diagnosis: same    Procedure:  1. Left first dorsal compartment release     Primary Surgeon: Fab Esquivel MD    Assistant: DAJUAN Deleon PA-C was essential for manipulation of the extremity, retraction, and closure.     Anesthesia: MAC/local    Antibiotics: Ancef 2g    Estimated blood loss: Less than 2 cc    Specimens: None    Complications: None    Implants: none    Indications for procedure: Patient is a 49 year old female who had persistent pain along radial styloid.  Subjective and objective signs of de Quervain tenosynovitis were observed. Patient did not respond to conservative treatment and, I recommended 1st dorsal compartment release for de Quervain tenosynovitis.  The planned procedure was discussed with the patient including the associated risks. The risks included but are not limited to bleeding, infection, nerve damage, failure to heal, possible need for reoperation, possible recurrence, or any associated risk of the anesthesia. Patient voiced understanding and agreed to proceed as planned.     Procedure:   Patient was met in the preoperative holding area where verbal and written informed consent were reobtained and confirmed from the patient.  The operative extremity was marked with an indelible ink marker.  Patient was then taken back to the operative suite and succumbed to anesthesia.  The patient was positioned supine with all bony prominences being well-padded.  A nonsterile tourniquet was applied to the operative extremity.  The operative extremity was then prepped and draped in the normal sterile fashion.  A preoperative timeout was performed in which the patient, site, side, and operation to be performed were confirmed. All were in agreement and  there were no concerns for moving forward. Preoperative IV antibiotics were administered and we commenced the procedure.      Planned skin incision was marked along the radial styloid. The upper extremity was then exsanguinated by esmarch. The tourniquet was then inflated to 250 mmHg. Local anesthetic was injected. Skin incision was then made and dissection was carried down to the 1st dorsal compartment.  I identified the radial sensory nerve and protected it throughout the case.  I incised the 1st dorsal compartment on the dorsal aspect.  Extensive synovitis was encountered.  Synovitis was excised sharply.  All sub sheaths were released.  Tendons were brought through the wound and there was no inhibition to motion and complete release was confirmed.  Wound was copiously irrigated with normal saline. The tourniquet was deflated and hemostasis was achieved.The wound was again copiously irrigated with normal saline. Additional local anesthetic was injected. Skin incisions were then closed in layers.  Sterile dressings and well-padded thumb spica splint was placed. The patient was then awakened from anesthesia. The patient was transported to recovery room in stable condition. There were no intraoperative or immediate postoperative complications. All counts were reported as correct.      Postoperative plan:   The patient will maintain the splint until follow up. Elevate operative extremity. The patient will be given standard medications for pain control. Follow up in 10-14 days for wound check.

## 2025-05-01 NOTE — TRANSFER OF CARE
"Anesthesia Transfer of Care Note    Patient: Beverly Lee    Procedure(s) Performed: Procedure(s) (LRB):  RELEASE, HAND, FOR DEQUERVAIN'S TENOSYNOVITIS (Left)    Patient location: OPS    Anesthesia Type: general    Transport from OR: Transported from OR on room air with adequate spontaneous ventilation    Post pain: adequate analgesia    Post assessment: no apparent anesthetic complications and tolerated procedure well    Post vital signs: stable    Level of consciousness: awake    Nausea/Vomiting: no nausea/vomiting    Complications: none    Transfer of care protocol was followed    Last vitals: Visit Vitals  /78 (BP Location: Left arm, Patient Position: Lying)   Pulse 65   Temp 35.7 °C (96.3 °F) (Tympanic)   Resp 20   Ht 5' 2" (1.575 m)   Wt 89.5 kg (197 lb 5 oz)   LMP 04/19/2025   SpO2 100%   Breastfeeding No   BMI 36.09 kg/m²     "

## 2025-05-01 NOTE — DISCHARGE INSTRUCTIONS
[facility] [place]. [address]  [phone]    DIET    YOUR FIRST MEAL SHOULD BE LIQUID: I.E. SOUPS, JELLO, JUICE. IF YOUR LIQUID MEAL IS TOLERATED WELL THEN YOU MAY PROGRESS TO A SMALL LIGHT MEAL.   IF NAUSEA AND VOMITING OCCUR RETURN TO THE LIQUID DIET AND PROGRESS TO A NORMAL SOLID DIET SLOWLY.  IF THE NAUSEA AND VOMITING DOES NOT STOP, NOTIFY YOUR HEALTH CARE PROVIDER.      GENERAL ANESTHESIA OR SEDATION    DO NOT DRIVE OR PARTICIPATE IN ANY ACTIVITIES THAT REQUIRE COORDINATION FOR THE NEXT 24 HOURS: I.E. SWIMMING, BIKING, OPERATING HEAVY MACHINERY, COOKING, USING POWER TOOLS, CLIMBING LADDERS.   FOR THE NEXT 24 HOURS DO NOT: DRIVE, DRINK ALCOHOL, MAKE ANY IMPORTANT DECISIONS OR SIGN ANY LEGAL DOCUMENTS.   STAY WITH AN ADULT DURING THE 24 HOURS AFTER YOUR SURGERY.   DRINK ENOUGH FLUIDS TO KEEP YOUR URINE CLEAR TO PALE YELLOW.      PREVENTING CONSTIPATION AFTER SURGERY    EAT FOOD HIGH IN FIBER AND DRINK PLENTY OF FLUIDS, ESPECIALLY WATER.   YOU MAY TAKE AN OVER THE COUNTER FIBER SUPPLEMENT OR STOOL SOFTENER AS DIRECTED ON THE PACKAGE.   STAYING MOBILE, IF POSSIBLE, HELPS TO PREVENT CONSTIPATION.  CONTACT YOUR DOCTOR IF YOU HAVE NOT HAD A BOWEL MOVEMENT IN 3 DAYS AFTER SURGERY.       INFECTION CONTROL    KEEP YOUR DRESSING CLEAN, DRY AND INTACT.   FOLLOW PHYSICIAN INSTRUCTIONS REGARDING REMOVAL OF DRESSING.  DO NOT TOUCH SURGICAL SITE OR APPLY LOTIONS, POWDERS, CREAMS OR OINTMENTS ON YOUR INCISION UNLESS INSTRUCTED TO DO SO BY YOUR HEALTH CARE PROVIDER.  ONCE THE DRESSING HAS BEEN REMOVED, CHECK THE INCISION SITE DAILY FOR: INCREASED REDNESS, SWELLING, FLUID OR BLOOD, WARMTH, PUS, FOUL SMELL OR INCREASED PAIN.      DEEP VEIN THROMBOSIS (DVT)    A DVT IS A BLOOD CLOT THAT CAN DEVELOP IN THE DEEP AND LARGER VEINS OF THE LEG, ARM OR PELVIS.   RISK FACTORS INCLUDE SITTING OR LYING FOR LONG PERIODS OF TIME. THIS INCLUDES RECOVERING FROM SURGERY.  PREVENTION INCLUDES:  AVOID SITTING STILL FOR LONG PERIODS WITHOUT MOVING YOUR LEGS, DO NOT SMOKE AND IF POSSIBLE AVOID MEDICATIONS THAT CONTAIN ESTROGEN.   SIGNS AND SYMPTOMS THAT SHOULD BE REPORTED IMMEDIATELY INCLUDE: SWELLING IN AN ARM OR LEG, WARMTH, REDNESS OR PAIN IN ONE AREA OF THE LEG OR ARM THAT DOES NOT COME FROM THE INCISION. IF THE CLOT IS IN YOUR LEG, THE SYMPTOMS WILL BE WORSE WHEN STANDING OR WALKING.   SIGNS OF A PULMONARY EMBOLISM OR PE (A CLOT THAT MOVED TO YOUR LUNG): SHORTNESS OF BREATH, COUGHING , ESPECIALLY IF ACCOMPANIED WITH BLOODY MUCUS, CHEST PAIN OR RAPID HEART RATE.  IF YOU EXPERIENCE THESE SYMPTOMS, YOU SHOULD GET EMERGENCY TREATMENT RIGHT AWAY. DO NOT WAIT TO SEE IF THESE SYMPTOMS WILL GO AWAY. DO NOT DRIVE YOURSELF TO THE HOSPITAL.       CONTACT YOUR HEALTH CARE PROVIDER IF:    YOU HAVE REDNESS, SWELLING OR PAIN AROUND YOUR INCISION.  YOUR INCISION FEELS WARM TO THE TOUCH OR IS LEAKING EXCESSIVE FLUID/ BLOOD.  YOUR SUTURES OR STAPLES HAVE COME UNDONE.  YOU HAVE A TEMPERATURE .5 OR GREATER.  YOU ARE NOT ABLE TO URINATE WITHIN 6 HOURS AFTER SURGERY.  YOU HAVE UNRESOLVED NAUSEA AND VOMITING.       SEEK IMMEDIATE MEDICAL CARE IF:    YOU HAVE PERSISTENT NAUSEA AND VOMITING.   YOU ARE UNABLE TO EAT OR DRINK.   YOU HAVE DIFFICULTY SPEAKING AND/ OR BREATHING.  YOUR SKIN COLOR APPEARS BLUE OR GRAY.  YOU HAVE A RED STREAK COMING FROM YOUR INCISION.  YOUR INCISION BLEEDS THROUGH THE DRESSING AND DOES NOT STOP WITH GENTLY PRESSURE.  YOU HAVE SEVERE PAIN THAT DOES NOT DECREASE WITH YOUR MEDICATIONS.

## 2025-05-01 NOTE — DISCHARGE SUMMARY
[facility] - Periop Services  Discharge Note  Short Stay    Procedure(s) (LRB):  RELEASE, HAND, FOR DEQUERVAIN'S TENOSYNOVITIS (Left)      OUTCOME: Patient tolerated treatment/procedure well without complication and is now ready for discharge.    DISPOSITION: Home or Self Care    FINAL DIAGNOSIS:  Left DeQuervain's tenosynovitis    FOLLOWUP: In clinic    DISCHARGE INSTRUCTIONS:    Discharge Procedure Orders   Diet general     Activity as tolerated     Keep surgical extremity elevated     Ice to affected area     Lifting restrictions   Order Comments: No lifting, pushing, pulling with operative extremity     No driving, operating heavy equipment or signing legal documents while taking pain medication.     Other restrictions (specify):   Order Comments: Okay to wean sling as tolerated.     Leave dressing on - Keep it clean, dry, and intact until clinic visit     Call MD for:  temperature >100.4     Call MD for:  persistent nausea and vomiting     Call MD for:  severe uncontrolled pain     Call MD for:  difficulty breathing, headache or visual disturbances     Call MD for:  redness, tenderness, or signs of infection (pain, swelling, redness, odor or green/yellow discharge around incision site)     Call MD for:  hives     Call MD for:  persistent dizziness or light-headedness     Call MD for:  extreme fatigue     Shower on day dressing removed (No bath)        TIME SPENT ON DISCHARGE: 5 minutes

## 2025-05-01 NOTE — ANESTHESIA PREPROCEDURE EVALUATION
"                                                                                                             2025  Beverly Lee is a 49 y.o., female with PMHx of obesity, HTN, anxiety/depression presents for  deQuervain's release of left wrist.  She is feeling well otherwise, denies cardiopulmonary compalints, denies GI symptoms.    "History of Present Illness: Beverly Lee is a 49 y.o. female here today for pre-op examination. Patient would like to schedule surgery for left DeQuervain release. Her last wrist injection was on 2024. She reports pain relief for about a month but the pain returned. She has no new complaints today.              Past Medical History:   Diagnosis Date    Abnormal Pap smear of cervix      Acute cystitis with hematuria 2023    ADHD      Anxiety and depression      Arthritis      BMI 36.0-36.9,adult 2023    Bowel incontinence      Breast pain, left 2024    Carpal tunnel syndrome, right      Gastritis      Hypertension      Respiratory distress  with anesthesia      Seasonal allergies      Vaginal fistula                 Past Surgical History:   Procedure Laterality Date    carpal tunnel released Right       SECTION        CHOLECYSTECTOMY        COLONOSCOPY, WITH POLYPECTOMY USING HOT BIOPSY FORCEPS N/A 2024     Procedure: COLONOSCOPY, WITH POLYPECTOMY USING HOT BIOPSY FORCEPS;  Surgeon: SUKUMAR Quinones MD;  Location: [facility] END[facility];  Service: Gastroenterology;  Laterality: N/A;  Cold forceps    COLONOSCOPY, WITH POLYPECTOMY USING SNARE N/A 2024     Procedure: COLONOSCOPY, WITH POLYPECTOMY USING SNARE;  Surgeon: SUKUMAR Quinones MD;  Location: [facility] ENDOS[facility];  Service: Gastroenterology;  Laterality: N/A;    gastric sleeve   2022    PENECTOMY, TOTAL, RADICAL, WITH LYMPHADENECTOMY        RECTAL SURGERY        REDUCTION OF BOTH BREASTS Bilateral 2024     Procedure: MAMMOPLASTY, REDUCTION, BILATERAL (bilateral breast reduction " "with free nipple grafts);  Surgeon: Jhon Lal MD;  Location: [facility];  Service: Plastics;  Laterality: Bilateral;  468 grams removed from right breast, 412 grams removed from left breast    REPAIR OF ACHILLES TENDON Right      STIMULATOR PLACEMENT AND REMOVAL         STATES TO STIMULATE BOWELS    TONSILLECTOMY        VAGINA SURGERY         vaginal fistula    WISDOM TOOTH EXTRACTION      "      Pre-op Assessment    I have reviewed the NPO Status.      Review of Systems  Anesthesia Hx:              Personal Hx of Anesthesia complications, Post-Operative Nausea/Vomiting, with every anesthetic, treatment not known                    Social:  Non-Smoker       Cardiovascular:     Hypertension, well controlled                                          Pulmonary:  Pulmonary Normal                       Renal/:  Renal/ Normal                 Hepatic/GI:  Hepatic/GI Normal                    Neurological:  Neurology Normal                                      Endocrine:        Obesity / BMI > 30  Psych:   anxiety depression                Physical Exam  General: Alert, Cooperative and Well nourished    Airway:  Mallampati: II   Mouth Opening: Normal  TM Distance: Normal  Tongue: Normal  Neck ROM: Normal ROM    Dental:  Partial Dentures    Chest/Lungs:  Clear to auscultation, Normal Respiratory Rate    Heart:  Rate: Normal  Rhythm: Regular Rhythm  Sounds: Normal              Anesthesia Plan  Type of Anesthesia, risks & benefits discussed:    Anesthesia Type: Gen Natural Airway  Intra-op Monitoring Plan: Standard ASA Monitors  Post Op Pain Control Plan: IV/PO Opioids PRN  Induction:  IV  Informed Consent: Informed consent signed with the Patient and all parties understand the risks and agree with anesthesia plan.  All questions answered.   ASA Score: 2  Day of Surgery Review of History & Physical: H&P Update referred to the surgeon/provider.  Anesthesia Plan Notes: Local per surgeon.  Advance airway management as " needed.    Ready For Surgery From Anesthesia Perspective.     .

## 2025-05-02 VITALS
RESPIRATION RATE: 18 BRPM | BODY MASS INDEX: 36.31 KG/M2 | OXYGEN SATURATION: 98 % | WEIGHT: 197.31 LBS | SYSTOLIC BLOOD PRESSURE: 114 MMHG | DIASTOLIC BLOOD PRESSURE: 67 MMHG | TEMPERATURE: 97 F | HEART RATE: 70 BPM | HEIGHT: 62 IN

## 2025-05-02 NOTE — ANESTHESIA POSTPROCEDURE EVALUATION
Anesthesia Post Evaluation    Patient: Beverly Lee    Procedure(s) Performed: Procedure(s) (LRB):  RELEASE, HAND, FOR DEQUERVAIN'S TENOSYNOVITIS (Left)    Final Anesthesia Type: general      Patient location during evaluation: PACU  Patient participation: Yes- Able to Participate  Level of consciousness: awake and alert  Post-procedure vital signs: reviewed and stable  Pain management: adequate  Airway patency: patent      Anesthetic complications: no      Cardiovascular status: blood pressure returned to baseline  Respiratory status: unassisted  Hydration status: euvolemic  Follow-up not needed.              Vitals Value Taken Time   /67 05/01/25 09:17   Temp 36.1 °C (97 °F) 05/01/25 09:15   Pulse 62 05/01/25 09:29   Resp 18 05/01/25 09:15   SpO2 98 % 05/01/25 09:29   Vitals shown include unfiled device data.      No case tracking events are documented in the log.      Pain/Vero Score: Pain Rating Prior to Med Admin: 5 (5/1/2025  9:01 AM)  Pain Rating Post Med Admin: 3 (5/1/2025  9:37 AM)  Modified Vero Score: 20 (5/1/2025  8:10 AM)           August 26, 2021       Oly He PA-C  [address] [facility] [address]  Via In Basket      Patient: Nancy Zimmerman   YOB: 1941   Date of Visit: 8/26/2021       Dear Dr. He:    Thank you for referring Jeanine Zimmerman to me for evaluation. Below are my notes for this visit with her.    If you have questions, please do not hesitate to call me. I look forward to following your patient along with you.      Sincerely,        Alicja Delgado DO        CC: No Recipients  Alicja Delgado DO  8/26/2021  2:14 PM  Signed  Cardiac Consultation.    Referring  Physician   No Pcp  No address on file         HPI   The patient is a 80 year old female here today for an initial cardiovascular evaluation.     Nancy is pleasant 80-year-old female with past medical history significant for mild aortic stenosis, chronic diastolic CHF, recently diagnosed atrial fibrillation status post failed cardioversion and flecainide followed by PVI ablation on 10/26/20 who presented for evaluation of worsening shortness of breath, palpitations with minimal exertion.    Patient underwent cardioversion after 6 weeks of anticoagulation with Eliquis  Unfortunately, she remained in normal sinus rhythm only for 5 days and converted back to A. fib couple of days later  She was symptomatic with atrial fibrillation -shortness of breath - limiting factor    Heart rate was under reasonable control on flecainide and metoprolol.   She did not do well on high dose of beta-blockers only.  Combination of metoprolol and digoxin worked well from rate control purposes before cardioversion, but she remained symptomatic.     She underwent ablation with Dr. Garcia on 10/26/20. She was feeling significantly better after procedure until recently.     Nancy denies lower extremity edema, chest discomfort  She is now extremely limited in terms of functional capacity due to dyspnea.  Onset of symptoms -3 weeks ago    Patient never had sleep  studies, not aware about possible sleep apnea  She is on minimal dose of beta-blockers at the moment    Today, Nancy reports improvement in dyspnea, but symptoms still present.  There was one very brief episode of atrial fibrillation on 7 days event monitor.  No RVR    Most recent 2D echo showed worsening degree of aortic stenosis  She started risk factors modification and lost 3 lb so far     Past Medical History:   Past Medical History:   Diagnosis Date   • Aortic stenosis, mild    • Atrial fibrillation (CMS/HCC)    • Congestive cardiac failure (CMS/HCC)    • Patient denies medical problems      Past Surgical History:    Past Surgical History:   Procedure Laterality Date   • Cholecystectomy     • Gallbladder surgery  02/03/2010   • Joint replacement Bilateral 06/03/2003   • Removal gallbladder       Family History:   Family History   Problem Relation Age of Onset   • Stroke Father    • Myocardial Infarction Father      Social History:   Social History     Socioeconomic History   • Marital status:      Spouse name: Not on file   • Number of children: Not on file   • Years of education: Not on file   • Highest education level: Not on file   Occupational History   • Occupation: RETIRED    Tobacco Use   • Smoking status: Never Smoker   • Smokeless tobacco: Never Used   Substance and Sexual Activity   • Alcohol use: Not Currently     Comment: social   • Drug use: Never   • Sexual activity: Not on file   Other Topics Concern   • Not on file   Social History Narrative   • Not on file     Social Determinants of Health     Financial Resource Strain: Low Risk    • Social Determinants: Financial Resource Strain: None   Food Insecurity: No Food Insecurity   • Social Determinants: Food Insecurity: Never   Transportation Needs: No Transportation Needs   • Lack of Transportation (Medical): No   • Lack of Transportation (Non-Medical): No   Physical Activity: Sufficiently Active   • Days of Exercise  per Week: 5 days   • Minutes of Exercise per Session: 50 min   Stress: Low Risk    • Social Determinants: Stress: Not at all   Social Connections: Unknown   • Social Determinants: Social Connections: I choose not to answer the question   Intimate Partner Violence: Unknown   • Social Determinants: Intimate Partner Violence Past Fear: Patient declined to answer   • Social Determinants: Intimate Partner Violence Current Fear: Patient declined to answer     Allergies: ALLERGIES:  No Known Allergies  Medications Including OTC:  Current Outpatient Medications   Medication Sig Dispense Refill   • apixaBAN (ELIQUIS) 5 MG Tab Take 1 tablet by mouth every 12 hours. 180 tablet 0   • bumetanide (BUMEX) 1 MG tablet Take 1 tablet by mouth daily. 90 tablet 2   • spironolactone (ALDACTONE) 25 MG tablet Take 1 tablet by mouth daily. 90 tablet 3   • amLODIPine (NORVASC) 2.5 MG tablet TAKE 1 TABLET BY MOUTH EVERY DAY 90 tablet 1   • metoPROLOL tartrate (LOPRESSOR) 25 MG tablet Take 1 tablet by mouth every 12 hours. 180 tablet 3     No current facility-administered medications for this visit.       Review of Systems   Constitutional: Negative.    HENT: Negative.    Eyes: Negative.    Respiratory: Positive for shortness of breath.    Cardiovascular: Positive for palpitations.   Gastrointestinal: Negative.    Endocrine: Negative.    Genitourinary: Negative.    Musculoskeletal: Negative.    Skin: Negative.    Allergic/Immunologic: Negative.    Neurological: Negative.    Hematological: Negative.    Psychiatric/Behavioral: Negative.    All other systems reviewed and are negative.      Visit Vitals  /66 (BP Location: RUE - Right upper extremity, Patient Position: Sitting, Cuff Size: Large Adult)   Pulse 95   Temp 96.8 °F (36 °C) (Temporal)   Wt 104.9 kg (231 lb 3.2 oz)   SpO2 97%   BMI 37.32 kg/m²       Physical Exam  Constitutional:       Comments: obese   HENT:      Head: Normocephalic and atraumatic.   Eyes:      Pupils: Pupils are  equal, round, and reactive to light.   Neck:      Vascular: No carotid bruit or JVD.      Trachea: Trachea normal.   Cardiovascular:      Rate and Rhythm: Normal rate and regular rhythm.      Chest Wall: PMI is not displaced.      Pulses: No decreased pulses.      Heart sounds: S1 normal and S2 normal. Murmur heard.   Systolic murmur is present with a grade of 2/6.   No friction rub. No S3 or S4 sounds.    Pulmonary:      Effort: No respiratory distress.      Breath sounds: Normal breath sounds. No decreased breath sounds or wheezing.   Abdominal:      General: There is no distension or abdominal bruit.      Palpations: Abdomen is soft.      Tenderness: There is no abdominal tenderness.   Musculoskeletal:         General: Normal range of motion.      Cervical back: Normal range of motion and neck supple. No edema or erythema. No muscular tenderness.   Skin:     General: Skin is warm.      Coloration: Skin is not pale.      Nails: There is no clubbing.   Neurological:      Mental Status: She is alert and oriented to person, place, and time.      Sensory: No sensory deficit.   Psychiatric:         Speech: Speech normal.         Behavior: Behavior normal. Behavior is cooperative.         All previous records reviewed    Laboratory Data  Office Visit on 05/05/2021   Component Date Value Ref Range Status   • NT-proBNP 05/05/2021 307  <=450 pg/mL Final   • Fasting Status 05/05/2021 0  Hours Final   • Sodium 05/05/2021 142  135 - 145 mmol/L Final   • Potassium 05/05/2021 4.0  3.4 - 5.1 mmol/L Final   • Chloride 05/05/2021 107  98 - 107 mmol/L Final   • Carbon Dioxide 05/05/2021 27  21 - 32 mmol/L Final   • Anion Gap 05/05/2021 12  10 - 20 mmol/L Final   • Glucose 05/05/2021 87  65 - 99 mg/dL Final   • BUN 05/05/2021 21* 6 - 20 mg/dL Final   • Creatinine 05/05/2021 1.10* 0.51 - 0.95 mg/dL Final   • Glomerular Filtration Rate 05/05/2021 48* >90 mL/min/1.73m2 Final   • BUN/ Creatinine Ratio 05/05/2021 19  7 - 25 Final   •  Calcium 05/05/2021 9.0  8.4 - 10.2 mg/dL Final   • Bilirubin, Total 05/05/2021 0.5  0.2 - 1.0 mg/dL Final   • GOT/AST 05/05/2021 15  <=37 Units/L Final   • GPT/ALT 05/05/2021 23  <64 Units/L Final   • Alkaline Phosphatase 05/05/2021 84  45 - 117 Units/L Final   • Albumin 05/05/2021 3.5* 3.6 - 5.1 g/dL Final   • Protein, Total 05/05/2021 7.3  6.4 - 8.2 g/dL Final   • Globulin 05/05/2021 3.8  2.0 - 4.0 g/dL Final   • A/G Ratio 05/05/2021 0.9* 1.0 - 2.4 Final   • WBC 05/05/2021 9.8  4.2 - 11.0 K/mcL Final   • RBC 05/05/2021 5.16  4.00 - 5.20 mil/mcL Final   • HGB 05/05/2021 14.7  12.0 - 15.5 g/dL Final   • HCT 05/05/2021 45.9  36.0 - 46.5 % Final   • MCV 05/05/2021 89.0  78.0 - 100.0 fl Final   • MCH 05/05/2021 28.5  26.0 - 34.0 pg Final   • MCHC 05/05/2021 32.0  32.0 - 36.5 g/dL Final   • RDW-CV 05/05/2021 13.6  11.0 - 15.0 % Final   • RDW-SD 05/05/2021 44.6  39.0 - 50.0 fL Final   • PLT 05/05/2021 216  140 - 450 K/mcL Final   • NRBC 05/05/2021 0  <=0 /100 WBC Final   • Neutrophil, Percent 05/05/2021 69  % Final   • Lymphocytes, Percent 05/05/2021 24  % Final   • Mono, Percent 05/05/2021 5  % Final   • Eosinophils, Percent 05/05/2021 1  % Final   • Basophils, Percent 05/05/2021 1  % Final   • Immature Granulocytes 05/05/2021 0  % Final   • Absolute Neutrophils 05/05/2021 6.8  1.8 - 7.7 K/mcL Final   • Absolute Lymphocytes 05/05/2021 2.4  1.0 - 4.0 K/mcL Final   • Absolute Monocytes 05/05/2021 0.5  0.3 - 0.9 K/mcL Final   • Absolute Eosinophils  05/05/2021 0.1  0.0 - 0.5 K/mcL Final   • Absolute Basophils 05/05/2021 0.1  0.0 - 0.3 K/mcL Final   • Absolute Immmature Granulocytes 05/05/2021 0.0  0.0 - 0.2 K/mcL Final   Admission on 10/26/2020, Discharged on 10/26/2020   Component Date Value Ref Range Status   • Activated Clotting Time 10/26/2020 334  Baseline/Target ranges are set by clinicians for each patient/procedure Final   • Activated Clotting Time 10/26/2020 358  Baseline/Target ranges are set by clinicians for  each patient/procedure Final   • Activated Clotting Time 10/26/2020 313  Baseline/Target ranges are set by clinicians for each patient/procedure Final   • Activated Clotting Time 10/26/2020 349  Baseline/Target ranges are set by clinicians for each patient/procedure Final   • Activated Clotting Time 10/26/2020 358  Baseline/Target ranges are set by clinicians for each patient/procedure Final   • Activated Clotting Time Plus 10/26/2020 334  SEC Final   • Activated Clotting Time Plus 10/26/2020 358  SEC Final   • Activated Clotting Time Plus 10/26/2020 313  SEC Final   • Activated Clotting Time Plus 10/26/2020 349  SEC Final   • Activated Clotting Time Plus 10/26/2020 358  SEC Final   Lab Services on 10/23/2020   Component Date Value Ref Range Status   • SOURCE, 2019 NOVEL CORONAVIRUS (SA* 10/23/2020 Nasopharyngeal   Final   • SARS-CoV-2 by PCR 10/23/2020 NOT DETECTED  NOT DETECTED Final   • Isolation Guidelines  10/23/2020     Final   Lab Services on 10/23/2020   Component Date Value Ref Range Status   • PROTIME 10/23/2020 10.9  9.7 - 11.8 sec Final   • INR 10/23/2020 1.0   Final   • WBC 10/23/2020 8.7  4.2 - 11.0 K/mcL Final   • RBC 10/23/2020 5.80* 4.00 - 5.20 mil/mcL Final   • HGB 10/23/2020 16.3* 12.0 - 15.5 g/dL Final   • HCT 10/23/2020 51.2* 36.0 - 46.5 % Final   • MCV 10/23/2020 88.3  78.0 - 100.0 fl Final   • MCH 10/23/2020 28.1  26.0 - 34.0 pg Final   • MCHC 10/23/2020 31.8* 32.0 - 36.5 g/dL Final   • RDW-CV 10/23/2020 14.5  11.0 - 15.0 % Final   • PLT 10/23/2020 238  140 - 450 K/mcL Final   • NRBC 10/23/2020 0  0 /100 WBC Final   • DIFF TYPE 10/23/2020 AUTOMATED DIFFERENTIAL   Final   • Neutrophil 10/23/2020 63  % Final   • LYMPH 10/23/2020 29  % Final   • MONO 10/23/2020 6  % Final   • EOSIN 10/23/2020 1  % Final   • BASO 10/23/2020 1  % Final   • Percent Immature Granuloctyes 10/23/2020 0  % Final   • Absolute Neutrophil 10/23/2020 5.5  1.8 - 7.7 K/mcL Final   • Absolute Lymph 10/23/2020 2.5  1.0 - 4.0  K/mcL Final   • Absolute Mono 10/23/2020 0.5  0.3 - 0.9 K/mcL Final   • Absolute Eos 10/23/2020 0.1  0.1 - 0.5 K/mcL Final   • Absolute Baso 10/23/2020 0.0  0.0 - 0.3 K/mcL Final   • Absolute Immature Granulocytes 10/23/2020 0.0  0 - 0.2 K/mcl Final   • Fasting Status 10/23/2020 0  hrs Final   • Sodium 10/23/2020 140  135 - 145 mmol/L Final   • Potassium 10/23/2020 4.9  3.4 - 5.1 mmol/L Final   • Chloride 10/23/2020 108* 98 - 107 mmol/L Final   • Carbon Dioxide 10/23/2020 27  21 - 32 mmol/L Final   • Anion Gap 10/23/2020 10  10 - 20 mmol/L Final   • Glucose 10/23/2020 97  65 - 99 mg/dL Final   • BUN 10/23/2020 24* 6 - 20 mg/dL Final   • Creatinine 10/23/2020 1.25* 0.51 - 0.95 mg/dL Final   • GFR Estimate,  10/23/2020 48   Final   • GFR Estimate, Non  10/23/2020 41   Final   • BUN/Creatinine Ratio 10/23/2020 19  7 - 25 Final   • CALCIUM 10/23/2020 8.9  8.4 - 10.2 mg/dL Final   Lab Services on 09/14/2020   Component Date Value Ref Range Status   • SOURCE, 2019 NOVEL CORONAVIRUS (SA* 09/14/2020 Nasopharyngeal   Final   • SARS-CoV-2 by PCR 09/14/2020 NOT DETECTED  NOT DETECTED Final   • Isolation Guidelines  09/14/2020     Final   Office Visit on 08/31/2020   Component Date Value Ref Range Status   • Sodium 08/31/2020 137  135 - 145 mmol/L Final   • Potassium 08/31/2020 4.3  3.4 - 5.1 mmol/L Final   • Chloride 08/31/2020 107  98 - 107 mmol/L Final   • Carbon Dioxide 08/31/2020 24  21 - 32 mmol/L Final   • Anion Gap 08/31/2020 10  10 - 20 mmol/L Final   • Glucose 08/31/2020 81  65 - 99 mg/dL Final   • BUN 08/31/2020 30* 6 - 20 mg/dL Final   • Creatinine 08/31/2020 1.33* 0.51 - 0.95 mg/dL Final   • GFR Estimate,  08/31/2020 45   Final   • GFR Estimate, Non  08/31/2020 38   Final   • BUN/Creatinine Ratio 08/31/2020 23  7 - 25 Final   • CALCIUM 08/31/2020 9.2  8.4 - 10.2 mg/dL Final   • NT proBNP 08/31/2020 1,545* <451 pg/mL Final   Lab Services on 08/07/2020    Component Date Value Ref Range Status   • NT proBNP 08/07/2020 768* <451 pg/mL Final   • Fasting Status 08/07/2020 UNKNOWN  hrs Final   • Sodium 08/07/2020 141  135 - 145 mmol/L Final   • Potassium 08/07/2020 4.4  3.4 - 5.1 mmol/L Final   • Chloride 08/07/2020 107  98 - 107 mmol/L Final   • Carbon Dioxide 08/07/2020 27  21 - 32 mmol/L Final   • Anion Gap 08/07/2020 11  10 - 20 mmol/L Final   • Glucose 08/07/2020 98  65 - 99 mg/dL Final   • BUN 08/07/2020 24* 6 - 20 mg/dL Final   • Creatinine 08/07/2020 1.22* 0.51 - 0.95 mg/dL Final   • GFR Estimate,  08/07/2020 49   Final   • GFR Estimate, Non  08/07/2020 43   Final   • BUN/Creatinine Ratio 08/07/2020 20  7 - 25 Final   • CALCIUM 08/07/2020 9.5  8.4 - 10.2 mg/dL Final   Admission on 07/29/2020, Discharged on 07/31/2020   Component Date Value Ref Range Status   • WBC 07/29/2020 8.5  4.2 - 11.0 K/mcL Final   • RBC 07/29/2020 5.74* 4.00 - 5.20 mil/mcL Final   • HGB 07/29/2020 16.1* 12.0 - 15.5 g/dL Final   • HCT 07/29/2020 52.1* 36.0 - 46.5 % Final   • MCV 07/29/2020 90.8  78.0 - 100.0 fl Final   • MCH 07/29/2020 28.0  26.0 - 34.0 pg Final   • MCHC 07/29/2020 30.9* 32.0 - 36.5 g/dL Final   • RDW-CV 07/29/2020 13.8  11.0 - 15.0 % Final   • PLT 07/29/2020 217  140 - 450 K/mcL Final   • NRBC 07/29/2020 0  0 /100 WBC Final   • DIFF TYPE 07/29/2020 AUTOMATED DIFFERENTIAL   Final   • Neutrophil 07/29/2020 61  % Final   • LYMPH 07/29/2020 29  % Final   • MONO 07/29/2020 7  % Final   • EOSIN 07/29/2020 2  % Final   • BASO 07/29/2020 1  % Final   • Percent Immature Granuloctyes 07/29/2020 0  % Final   • Absolute Neutrophil 07/29/2020 5.3  1.8 - 7.7 K/mcL Final   • Absolute Lymph 07/29/2020 2.5  1.0 - 4.0 K/mcL Final   • Absolute Mono 07/29/2020 0.6  0.3 - 0.9 K/mcL Final   • Absolute Eos 07/29/2020 0.1  0.1 - 0.5 K/mcL Final   • Absolute Baso 07/29/2020 0.1  0.0 - 0.3 K/mcL Final   • Absolute Immature Granulocytes 07/29/2020 0.0  0 - 0.2 K/mcl  Final   • Sodium 07/29/2020 140  135 - 145 mmol/L Final   • Potassium 07/29/2020 4.3  3.4 - 5.1 mmol/L Final   • Chloride 07/29/2020 109* 98 - 107 mmol/L Final   • Carbon Dioxide 07/29/2020 25  21 - 32 mmol/L Final   • Anion Gap 07/29/2020 10  10 - 20 mmol/L Final   • Glucose 07/29/2020 111* 65 - 99 mg/dL Final   • BUN 07/29/2020 22* 6 - 20 mg/dL Final   • Creatinine 07/29/2020 1.04* 0.51 - 0.95 mg/dL Final   • GFR Estimate,  07/29/2020 60   Final   • GFR Estimate, Non  07/29/2020 52   Final   • BUN/Creatinine Ratio 07/29/2020 21  7 - 25 Final   • CALCIUM 07/29/2020 8.8  8.4 - 10.2 mg/dL Final   • TOTAL BILIRUBIN 07/29/2020 0.8  0.2 - 1.0 mg/dL Final   • AST/SGOT 07/29/2020 16  <38 Units/L Final   • ALT/SGPT 07/29/2020 29  <64 Units/L Final   • ALK PHOSPHATASE 07/29/2020 73  45 - 117 Units/L Final   • TOTAL PROTEIN 07/29/2020 8.0  6.4 - 8.2 g/dL Final   • Albumin 07/29/2020 3.5* 3.6 - 5.1 g/dL Final   • GLOBULIN 07/29/2020 4.5* 2.0 - 4.0 g/dL Final   • A/G Ratio, Serum 07/29/2020 0.8* 1.0 - 2.4 Final   • TROPONIN I 07/29/2020 <0.02  <0.05 ng/mL Final   • PROTIME 07/29/2020 10.5  9.7 - 11.8 sec Final   • INR 07/29/2020 1.0   Final   • PTT 07/29/2020 26  22 - 32 sec Final   • Ventricular Rate EKG/Min (BPM) 07/29/2020 100   Final   • Atrial Rate (BPM) 07/29/2020 132   Final   • AL-Interval (MSEC) 07/29/2020 158   Final   • QRS-Interval (MSEC) 07/29/2020 70   Final   • QT-Interval (MSEC) 07/29/2020 355   Final   • QTc 07/29/2020 458   Final   • R Axis (Degrees) 07/29/2020 21   Final   • T Axis (Degrees) 07/29/2020 17   Final   • REPORT TEXT 07/29/2020    Final                    Value:Atrial fibrillation  Confirmed by OREN SALINAS DO ([id number]) on 7/29/2020 8:49:39 AM     • SOURCE, 2019 NOVEL CORONAVIRUS (SA* 07/29/2020 NASOPHARYNGEAL   Final   • SARS-CoV-2 by PCR 07/29/2020 NOT DETECTED  NOT DETECTED Final   • Isolation Guidelines  07/29/2020     Final   • NT proBNP 07/29/2020 983* <451  pg/mL Final   • D Dimer Quantitative 07/29/2020 1.67* <0.57 mg/L (FEU) Final   • Hemoglobin A1C 07/29/2020 5.8* 4.5 - 5.6 % Final   • WBC 07/30/2020 7.5  4.2 - 11.0 K/mcL Final   • RBC 07/30/2020 5.64* 4.00 - 5.20 mil/mcL Final   • HGB 07/30/2020 16.2* 12.0 - 15.5 g/dL Final   • HCT 07/30/2020 50.9* 36.0 - 46.5 % Final   • MCV 07/30/2020 90.2  78.0 - 100.0 fl Final   • MCH 07/30/2020 28.7  26.0 - 34.0 pg Final   • MCHC 07/30/2020 31.8* 32.0 - 36.5 g/dL Final   • RDW-CV 07/30/2020 13.6  11.0 - 15.0 % Final   • PLT 07/30/2020 184  140 - 450 K/mcL Final   • NRBC 07/30/2020 0  0 /100 WBC Final   • DIFF TYPE 07/30/2020 AUTOMATED DIFFERENTIAL   Final   • Neutrophil 07/30/2020 59  % Final   • LYMPH 07/30/2020 30  % Final   • MONO 07/30/2020 8  % Final   • EOSIN 07/30/2020 2  % Final   • BASO 07/30/2020 1  % Final   • Percent Immature Granuloctyes 07/30/2020 0  % Final   • Absolute Neutrophil 07/30/2020 4.5  1.8 - 7.7 K/mcL Final   • Absolute Lymph 07/30/2020 2.3  1.0 - 4.0 K/mcL Final   • Absolute Mono 07/30/2020 0.6  0.3 - 0.9 K/mcL Final   • Absolute Eos 07/30/2020 0.1  0.1 - 0.5 K/mcL Final   • Absolute Baso 07/30/2020 0.1  0.0 - 0.3 K/mcL Final   • Absolute Immature Granulocytes 07/30/2020 0.0  0 - 0.2 K/mcl Final   • Sodium 07/30/2020 140  135 - 145 mmol/L Final   • Potassium 07/30/2020 4.5  3.4 - 5.1 mmol/L Final   • Chloride 07/30/2020 108* 98 - 107 mmol/L Final   • Carbon Dioxide 07/30/2020 28  21 - 32 mmol/L Final   • Anion Gap 07/30/2020 8* 10 - 20 mmol/L Final   • Glucose 07/30/2020 95  65 - 99 mg/dL Final   • BUN 07/30/2020 22* 6 - 20 mg/dL Final   • Creatinine 07/30/2020 1.18* 0.51 - 0.95 mg/dL Final   • GFR Estimate,  07/30/2020 52   Final   • GFR Estimate, Non  07/30/2020 44   Final   • BUN/Creatinine Ratio 07/30/2020 19  7 - 25 Final   • CALCIUM 07/30/2020 9.0  8.4 - 10.2 mg/dL Final   • TOTAL BILIRUBIN 07/30/2020 0.8  0.2 - 1.0 mg/dL Final   • AST/SGOT 07/30/2020 20  <38  Units/L Final   • ALT/SGPT 07/30/2020 29  <64 Units/L Final   • ALK PHOSPHATASE 07/30/2020 71  45 - 117 Units/L Final   • TOTAL PROTEIN 07/30/2020 7.5  6.4 - 8.2 g/dL Final   • Albumin 07/30/2020 3.2* 3.6 - 5.1 g/dL Final   • GLOBULIN 07/30/2020 4.3* 2.0 - 4.0 g/dL Final   • A/G Ratio, Serum 07/30/2020 0.7* 1.0 - 2.4 Final   • TROPONIN I 07/30/2020 <0.02  <0.05 ng/mL Final   • Predicted HR Max 07/30/2020 143  BPM Final       EKG   Results for orders placed or performed during the hospital encounter of 07/29/20   Electrocardiogram 12-Lead   Result Value Ref Range    Ventricular Rate EKG/Min (BPM) 100     Atrial Rate (BPM) 132     RI-Interval (MSEC) 158     QRS-Interval (MSEC) 70     QT-Interval (MSEC) 355     QTc 458     R Axis (Degrees) 21     T Axis (Degrees) 17     REPORT TEXT       Atrial fibrillation  Confirmed by OREN SALINAS DO ([id number]) on 7/29/2020 8:49:39 AM         1. Left ventricle: The cavity size is normal. Wall thickness is increased.     Hypertrophy is noted. Systolic function is vigorous. The estimated     ejection fraction is 70-75%. Wall motion is normal; there are no     regional wall motion abnormalities. The study is not technically     sufficient to allow evaluation of LV diastolic function.  2. Aortic valve: The annulus is mildly calcified. The valve is probably     trileaflet. The leaflets are moderately calcified. There is mild     stenosis.  3. Aorta: The aortic root internal end-diastolic diameter is 3.2cm.  4. Mitral valve: The annulus is mildly calcified. The leaflets are normal     thickness. Mild regurgitation.  5. Left atrium: The atrium is mildly to moderately dilated.  6. Right ventricle: Systolic pressure is within the normal range. The     estimated peak pressure is 31mm Hg.  7. Tricuspid valve: Mild regurgitation.    EKG today - NSR with PAC's    Assessment   Problem List Items Addressed This Visit        Cardiac and Vasculature    Paroxysmal atrial fibrillation (CMS/HCC) -  Primary    Essential hypertension    Mixed hyperlipidemia    Aortic stenosis, mild    Chronic diastolic congestive heart failure (CMS/HCC)       Endocrine and Metabolic    Obesity (BMI 35.0-39.9 without comorbidity)      Nancy is pleasant 79-year-old female with past medical history significant for mild aortic stenosis, chronic diastolic CHF, recently diagnosed atrial fibrillation status post failed cardioversion and flecainide followed by PVI on 10/26/20 who presented for follow-up    Assessment  1.  Paroxysmal atrial fibrillation s/p ablation, now in NSR with isolated PACs, one episode of a-fib for 21sec was noted on 1 week event monitor in May 2021  2.  Long-term use of anticoagulation with Eliquis 5 mg p.o. twice daily  3.  Essential hypertension  4.  Chronic diastolic CHF on Bumex  5.  Hyperlipidemia  6.  Moderate aortic stenosis - worse from the previous study  7. Obesity with BMI of 38  8.  Shortness of breath with exertion - somewhat chronic     Plan:  Continue Eliquis 5MG po bid  Reduce Bumex to 0.5 mg p.o. daily for 1 week followed by 0.5mg po every other day for 1 week followed by 0.5 mg po twice per week. She was instructed to monitor weighgts, dyspnea and possible LE edema  Continue current antihypertensive regimen  Continue spironolactone 25mg po daily   1 week event monitor showed one brief episode of 1-fib ( 21 sec with rate of 102bpm )  2D ECHO - reviewed today - preserved LVEF and worsening aortic stenosis ( moderate from mild )  ProBNP 301  Low sodium diet   Weight loss -significant obesity is definitely contributing factor to dyspnea  Referral to sleep studies was given today and need reinforced again , she most likely has obstructive sleep apnea and will require CPAP  Pulmonary function test - discussed need with patient again    I reviewed all previous records and discussed with patient    Degree of valvular disease cannot explain dyspnea at this point.  proBNP level is low. Nuclear medicine  stress test -negative  Repeat 2D ECHO in 6 months      Return in about 4 months (around 12/26/2021).                       Alicja Delgado D.O.

## 2025-05-19 ENCOUNTER — OFFICE VISIT (OUTPATIENT)
Dept: ORTHOPEDICS | Facility: CLINIC | Age: 50
End: 2025-05-19
Payer: MEDICARE

## 2025-05-19 VITALS
WEIGHT: 197.31 LBS | HEIGHT: 62 IN | DIASTOLIC BLOOD PRESSURE: 79 MMHG | HEART RATE: 73 BPM | BODY MASS INDEX: 36.31 KG/M2 | SYSTOLIC BLOOD PRESSURE: 127 MMHG

## 2025-05-19 DIAGNOSIS — M65.4 DE QUERVAIN'S TENOSYNOVITIS, LEFT: Primary | ICD-10-CM

## 2025-05-19 PROCEDURE — 1159F MED LIST DOCD IN RCRD: CPT | Mod: CPTII,,,

## 2025-05-19 PROCEDURE — 1160F RVW MEDS BY RX/DR IN RCRD: CPT | Mod: CPTII,,,

## 2025-05-19 PROCEDURE — 3074F SYST BP LT 130 MM HG: CPT | Mod: CPTII,,,

## 2025-05-19 PROCEDURE — 3078F DIAST BP <80 MM HG: CPT | Mod: CPTII,,,

## 2025-05-19 PROCEDURE — 99024 POSTOP FOLLOW-UP VISIT: CPT | Mod: ,,,

## 2025-05-19 NOTE — PROGRESS NOTES
Orthopedic Clinic    Chief Complaint: Post Op Visit    Consulting Physician: No ref. provider found    left 1st dorsal compartment release on 25    History of Present Illness: Beverly Lee is a 49 y.o. female here today for post op DeQuervain's visit. Patient is doing well. No fevers or chills. Pain over the radial wrist has improved. No numbness or tingling.       Past Medical History:   Diagnosis Date    Abnormal Pap smear of cervix     Acute cystitis with hematuria 2023    ADHD     Anxiety and depression     Arthritis     BMI 36.0-36.9,adult 2023    Bowel incontinence     Breast pain, left 2024    Carpal tunnel syndrome, right     Gastritis     Hypertension     PONV (postoperative nausea and vomiting)     Respiratory distress  with anesthesia     Seasonal allergies     Vaginal fistula         Past Surgical History:   Procedure Laterality Date    BILATERAL TUBAL LIGATION      carpal tunnel released Right      SECTION      x2    CHOLECYSTECTOMY      COLONOSCOPY, WITH POLYPECTOMY USING HOT BIOPSY FORCEPS N/A 2024    Procedure: COLONOSCOPY, WITH POLYPECTOMY USING HOT BIOPSY FORCEPS;  Surgeon: SUKUMAR Quinones MD;  Location: [facility] ENDOSCOPY;  Service: Gastroenterology;  Laterality: N/A;  Cold forceps    COLONOSCOPY, WITH POLYPECTOMY USING SNARE N/A 2024    Procedure: COLONOSCOPY, WITH POLYPECTOMY USING SNARE;  Surgeon: SUKUMAR Quinones MD;  Location: [facility] ENDOSCOPY;  Service: Gastroenterology;  Laterality: N/A;    DE QUERVAIN'S RELEASE Left 2025    Procedure: RELEASE, HAND, FOR DEQUERVAIN'S TENOSYNOVITIS;  Surgeon: Fab Esquivel MD;  Location: [facility];  Service: Orthopedics;  Laterality: Left;    gastric sleeve  2022    PENECTOMY, TOTAL, RADICAL, WITH LYMPHADENECTOMY      RECTAL SURGERY      REDUCTION OF BOTH BREASTS Bilateral 2024    Procedure: MAMMOPLASTY, REDUCTION, BILATERAL (bilateral breast reduction with free nipple grafts);  Surgeon: Hali  Jhon CLARK MD;  Location: [facility] OR;  Service: Plastics;  Laterality: Bilateral;  468 grams removed from right breast, 412 grams removed from left breast    REPAIR OF ACHILLES TENDON Right     STIMULATOR PLACEMENT AND REMOVAL      STATES TO STIMULATE BOWELS    TONSILLECTOMY      VAGINA SURGERY      vaginal fistula    WISDOM TOOTH EXTRACTION          Social History     Tobacco Use    Smoking status: Never    Smokeless tobacco: Never   Substance and Sexual Activity    Alcohol use: Yes     Comment: rarely    Drug use: Yes     Types: Marijuana     Comment: last use 3 weeks ago    Sexual activity: Yes     Partners: Male        Current Outpatient Medications   Medication Instructions    aluminum & magnesium hydroxide-simethicone (MYLANTA MAX STRENGTH) 400-400-40 mg/5 mL suspension 5 mLs, Every 6 hours PRN    calcium-vitamin D3 (OS-FIGUEROA 500 + D3) 500 mg-5 mcg (200 unit) per tablet 1 tablet, Daily    cetirizine (ZYRTEC) 10 mg, Daily PRN    cholestyramine (QUESTRAN) 4 gram packet 4 g, Oral, 3 times daily with meals    clindamycin (CLEOCIN T) 1 % Swab     clotrimazole (LOTRIMIN) 1 % cream Topical (Top), 2 times daily    dextroamphetamine-amphetamine (ADDERALL XR) 30 MG 24 hr capsule 30 mg, Every morning    ferrous gluconate (FERGON) 324 mg, Oral, Every other day    finasteride (PROSCAR) 5 mg, Daily    fluticasone propionate (FLONASE) 100 mcg, Each Nostril, 2 times daily, prn    ibuprofen (ADVIL,MOTRIN) 800 mg, Oral, 3 times daily    LIDOcaine-diphenhyd-Al-mag-sim (FIRST-MOUTHWASH BLM) -156-40 mg/30mL mouthwash suspension 10 mLs, Swish & Spit, 3 times daily    LINZESS 145 mcg, As needed (PRN)    minoxidiL (LONITEN) 1.25 mg, Daily    multivitamin (ONE DAILY MULTIVITAMIN) per tablet 1 tablet, Daily    nystatin-triamcinolone (MYCOLOG II) cream 2 times daily PRN    omega-3 fatty acids/fish oil (FISH OIL-OMEGA-3 FATTY ACIDS) 300-1,000 mg capsule 1 capsule, Daily    omeprazole (PRILOSEC) 20 mg, As needed (PRN)    ondansetron  "(ZOFRAN-ODT) 4 mg, Every 8 hours PRN    oxyCODONE-acetaminophen (PERCOCET) 5-325 mg per tablet 1 tablet, Oral, Every 6 hours PRN    PaxiL 30 mg, Every morning    traZODone (DESYREL)  mg, Nightly PRN       Review of patient's allergies indicates:   Allergen Reactions    Macrolide antibiotics Palpitations    Nitrofurantoin Palpitations        Patient Care Team:  Lauren Watson, NP as PCP - General (Nurse Practitioner)     Review of Systems:    Constitution:   Denies chills, fever, and sweats.  HENT:   Denies headaches or blurry vision.  Cardiovascular:   Denies chest pain or irregular heart beat.  Respiratory:   Denies cough or shortness of breath.  Gastrointestinal:  Denies abdominal pain, nausea, or vomiting.  Musculoskeletal:   Denies muscle cramps.  Neurological:   Denies dizziness or focal weakness.  Psychiatric/Behavior: Normal mental status.  Hematology/Lymph:  Denies bleeding problem or easy bruising/bleeding.  Skin:    Denies rash or suspicious lesions.    Physical Examination:    Vital Signs:    Vitals:    05/19/25 0939   BP: 127/79   Pulse: 73   Weight: 89.5 kg (197 lb 5 oz)   Height: 5' 2" (1.575 m)   Body mass index is 36.09 kg/m².    Constitution:   Well-developed, well nourished patient in no acute distress.  Neurological:   Alert and oriented x 3 and cooperative to examination.     Psychiatric/Behavior: Normal mental status.  Respiratory:   No shortness of breath. Non-labored breathing.  Eyes:    Extraoccular muscles intact.    Musculoskeletal Examination:   left Upper Extremity:  Surgical incision is healing well with absorbable sutures in place no evidence of infection. Steri strips in place. Finkelstein test is negative. She can make a full fist and extend digits fully.  Wrist motion is supple but appropriately stiff from immobilization. Radial pulses 2+ hand is warm well perfused.    Imaging:   No new imaging.     Assessment:  Post left DeQuervain's release    Plan:  Patient is doing well.  "  Patient was counseled on scar massage.  Start easing into activity as tolerated. Velcro Thumb spica brace provided today in clinic. She can continue wearing the brace as needed for pain however she may discontinue this once she no longer has pain.      Follow Up: 4-6 weeks    X-Ray at Next Visit: None.

## 2025-06-05 ENCOUNTER — HOSPITAL ENCOUNTER (OUTPATIENT)
Dept: RADIOLOGY | Facility: HOSPITAL | Age: 50
Discharge: HOME | End: 2025-06-05
Attending: NURSE PRACTITIONER
Payer: MEDICARE

## 2025-06-05 DIAGNOSIS — Z12.31 VISIT FOR SCREENING MAMMOGRAM: ICD-10-CM

## 2025-06-05 PROCEDURE — 77067 SCR MAMMO BI INCL CAD: CPT | Mod: TC

## 2025-06-06 ENCOUNTER — OFFICE VISIT (OUTPATIENT)
Dept: GYNECOLOGY | Facility: CLINIC | Age: 50
End: 2025-06-06
Payer: MEDICARE

## 2025-06-06 ENCOUNTER — LAB VISIT (OUTPATIENT)
Dept: LAB | Facility: HOSPITAL | Age: 50
End: 2025-06-06
Attending: NURSE PRACTITIONER
Payer: MEDICARE

## 2025-06-06 VITALS
TEMPERATURE: 99 F | HEIGHT: 62 IN | BODY MASS INDEX: 36.29 KG/M2 | SYSTOLIC BLOOD PRESSURE: 133 MMHG | RESPIRATION RATE: 20 BRPM | HEART RATE: 83 BPM | DIASTOLIC BLOOD PRESSURE: 83 MMHG | WEIGHT: 197.19 LBS | OXYGEN SATURATION: 100 %

## 2025-06-06 DIAGNOSIS — Z01.419 ENCOUNTER FOR ANNUAL ROUTINE GYNECOLOGICAL EXAMINATION: Primary | ICD-10-CM

## 2025-06-06 DIAGNOSIS — Z11.3 ROUTINE SCREENING FOR STI (SEXUALLY TRANSMITTED INFECTION): ICD-10-CM

## 2025-06-06 LAB
C TRACH DNA SPEC QL NAA+PROBE: NOT DETECTED
CLUE CELLS VAG QL WET PREP: NORMAL
HBV SURFACE AG SERPL QL IA: NONREACTIVE
HCV AB SERPL QL IA: NONREACTIVE
HIV 1+2 AB+HIV1 P24 AG SERPL QL IA: NONREACTIVE
N GONORRHOEA DNA SPEC QL NAA+PROBE: NOT DETECTED
SPECIMEN SOURCE: NORMAL
T PALLIDUM AB SER QL: NONREACTIVE
T VAGINALIS VAG QL WET PREP: NORMAL
WBC #/AREA VAG WET PREP: NORMAL
YEAST SPEC QL WET PREP: NORMAL

## 2025-06-06 PROCEDURE — 87340 HEPATITIS B SURFACE AG IA: CPT

## 2025-06-06 PROCEDURE — 99214 OFFICE O/P EST MOD 30 MIN: CPT | Mod: PBBFAC | Performed by: NURSE PRACTITIONER

## 2025-06-06 PROCEDURE — 86803 HEPATITIS C AB TEST: CPT

## 2025-06-06 PROCEDURE — 87491 CHLMYD TRACH DNA AMP PROBE: CPT | Performed by: NURSE PRACTITIONER

## 2025-06-06 PROCEDURE — 86780 TREPONEMA PALLIDUM: CPT

## 2025-06-06 PROCEDURE — 87389 HIV-1 AG W/HIV-1&-2 AB AG IA: CPT

## 2025-06-06 PROCEDURE — 87210 SMEAR WET MOUNT SALINE/INK: CPT | Performed by: NURSE PRACTITIONER

## 2025-06-06 PROCEDURE — 36415 COLL VENOUS BLD VENIPUNCTURE: CPT

## 2025-06-30 ENCOUNTER — OFFICE VISIT (OUTPATIENT)
Dept: ORTHOPEDICS | Facility: CLINIC | Age: 50
End: 2025-06-30
Payer: MEDICARE

## 2025-06-30 VITALS
DIASTOLIC BLOOD PRESSURE: 88 MMHG | HEIGHT: 62 IN | WEIGHT: 197 LBS | HEART RATE: 76 BPM | BODY MASS INDEX: 36.25 KG/M2 | SYSTOLIC BLOOD PRESSURE: 137 MMHG

## 2025-06-30 DIAGNOSIS — M65.4 DE QUERVAIN'S TENOSYNOVITIS, LEFT: Primary | ICD-10-CM

## 2025-06-30 PROCEDURE — 99024 POSTOP FOLLOW-UP VISIT: CPT | Mod: ,,,

## 2025-06-30 PROCEDURE — 3075F SYST BP GE 130 - 139MM HG: CPT | Mod: CPTII,,,

## 2025-06-30 PROCEDURE — 3079F DIAST BP 80-89 MM HG: CPT | Mod: CPTII,,,

## 2025-06-30 PROCEDURE — 1160F RVW MEDS BY RX/DR IN RCRD: CPT | Mod: CPTII,,,

## 2025-06-30 PROCEDURE — 1159F MED LIST DOCD IN RCRD: CPT | Mod: CPTII,,,

## 2025-06-30 NOTE — PROGRESS NOTES
Orthopedic Clinic    Chief Complaint: Post Op Visit    Consulting Physician: No ref. provider found    left 1st dorsal compartment release on 25    History of Present Illness: Beverly Lee is a 49 y.o. female here today for post op DeQuervain's visit. Patient is doing well. Pain over the radial wrist has improved. No numbness or tingling. She states she wears her brace with activity.       Past Medical History:   Diagnosis Date    Abnormal Pap smear of cervix     Acute cystitis with hematuria 2023    ADHD     Anxiety and depression     Arthritis     BMI 36.0-36.9,adult 2023    Bowel incontinence     Breast pain, left 2024    Carpal tunnel syndrome, right     Gastritis     Hypertension     PONV (postoperative nausea and vomiting)     Respiratory distress  with anesthesia     Seasonal allergies     Vaginal fistula         Past Surgical History:   Procedure Laterality Date    BILATERAL TUBAL LIGATION      carpal tunnel released Right      SECTION      x2    CHOLECYSTECTOMY      COLONOSCOPY, WITH POLYPECTOMY USING HOT BIOPSY FORCEPS N/A 2024    Procedure: COLONOSCOPY, WITH POLYPECTOMY USING HOT BIOPSY FORCEPS;  Surgeon: SUKUMAR Quinones MD;  Location: [facility] ENDOSCOPY;  Service: Gastroenterology;  Laterality: N/A;  Cold forceps    COLONOSCOPY, WITH POLYPECTOMY USING SNARE N/A 2024    Procedure: COLONOSCOPY, WITH POLYPECTOMY USING SNARE;  Surgeon: SUKUMAR Quinones MD;  Location: [facility] ENDOSCOPY;  Service: Gastroenterology;  Laterality: N/A;    DE QUERVAIN'S RELEASE Left 2025    Procedure: RELEASE, HAND, FOR DEQUERVAIN'S TENOSYNOVITIS;  Surgeon: Fab Esquivel MD;  Location: [facility];  Service: Orthopedics;  Laterality: Left;    gastric sleeve  2022    PENECTOMY, TOTAL, RADICAL, WITH LYMPHADENECTOMY      RECTAL SURGERY      REDUCTION OF BOTH BREASTS Bilateral 2024    Procedure: MAMMOPLASTY, REDUCTION, BILATERAL (bilateral breast reduction with free nipple  grafts);  Surgeon: Jhon Lal MD;  Location: [facility] OR;  Service: Plastics;  Laterality: Bilateral;  468 grams removed from right breast, 412 grams removed from left breast    REPAIR OF ACHILLES TENDON Right     STIMULATOR PLACEMENT AND REMOVAL      STATES TO STIMULATE BOWELS    TONSILLECTOMY      VAGINA SURGERY      vaginal fistula    WISDOM TOOTH EXTRACTION          Social History     Tobacco Use    Smoking status: Never     Passive exposure: Never    Smokeless tobacco: Never   Substance and Sexual Activity    Alcohol use: Yes     Comment: rarely    Drug use: Yes     Types: Marijuana     Comment: last use 3 weeks ago    Sexual activity: Yes     Partners: Male        Current Outpatient Medications   Medication Instructions    aluminum & magnesium hydroxide-simethicone (MYLANTA MAX STRENGTH) 400-400-40 mg/5 mL suspension 5 mLs, Every 6 hours PRN    calcium-vitamin D3 (OS-FIGUEROA 500 + D3) 500 mg-5 mcg (200 unit) per tablet 1 tablet, Daily    cetirizine (ZYRTEC) 10 mg, Daily PRN    cholestyramine (QUESTRAN) 4 gram packet 4 g, Oral, 3 times daily with meals    clindamycin (CLEOCIN T) 1 % Swab     clotrimazole (LOTRIMIN) 1 % cream Topical (Top), 2 times daily    dextroamphetamine-amphetamine (ADDERALL XR) 30 MG 24 hr capsule 30 mg, Every morning    ferrous gluconate (FERGON) 324 mg, Oral, Every other day    finasteride (PROSCAR) 5 mg, Daily    fluticasone propionate (FLONASE) 100 mcg, Each Nostril, 2 times daily, prn    ibuprofen (ADVIL,MOTRIN) 800 mg, Oral, 3 times daily    LIDOcaine-diphenhyd-Al-mag-sim (FIRST-MOUTHWASH BLM) -493-40 mg/30mL mouthwash suspension 10 mLs, Swish & Spit, 3 times daily    LINZESS 145 mcg, As needed (PRN)    minoxidiL (LONITEN) 1.25 mg, Daily    multivitamin (ONE DAILY MULTIVITAMIN) per tablet 1 tablet, Daily    nystatin-triamcinolone (MYCOLOG II) cream 2 times daily PRN    omega-3 fatty acids/fish oil (FISH OIL-OMEGA-3 FATTY ACIDS) 300-1,000 mg capsule 1 capsule, Daily    omeprazole  "(PRILOSEC) 20 mg, As needed (PRN)    ondansetron (ZOFRAN-ODT) 4 mg, Every 8 hours PRN    PaxiL 30 mg, Every morning    traZODone (DESYREL)  mg, Nightly PRN       Review of patient's allergies indicates:   Allergen Reactions    Macrolide antibiotics Palpitations    Nitrofurantoin Palpitations        Patient Care Team:  Lauren Watson, NP as PCP - General (Nurse Practitioner)     Review of Systems:    Constitution:   Denies chills, fever, and sweats.  HENT:   Denies headaches or blurry vision.  Cardiovascular:   Denies chest pain or irregular heart beat.  Respiratory:   Denies cough or shortness of breath.  Gastrointestinal:  Denies abdominal pain, nausea, or vomiting.  Musculoskeletal:   Denies muscle cramps.  Neurological:   Denies dizziness or focal weakness.  Psychiatric/Behavior: Normal mental status.  Hematology/Lymph:  Denies bleeding problem or easy bruising/bleeding.  Skin:    Denies rash or suspicious lesions.    Physical Examination:    Vital Signs:    Vitals:    06/30/25 0955   BP: 137/88   Pulse: 76   Weight: 89.4 kg (197 lb)   Height: 5' 2" (1.575 m)     Body mass index is 36.03 kg/m².    Constitution:   Well-developed, well nourished patient in no acute distress.  Neurological:   Alert and oriented x 3 and cooperative to examination.     Psychiatric/Behavior: Normal mental status.  Respiratory:   No shortness of breath. Non-labored breathing.  Eyes:    Extraoccular muscles intact.    Musculoskeletal Examination:   left Upper Extremity:  Surgical incision is healing well. Finkelstein test is negative. She can make a full fist and extend digits fully. Radial pulses 2+ hand is warm well perfused.    Imaging:   No new imaging.     Assessment:  Post left DeQuervain's release    Plan:  Patient is doing well.   Continue scar massages. Activity as tolerated. She can continue wearing the brace as needed for pain however she may discontinue this once she no longer has pain.  Follow up as needed    Follow " Up: As needed    X-Ray at Next Visit: None.

## 2025-07-29 ENCOUNTER — HOSPITAL ENCOUNTER (OUTPATIENT)
Dept: RADIOLOGY | Facility: CLINIC | Age: 50
Discharge: HOME | End: 2025-07-29
Attending: ORTHOPAEDIC SURGERY
Payer: MEDICARE

## 2025-07-29 ENCOUNTER — OFFICE VISIT (OUTPATIENT)
Dept: ORTHOPEDICS | Facility: CLINIC | Age: 50
End: 2025-07-29
Payer: MEDICARE

## 2025-07-29 VITALS
BODY MASS INDEX: 36.62 KG/M2 | WEIGHT: 199 LBS | HEIGHT: 62 IN | DIASTOLIC BLOOD PRESSURE: 85 MMHG | SYSTOLIC BLOOD PRESSURE: 129 MMHG

## 2025-07-29 DIAGNOSIS — M17.11 PRIMARY OSTEOARTHRITIS OF RIGHT KNEE: Primary | ICD-10-CM

## 2025-07-29 DIAGNOSIS — M17.11 PRIMARY OSTEOARTHRITIS OF RIGHT KNEE: ICD-10-CM

## 2025-07-29 PROCEDURE — 73564 X-RAY EXAM KNEE 4 OR MORE: CPT | Mod: RT,,, | Performed by: ORTHOPAEDIC SURGERY

## 2025-07-29 NOTE — PROGRESS NOTES
Subjective:      Patient ID: Beverly Lee is a 49 y.o. female.    Chief Complaint: Follow-up of the Right Knee and Follow-up (Patient states it has been good, having some aches now and then. Pain returned about 1 month ago, describes as aching and stiffness. Denies any numbness or tingling. Taking ibuprofen for pain with relief.  Interested in more Synvisc inj )    HPI:     History of Present Illness    CHIEF COMPLAINT:  - Right knee arthritis follow-up and Synvisc injection.    HPI:  Beverly presents today for right knee arthritis and requests another Synvisc injection. Her right knee has more narrowing compared to the left, though the left knee may potentially become symptomatic in the future. She reports a history of two surgeries: removal of a cyst and a lipoma on the back, performed by Dr. Love at [facility]. She also mentions having gastric sleeve surgery three years ago, with successful weight maintenance since then. She reports engaging in regular workouts and weight training to maintain overall health and improve firmness.    She reports no fluid behind the kneecap.    PREVIOUS TREATMENTS:  - Synvisc injection: Right knee, approximately 3 months ago    IMAGING:  - XR Bilateral Knees: Degenerative changes. Right knee shows more narrowing of the joint space compared to the left knee. No significant changes from previous XRs. No fluid noted behind the kneecap.    SURGICAL HISTORY:  - Cyst removal  - Lipoma removal: Back  - Gastric sleeve surgery: 3 years ago    WORK STATUS:  - Previously worked as an XR technician    SOCIAL HISTORY:  -   -  frequently travels for work      ROS:  Musculoskeletal: +joint pain, +limb pain, +pain with movement          Past Medical History:   Diagnosis Date    Abnormal Pap smear of cervix     Acute cystitis with hematuria 07/26/2023    ADHD     Anxiety and depression     Arthritis     BMI 36.0-36.9,adult 07/26/2023    Bowel incontinence     Breast pain, left 04/22/2024  "   Carpal tunnel syndrome, right     Gastritis     Hypertension     PONV (postoperative nausea and vomiting)     Respiratory distress  with anesthesia     Seasonal allergies     Vaginal fistula      Past Surgical History:   Procedure Laterality Date    BILATERAL TUBAL LIGATION      carpal tunnel released Right      SECTION      x2    CHOLECYSTECTOMY      COLONOSCOPY, WITH POLYPECTOMY USING HOT BIOPSY FORCEPS N/A 2024    Procedure: COLONOSCOPY, WITH POLYPECTOMY USING HOT BIOPSY FORCEPS;  Surgeon: SUKUMAR Quinones MD;  Location: [facility] ENDOSCOPY;  Service: Gastroenterology;  Laterality: N/A;  Cold forceps    COLONOSCOPY, WITH POLYPECTOMY USING SNARE N/A 2024    Procedure: COLONOSCOPY, WITH POLYPECTOMY USING SNARE;  Surgeon: SUKUMAR Quinones MD;  Location: [facility] ENDOSCOPY;  Service: Gastroenterology;  Laterality: N/A;    DE QUERVAIN'S RELEASE Left 2025    Procedure: RELEASE, HAND, FOR DEQUERVAIN'S TENOSYNOVITIS;  Surgeon: Fab Esquivel MD;  Location: [facility] OR;  Service: Orthopedics;  Laterality: Left;    gastric sleeve  2022    limpoma Bilateral 2025    PENECTOMY, TOTAL, RADICAL, WITH LYMPHADENECTOMY      RECTAL SURGERY      REDUCTION OF BOTH BREASTS Bilateral 2024    Procedure: MAMMOPLASTY, REDUCTION, BILATERAL (bilateral breast reduction with free nipple grafts);  Surgeon: Jhon Lal MD;  Location: [facility];  Service: Plastics;  Laterality: Bilateral;  468 grams removed from right breast, 412 grams removed from left breast    REPAIR OF ACHILLES TENDON Right     STIMULATOR PLACEMENT AND REMOVAL      STATES TO STIMULATE BOWELS    TONSILLECTOMY      VAGINA SURGERY      vaginal fistula    WISDOM TOOTH EXTRACTION       Social History[1]    Current Medications[2]  Review of patient's allergies indicates:   Allergen Reactions    Macrolide antibiotics Palpitations    Nitrofurantoin Palpitations       /85 (BP Location: Right arm, Patient Position: Sitting)   Ht 5' 2" " (1.575 m)   Wt 90.3 kg (199 lb)   BMI 36.40 kg/m²     Comprehensive review of systems completed and negative except as per HPI.        Objective:   General: Well-developed, well-nourished.  Neuro: Alert and oriented x 3.  Psych: Normal mood and affect.  Card: Regular rate and rhythm  Resp: Respirations regular and unlabored    Knee Exam:  Varus deformity. Range of motion from 0-120 degrees. Negative patella grind and equal subluxation of knee cap medial and lateral < 1cm. Negative patella tendon tenderness. Negative Lachman and anterior drawer test. Negative posterior drawer test. Negative varus and valgus stress test. Positive medial joint line tenderness. Negative lateral joint line tenderness. 4/5 strength and normal skin appearance. Sensibility normal.        Assessment:         1. Primary osteoarthritis of right knee          Plan:       Orders Placed This Encounter    X-Ray Knee Complete 4 Or More Views Right    Prior authorization Order        Imaging and exam findings discussed.     Assessment & Plan    PROCEDURES:  - # Procedures  - Recommend and ordered right knee Synvisc injection.  - Beverly agreed to right knee Synvisc injection.  She will come back for this once we get it approved.    FOLLOW UP:  - Follow up to continue sequence of injections.    PATIENT INSTRUCTIONS:  - Continue weight training exercises to maintain bone density and overall health.               All questions were answered. Patient happy and in agreement with the plan.     This note was generated with the assistance of ambient listening technology. Verbal consent was obtained by the patient and accompanying visitor(s) for the recording of patient appointment to facilitate this note. I attest to having reviewed and edited the generated note for accuracy, though some syntax or spelling errors may persist. Please contact the author of this note for any clarification.         [1]   Social History  Socioeconomic History    Marital status:  Single    Number of children: 3   Tobacco Use    Smoking status: Never     Passive exposure: Never    Smokeless tobacco: Never   Substance and Sexual Activity    Alcohol use: Yes     Comment: rarely    Drug use: Yes     Types: Marijuana     Comment: last use 3 weeks ago    Sexual activity: Yes     Partners: Male   Social History Narrative    ** Merged History Encounter **          Social Drivers of Health     Financial Resource Strain: High Risk (9/17/2024)    Received from [facility] SDOH Screening     In the past year, have you or any family members you live with been unable to get any of the following when you really needed them? choose all that apply.: Smartphone/tablet     In the past year, have you or any family members you live with been unable to get any of the following when you really needed them? choose all that apply.: Internet   Food Insecurity: High Risk (9/24/2024)    Received from [facility] SDOH Screening     In the past 2 months, did you or others you live with eat smaller meals or skip meals because you didn't have money for food?: Yes   Transportation Needs: No Transportation Needs (8/7/2024)    TRANSPORTATION NEEDS     Transportation : No   Physical Activity: Sufficiently Active (8/7/2024)    Exercise Vital Sign     Days of Exercise per Week: 3 days     Minutes of Exercise per Session: 50 min   Stress: Stress Concern Present (8/7/2024)    [employer] - Occupational Stress Questionnaire     Feeling of Stress : Very much   Housing Stability: Unknown (9/4/2024)    Housing Stability Vital Sign     Unable to Pay for Housing in the Last Year: No     Homeless in the Last Year: No   [2]   Current Outpatient Medications:     calcium-vitamin D3 (OS-FIGUEROA 500 + D3) 500 mg-5 mcg (200 unit) per tablet, Take 1 tablet by mouth once daily., Disp: , Rfl:     cetirizine (ZYRTEC) 10 MG tablet, Take 10 mg by mouth daily as needed., Disp: , Rfl:     cholestyramine  (QUESTRAN) 4 gram packet, Take 1 packet (4 g total) by mouth 3 (three) times daily with meals., Disp: 270 packet, Rfl: 12    clindamycin (CLEOCIN T) 1 % Swab, , Disp: , Rfl:     clotrimazole (LOTRIMIN) 1 % cream, Apply topically 2 (two) times daily., Disp: 60 g, Rfl: 1    dextroamphetamine-amphetamine (ADDERALL XR) 30 MG 24 hr capsule, Take 30 mg by mouth every morning., Disp: , Rfl:     ferrous gluconate (FERGON) 324 MG tablet, Take 1 tablet (324 mg total) by mouth every other day., Disp: 45 tablet, Rfl: 3    fluticasone propionate (FLONASE) 50 mcg/actuation nasal spray, 2 sprays (100 mcg total) by Each Nostril route 2 (two) times a day. prn, Disp: 16 g, Rfl: 11    ibuprofen (ADVIL,MOTRIN) 800 MG tablet, Take 1 tablet (800 mg total) by mouth 3 (three) times daily., Disp: 21 tablet, Rfl: 0    LIDOcaine-diphenhyd-Al-mag-sim (FIRST-MOUTHWASH BLM) -598-40 mg/30mL mouthwash suspension, Swish and spit 10 mLs 3 (three) times daily., Disp: 90 mL, Rfl: 0    LINZESS 145 mcg Cap capsule, Take 145 mcg by mouth as needed., Disp: , Rfl:     multivitamin (ONE DAILY MULTIVITAMIN) per tablet, Take 1 tablet by mouth once daily., Disp: , Rfl:     nystatin-triamcinolone (MYCOLOG II) cream, Apply topically 2 (two) times daily as needed., Disp: , Rfl:     omega-3 fatty acids/fish oil (FISH OIL-OMEGA-3 FATTY ACIDS) 300-1,000 mg capsule, Take 1 capsule by mouth once daily., Disp: , Rfl:     omeprazole (PRILOSEC) 20 MG capsule, Take 20 mg by mouth as needed., Disp: , Rfl:     ondansetron (ZOFRAN-ODT) 4 MG TbDL, Take 4 mg by mouth every 8 (eight) hours as needed., Disp: , Rfl:     PAXIL 30 mg tablet, Take 30 mg by mouth every morning., Disp: , Rfl:     traZODone (DESYREL) 50 MG tablet, Take  mg by mouth nightly as needed., Disp: , Rfl:     aluminum & magnesium hydroxide-simethicone (MYLANTA MAX STRENGTH) 400-400-40 mg/5 mL suspension, Take 5 mLs by mouth every 6 (six) hours as needed., Disp: , Rfl:     finasteride (PROSCAR) 5 mg  tablet, Take 5 mg by mouth once daily., Disp: , Rfl:     minoxidiL (LONITEN) 2.5 MG tablet, Take 1.25 mg by mouth once daily., Disp: , Rfl:

## 2025-08-12 ENCOUNTER — OFFICE VISIT (OUTPATIENT)
Dept: ORTHOPEDICS | Facility: CLINIC | Age: 50
End: 2025-08-12
Payer: MEDICARE

## 2025-08-12 VITALS
HEART RATE: 87 BPM | BODY MASS INDEX: 36.62 KG/M2 | DIASTOLIC BLOOD PRESSURE: 84 MMHG | HEIGHT: 62 IN | WEIGHT: 199 LBS | SYSTOLIC BLOOD PRESSURE: 135 MMHG

## 2025-08-12 DIAGNOSIS — M17.11 PRIMARY OSTEOARTHRITIS OF RIGHT KNEE: Primary | ICD-10-CM

## 2025-08-12 RX ORDER — LIDOCAINE HYDROCHLORIDE 20 MG/ML
3 INJECTION, SOLUTION INFILTRATION; PERINEURAL
Status: DISCONTINUED | OUTPATIENT
Start: 2025-08-12 | End: 2025-08-12 | Stop reason: HOSPADM

## 2025-08-12 RX ADMIN — LIDOCAINE HYDROCHLORIDE 3 ML: 20 INJECTION, SOLUTION INFILTRATION; PERINEURAL at 01:08

## 2025-08-20 ENCOUNTER — OFFICE VISIT (OUTPATIENT)
Dept: INTERNAL MEDICINE | Facility: CLINIC | Age: 50
End: 2025-08-20
Payer: MEDICARE

## 2025-08-20 ENCOUNTER — LAB VISIT (OUTPATIENT)
Dept: LAB | Facility: HOSPITAL | Age: 50
End: 2025-08-20
Attending: NURSE PRACTITIONER
Payer: MEDICARE

## 2025-08-20 VITALS
SYSTOLIC BLOOD PRESSURE: 126 MMHG | BODY MASS INDEX: 37.11 KG/M2 | OXYGEN SATURATION: 100 % | RESPIRATION RATE: 20 BRPM | HEIGHT: 62 IN | HEART RATE: 71 BPM | WEIGHT: 201.69 LBS | DIASTOLIC BLOOD PRESSURE: 83 MMHG

## 2025-08-20 DIAGNOSIS — Z00.00 WELLNESS EXAMINATION: ICD-10-CM

## 2025-08-20 DIAGNOSIS — Z00.00 WELLNESS EXAMINATION: Primary | ICD-10-CM

## 2025-08-20 DIAGNOSIS — I10 PRIMARY HYPERTENSION: ICD-10-CM

## 2025-08-20 DIAGNOSIS — Z98.84 S/P BARIATRIC SURGERY: ICD-10-CM

## 2025-08-20 DIAGNOSIS — Z13.1 SCREENING FOR DIABETES MELLITUS: ICD-10-CM

## 2025-08-20 DIAGNOSIS — D50.9 IRON DEFICIENCY ANEMIA, UNSPECIFIED IRON DEFICIENCY ANEMIA TYPE: ICD-10-CM

## 2025-08-20 DIAGNOSIS — D73.4 SPLENIC CYST: ICD-10-CM

## 2025-08-20 DIAGNOSIS — R14.0 ABDOMINAL BLOATING: ICD-10-CM

## 2025-08-20 DIAGNOSIS — E66.9 OBESITY (BMI 35.0-39.9 WITHOUT COMORBIDITY): ICD-10-CM

## 2025-08-20 DIAGNOSIS — K76.0 HEPATIC STEATOSIS: ICD-10-CM

## 2025-08-20 DIAGNOSIS — K44.9 HIATAL HERNIA: ICD-10-CM

## 2025-08-20 DIAGNOSIS — E53.8 DEFICIENCY OF OTHER SPECIFIED B GROUP VITAMINS: ICD-10-CM

## 2025-08-20 DIAGNOSIS — R19.7 DIARRHEA, UNSPECIFIED TYPE: ICD-10-CM

## 2025-08-20 LAB
25(OH)D3+25(OH)D2 SERPL-MCNC: 38 NG/ML (ref 30–80)
ALBUMIN SERPL-MCNC: 3.5 G/DL (ref 3.5–5)
ALBUMIN/CREAT UR: 4.7 MG/GM CR (ref 0–30)
ALBUMIN/GLOB SERPL: 0.9 RATIO (ref 1.1–2)
ALP SERPL-CCNC: 79 UNIT/L (ref 40–150)
ALT SERPL-CCNC: 10 UNIT/L (ref 0–55)
ANION GAP SERPL CALC-SCNC: 6 MEQ/L
AST SERPL-CCNC: 17 UNIT/L (ref 11–45)
BACTERIA #/AREA URNS AUTO: ABNORMAL /HPF
BASOPHILS # BLD AUTO: 0.02 X10(3)/MCL
BASOPHILS NFR BLD AUTO: 0.2 %
BILIRUB SERPL-MCNC: 0.2 MG/DL
BILIRUB UR QL STRIP.AUTO: NEGATIVE
BUN SERPL-MCNC: 13.3 MG/DL (ref 7–18.7)
CALCIUM SERPL-MCNC: 9 MG/DL (ref 8.4–10.2)
CHLORIDE SERPL-SCNC: 109 MMOL/L (ref 98–107)
CHOLEST SERPL-MCNC: 172 MG/DL
CHOLEST/HDLC SERPL: 3 {RATIO} (ref 0–5)
CLARITY UR: CLEAR
CO2 SERPL-SCNC: 26 MMOL/L (ref 22–29)
COLOR UR AUTO: YELLOW
CREAT SERPL-MCNC: 0.89 MG/DL (ref 0.55–1.02)
CREAT UR-MCNC: 145.8 MG/DL (ref 45–106)
CREAT/UREA NIT SERPL: 15
EOSINOPHIL # BLD AUTO: 0.27 X10(3)/MCL (ref 0–0.9)
EOSINOPHIL NFR BLD AUTO: 3.1 %
ERYTHROCYTE [DISTWIDTH] IN BLOOD BY AUTOMATED COUNT: 12.8 % (ref 11.5–17)
EST. AVERAGE GLUCOSE BLD GHB EST-MCNC: 93.9 MG/DL
FERRITIN SERPL-MCNC: 8.04 NG/ML (ref 4.63–204)
GFR SERPLBLD CREATININE-BSD FMLA CKD-EPI: >60 ML/MIN/1.73/M2
GLOBULIN SER-MCNC: 3.7 GM/DL (ref 2.4–3.5)
GLUCOSE SERPL-MCNC: 93 MG/DL (ref 74–100)
GLUCOSE UR QL STRIP: NORMAL
HBA1C MFR BLD: 4.9 %
HCT VFR BLD AUTO: 35 % (ref 37–47)
HDLC SERPL-MCNC: 65 MG/DL (ref 35–60)
HGB BLD-MCNC: 11.2 G/DL (ref 12–16)
HGB UR QL STRIP: NEGATIVE
HYALINE CASTS #/AREA URNS LPF: ABNORMAL /LPF
IMM GRANULOCYTES # BLD AUTO: 0.02 X10(3)/MCL (ref 0–0.04)
IMM GRANULOCYTES NFR BLD AUTO: 0.2 %
IRON SATN MFR SERPL: 7 % (ref 20–50)
IRON SERPL-MCNC: 20 UG/DL (ref 50–170)
KETONES UR QL STRIP: NEGATIVE
LDLC SERPL CALC-MCNC: 96 MG/DL (ref 50–140)
LEUKOCYTE ESTERASE UR QL STRIP: NEGATIVE
LYMPHOCYTES # BLD AUTO: 1.92 X10(3)/MCL (ref 0.6–4.6)
LYMPHOCYTES NFR BLD AUTO: 22.2 %
MCH RBC QN AUTO: 29.4 PG (ref 27–31)
MCHC RBC AUTO-ENTMCNC: 32 G/DL (ref 33–36)
MCV RBC AUTO: 91.9 FL (ref 80–94)
MICROALBUMIN UR-MCNC: 6.8 UG/ML
MONOCYTES # BLD AUTO: 0.52 X10(3)/MCL (ref 0.1–1.3)
MONOCYTES NFR BLD AUTO: 6 %
MUCOUS THREADS URNS QL MICRO: ABNORMAL /LPF
NEUTROPHILS # BLD AUTO: 5.88 X10(3)/MCL (ref 2.1–9.2)
NEUTROPHILS NFR BLD AUTO: 68.3 %
NITRITE UR QL STRIP: NEGATIVE
NRBC BLD AUTO-RTO: 0 %
PH UR STRIP: 7 [PH]
PLATELET # BLD AUTO: 217 X10(3)/MCL (ref 130–400)
PMV BLD AUTO: 10.3 FL (ref 7.4–10.4)
POTASSIUM SERPL-SCNC: 4 MMOL/L (ref 3.5–5.1)
PROT SERPL-MCNC: 7.2 GM/DL (ref 6.4–8.3)
PROT UR QL STRIP: NEGATIVE
RBC # BLD AUTO: 3.81 X10(6)/MCL (ref 4.2–5.4)
RBC #/AREA URNS AUTO: ABNORMAL /HPF
SODIUM SERPL-SCNC: 141 MMOL/L (ref 136–145)
SP GR UR STRIP.AUTO: 1.03 (ref 1–1.03)
SQUAMOUS #/AREA URNS LPF: ABNORMAL /HPF
TIBC SERPL-MCNC: 284 UG/DL (ref 70–310)
TIBC SERPL-MCNC: 304 UG/DL (ref 250–450)
TRANSFERRIN SERPL-MCNC: 278 MG/DL (ref 180–382)
TRIGL SERPL-MCNC: 53 MG/DL (ref 37–140)
UROBILINOGEN UR STRIP-ACNC: NORMAL
VIT B12 SERPL-MCNC: 256 PG/ML (ref 213–816)
VLDLC SERPL CALC-MCNC: 11 MG/DL
WBC # BLD AUTO: 8.63 X10(3)/MCL (ref 4.5–11.5)
WBC #/AREA URNS AUTO: ABNORMAL /HPF

## 2025-08-20 PROCEDURE — 82043 UR ALBUMIN QUANTITATIVE: CPT

## 2025-08-20 PROCEDURE — 81015 MICROSCOPIC EXAM OF URINE: CPT

## 2025-08-20 PROCEDURE — 80053 COMPREHEN METABOLIC PANEL: CPT

## 2025-08-20 PROCEDURE — 82607 VITAMIN B-12: CPT

## 2025-08-20 PROCEDURE — 83036 HEMOGLOBIN GLYCOSYLATED A1C: CPT

## 2025-08-20 PROCEDURE — 80061 LIPID PANEL: CPT

## 2025-08-20 PROCEDURE — 82728 ASSAY OF FERRITIN: CPT

## 2025-08-20 PROCEDURE — 99215 OFFICE O/P EST HI 40 MIN: CPT | Mod: PBBFAC | Performed by: NURSE PRACTITIONER

## 2025-08-20 PROCEDURE — 82306 VITAMIN D 25 HYDROXY: CPT

## 2025-08-20 PROCEDURE — 83550 IRON BINDING TEST: CPT

## 2025-08-20 PROCEDURE — 85025 COMPLETE CBC W/AUTO DIFF WBC: CPT

## 2025-08-20 PROCEDURE — 36415 COLL VENOUS BLD VENIPUNCTURE: CPT

## 2025-08-20 RX ORDER — FERROUS GLUCONATE 324(38)MG
324 TABLET ORAL EVERY OTHER DAY
Qty: 45 TABLET | Refills: 3 | Status: SHIPPED | OUTPATIENT
Start: 2025-08-20

## 2025-08-20 RX ORDER — TIZANIDINE 4 MG/1
TABLET ORAL
COMMUNITY
Start: 2025-08-09

## 2025-08-22 ENCOUNTER — HOSPITAL ENCOUNTER (OUTPATIENT)
Dept: RADIOLOGY | Facility: HOSPITAL | Age: 50
Discharge: HOME | End: 2025-08-22
Attending: NURSE PRACTITIONER
Payer: MEDICARE

## 2025-08-22 DIAGNOSIS — R14.0 ABDOMINAL BLOATING: ICD-10-CM

## 2025-08-22 PROCEDURE — 76700 US EXAM ABDOM COMPLETE: CPT | Mod: TC

## 2025-08-25 ENCOUNTER — TELEPHONE (OUTPATIENT)
Dept: INTERNAL MEDICINE | Facility: CLINIC | Age: 50
End: 2025-08-25
Payer: MEDICARE

## (undated) DEVICE — KIT SURGICAL COLON .25 1.1OZ

## (undated) DEVICE — Device

## (undated) DEVICE — GLOVE PROTEXIS BLUE LATEX 8.5

## (undated) DEVICE — GLOVE SENSICARE PI GRN 6.5

## (undated) DEVICE — APPLICATOR CHLORAPREP ORN 26ML

## (undated) DEVICE — SNARE EXACTO COLD

## (undated) DEVICE — SPONGE COTTON TRAY 4X4IN

## (undated) DEVICE — CUFF ATS 2 PORT SNGL BLDR 18IN

## (undated) DEVICE — GAUZE SPONGE BULKEE 6X6.75IN

## (undated) DEVICE — SUT MONOCRYL 2-0 S UND

## (undated) DEVICE — BANDAGE GAUZE COT STRL 4.5X4.1

## (undated) DEVICE — SOL NACL IRR 1000ML BTL

## (undated) DEVICE — SUT VICRYL PLUS 0 CT1 18IN

## (undated) DEVICE — FORCEP ALLIGATOR 2.8MM W/NDL

## (undated) DEVICE — BLADE SURG STAINLESS STEEL #10

## (undated) DEVICE — PAD PREP CUFFED NS 24X48IN

## (undated) DEVICE — STAPLER SKIN PROXIMATE WIDE

## (undated) DEVICE — SUT STRATAFIX MCRYL 27IN 2-0

## (undated) DEVICE — GLOVE SENSICARE PI SURG 6.5

## (undated) DEVICE — SUT CTD VICRYL 3-0 CR/SH

## (undated) DEVICE — GLOVE PROTEXIS HYDROGEL SZ8.5

## (undated) DEVICE — ELECTRODE BLADE INSULATED 1 IN

## (undated) DEVICE — ELECTRODE PATIENT RETURN DISP

## (undated) DEVICE — SPONGE LAP 18X18 PREWASHED

## (undated) DEVICE — TRAP ETRAP POLYP 50 TRAY

## (undated) DEVICE — PENCIL SMOKE EVAC TELSCP 15FT

## (undated) DEVICE — SUPPORT ULNA NERVE PROTECTOR

## (undated) DEVICE — GLOVE SENSICARE PI SURG 7

## (undated) DEVICE — GLOVE PROTEXIS HYDROGEL SZ6.5

## (undated) DEVICE — TOWEL OR DISP STRL BLUE 4/PK

## (undated) DEVICE — NDL SAFETY 21G X 1 1/2 ECLPSE

## (undated) DEVICE — SUT STRATAFIX 3-0 30CM

## (undated) DEVICE — MANIFOLD 4 PORT

## (undated) DEVICE — GOWN POLY REINF X-LONG 2XL

## (undated) DEVICE — SYS CLSR DERMABOND PRINEO 22CM

## (undated) DEVICE — SPLINT FIBERGLASS PAD 3X15

## (undated) DEVICE — BLANKET WARMING LOWER BODY

## (undated) DEVICE — BANDAGE VELCLOSE ELAS 2INX5YD

## (undated) DEVICE — GLOVE SENSICARE PI GRN 7

## (undated) DEVICE — SUT ETHILON 4-0 PS4 18 BLK

## (undated) DEVICE — GOWN POLY REINF BRTH SLV XL

## (undated) DEVICE — DRAPE STERI U-SHAPED 47X51IN

## (undated) DEVICE — DRAPE HAND STERILE

## (undated) DEVICE — CLOSURE SKIN STERI STRIP 1/2X4

## (undated) DEVICE — KIT SURGICAL TURNOVER

## (undated) DEVICE — NDL SYR 10ML 18X1.5 LL BLUNT

## (undated) DEVICE — SUT 3-0 MONOCRYL PLUS PS-2

## (undated) DEVICE — PAD ABDOMINAL STERILE 8X10IN

## (undated) DEVICE — BLADE SURG STAINLESS STEEL #15

## (undated) DEVICE — GLOVE SIGNATURE MICRO LTX 8

## (undated) DEVICE — COUNT NDL FOAM MAGNET 40COUNT

## (undated) DEVICE — COVER PROXIMA MAYO STAND